# Patient Record
Sex: MALE | Race: WHITE | NOT HISPANIC OR LATINO | Employment: OTHER | URBAN - METROPOLITAN AREA
[De-identification: names, ages, dates, MRNs, and addresses within clinical notes are randomized per-mention and may not be internally consistent; named-entity substitution may affect disease eponyms.]

---

## 2019-03-22 ASSESSMENT — MIFFLIN-ST. JEOR: SCORE: 1529.43

## 2019-03-26 ENCOUNTER — ANESTHESIA - HEALTHEAST (OUTPATIENT)
Dept: SURGERY | Facility: HOSPITAL | Age: 69
End: 2019-03-26

## 2019-03-27 ENCOUNTER — SURGERY - HEALTHEAST (OUTPATIENT)
Dept: SURGERY | Facility: HOSPITAL | Age: 69
End: 2019-03-27

## 2019-04-01 ENCOUNTER — AMBULATORY - HEALTHEAST (OUTPATIENT)
Dept: OTHER | Facility: CLINIC | Age: 69
End: 2019-04-01

## 2019-08-05 ENCOUNTER — TRANSFERRED RECORDS (OUTPATIENT)
Dept: HEALTH INFORMATION MANAGEMENT | Facility: CLINIC | Age: 69
End: 2019-08-05

## 2019-08-23 ENCOUNTER — RECORDS - HEALTHEAST (OUTPATIENT)
Dept: ADMINISTRATIVE | Facility: OTHER | Age: 69
End: 2019-08-23

## 2019-09-26 ASSESSMENT — MIFFLIN-ST. JEOR: SCORE: 1642.82

## 2019-10-20 ENCOUNTER — ANESTHESIA - HEALTHEAST (OUTPATIENT)
Dept: SURGERY | Facility: CLINIC | Age: 69
End: 2019-10-20

## 2019-10-21 ENCOUNTER — SURGERY - HEALTHEAST (OUTPATIENT)
Dept: SURGERY | Facility: CLINIC | Age: 69
End: 2019-10-21

## 2019-10-21 ENCOUNTER — AMBULATORY - HEALTHEAST (OUTPATIENT)
Dept: OTHER | Facility: CLINIC | Age: 69
End: 2019-10-21

## 2019-10-21 ASSESSMENT — MIFFLIN-ST. JEOR
SCORE: 1626.95
SCORE: 1626.95

## 2019-10-23 ENCOUNTER — COMMUNICATION - HEALTHEAST (OUTPATIENT)
Dept: SCHEDULING | Facility: CLINIC | Age: 69
End: 2019-10-23

## 2020-03-11 ASSESSMENT — MIFFLIN-ST. JEOR: SCORE: 1678.31

## 2020-03-15 ENCOUNTER — ANESTHESIA - HEALTHEAST (OUTPATIENT)
Dept: SURGERY | Facility: CLINIC | Age: 70
End: 2020-03-15

## 2020-03-16 ENCOUNTER — SURGERY - HEALTHEAST (OUTPATIENT)
Dept: SURGERY | Facility: CLINIC | Age: 70
End: 2020-03-16

## 2020-03-16 ASSESSMENT — MIFFLIN-ST. JEOR: SCORE: 1653.49

## 2021-05-01 ENCOUNTER — HEALTH MAINTENANCE LETTER (OUTPATIENT)
Age: 71
End: 2021-05-01

## 2021-05-27 NOTE — ANESTHESIA POSTPROCEDURE EVALUATION
Patient: Iraj De Leon  CYSTOSCOPY RIGHT RETROGRADE PYELOGRAM, RIGHT URETEROSCOPY WITH LASER  LITHOTRIPSY STONE EXTRACTION AND RIGHT STENT EXCHANGE  Anesthesia type: general    Patient location: Phase II Recovery  Last vitals:   Vitals:    03/27/19 1800   BP: 142/80   Pulse: 89   Resp: 16   Temp: 37.1  C (98.7  F)   SpO2: 93%     Post vital signs: stable  Level of consciousness: awake and responds to simple questions  Post-anesthesia pain: pain controlled  Post-anesthesia nausea and vomiting: no  Pulmonary: unassisted, return to baseline  Cardiovascular: stable and blood pressure at baseline  Hydration: adequate  Anesthetic events: no    QCDR Measures:  ASA# 11 - Leanne-op Cardiac Arrest: ASA11B - Patient did NOT experience unanticipated cardiac arrest  ASA# 12 - Leanne-op Mortality Rate: ASA12B - Patient did NOT die  ASA# 13 - PACU Re-Intubation Rate: ASA13B - Patient did NOT require a new airway mgmt  ASA# 10 - Composite Anes Safety: ASA10A - No serious adverse event    Additional Notes:

## 2021-05-27 NOTE — ANESTHESIA PREPROCEDURE EVALUATION
Anesthesia Evaluation      Patient summary reviewed   No history of anesthetic complications     Airway   Mallampati: I  Neck ROM: full   Pulmonary - negative ROS and normal exam                          Cardiovascular - normal exam  Exercise tolerance: > or = 4 METS  (+) hypertension, , hypercholesterolemia,      Neuro/Psych    (+) neuromuscular disease,  anxiety/panic attacks,     Endo/Other    (+) diabetes mellitus type 2 well controlled,      GI/Hepatic/Renal - negative ROS      Other findings: Multiple sclerosis since age 18, fairly stable. Sleep disturbance felt secondary to snoring.  Had ESWL about 3 weeks ago at [facility] but they were unable to break up the stone enough.  Diet controlled DM. 2/28/19:  Hg 16.2, K 4.3, GFR>60, A1c 6.4.      Dental - normal exam                        Anesthesia Plan  Planned anesthetic: general endotracheal    ASA 2   Induction: intravenous   Anesthetic plan and risks discussed with: patient    Post-op plan: routine recovery

## 2021-06-02 VITALS — BODY MASS INDEX: 29.35 KG/M2 | WEIGHT: 205 LBS | HEIGHT: 70 IN

## 2021-06-02 NOTE — ANESTHESIA PROCEDURE NOTES
Spinal Block    Patient location during procedure: OR  Start time: 10/21/2019 10:10 AM  End time: 10/21/2019 10:19 AM  Reason for block: primary anesthetic    Staffing:  Performing  Anesthesiologist: Aparna Ramires MD    Preanesthetic Checklist  Completed: patient identified, risks, benefits, and alternatives discussed, timeout performed, consent obtained, airway assessed, oxygen available, suction available, emergency drugs available and hand hygiene performed  Spinal Block  Patient position: sitting  Prep: ChloraPrep  Patient monitoring: heart rate, cardiac monitor, continuous pulse ox and blood pressure  Approach: midline  Location: L3-4  Injection technique: single-shot  Needle type: pencil-tip   Needle gauge: 24 G    Assessment  Sensory level: T6

## 2021-06-02 NOTE — ANESTHESIA POSTPROCEDURE EVALUATION
Patient: Iraj De Leon  RIGHT TOTAL HIP ARTHROPLASTY  Anesthesia type: spinal    Patient location: PACU  Last vitals:   Vitals Value Taken Time   /60 10/21/2019  1:11 PM   Temp 36.4  C (97.5  F) 10/21/2019  1:11 PM   Pulse 93 10/21/2019  1:11 PM   Resp 16 10/21/2019  1:11 PM   SpO2 92 % 10/21/2019  1:11 PM     Post vital signs: stable  Level of consciousness: awake and responds to simple questions  Post-anesthesia pain: pain controlled  Post-anesthesia nausea and vomiting: no  Pulmonary: unassisted, return to baseline  Cardiovascular: stable and blood pressure at baseline  Hydration: adequate  Anesthetic events: no    QCDR Measures:  ASA# 11 - Leanne-op Cardiac Arrest: ASA11B - Patient did NOT experience unanticipated cardiac arrest  ASA# 12 - Leanne-op Mortality Rate: ASA12B - Patient did NOT die  ASA# 13 - PACU Re-Intubation Rate: NA - No ETT / LMA used for case  ASA# 10 - Composite Anes Safety: ASA10A - No serious adverse event    Additional Notes:

## 2021-06-02 NOTE — ANESTHESIA CARE TRANSFER NOTE
Last vitals:   Vitals:    10/21/19 1200   BP: 105/58   Pulse: 94   Resp: 14   Temp: 36.7  C (98.1  F)   SpO2: 100%     Patient's level of consciousness is awake  Spontaneous respirations: yes  Maintains airway independently: yes  Dentition unchanged: yes  Oropharynx: oropharynx clear of all foreign objects    QCDR Measures:  ASA# 20 - Surgical Safety Checklist: WHO surgical safety checklist completed prior to induction    PQRS# 430 - Adult PONV Prevention: 4558F - Pt received => 2 anti-emetic agents (different classes) preop & intraop  ASA# 8 - Peds PONV Prevention: NA - Not pediatric patient, not GA or 2 or more risk factors NOT present  PQRS# 424 - Leanne-op Temp Management: 4559F - At least one body temp DOCUMENTED => 35.5C or 95.9F within required timeframe  PQRS# 426 - PACU Transfer Protocol: - Transfer of care checklist used  ASA# 14 - Acute Post-op Pain: ASA14B - Patient did NOT experience pain >= 7 out of 10

## 2021-06-02 NOTE — TELEPHONE ENCOUNTER
Wife calling.  Discharged from hip replacement surgery yesterday.  Running fever 101.1.  Was given tylenol.    Dr. Gomez did surgery at [facility].  She will call their main number to see if she can page the on call provider, or they have a urgent care that opens at 8am.    I do think he needs to be seen today.  He should not be running a fever within 24 hours after surgery.    Camila Pang, RN, [facility] Nurse Triage/Med Refills RN     Reason for Disposition    Fever > 100.5 F (38.1 C)    Protocols used: POST-OP SYMPTOMS AND ANKKOESKK-A-UK

## 2021-06-02 NOTE — ANESTHESIA PREPROCEDURE EVALUATION
Anesthesia Evaluation      Patient summary reviewed     Airway   Mallampati: II  Neck ROM: full   Pulmonary - negative ROS and normal exam    breath sounds clear to auscultation                         Cardiovascular   (+) hypertension, ,     Rhythm: regular  Rate: normal,         Neuro/Psych - negative ROS   (+) anxiety/panic attacks,     Endo/Other    (+) diabetes mellitus type 2 well controlled, arthritis,      GI/Hepatic/Renal    (+)   chronic renal disease,           Dental - normal exam                        Anesthesia Plan  Planned anesthetic: spinal    ASA 3   Induction: intravenous   Anesthetic plan and risks discussed with: patient and spouse    Post-op plan: routine recovery

## 2021-06-03 VITALS — HEIGHT: 70 IN | WEIGHT: 191.5 LBS | BODY MASS INDEX: 27.41 KG/M2

## 2021-06-04 VITALS — HEIGHT: 70 IN | BODY MASS INDEX: 28 KG/M2 | WEIGHT: 195.6 LBS

## 2021-06-06 NOTE — ANESTHESIA POSTPROCEDURE EVALUATION
Patient: Iraj De Leon  Procedure(s):  LEFT TOTAL HIP ARTHROPLASTY (Left)  Anesthesia type: general    Patient location: PACU  Last vitals:   Vitals Value Taken Time   /60 3/16/2020 10:30 AM   Temp 37.3  C (99.2  F) 3/16/2020 10:31 AM   Pulse 92 3/16/2020 10:39 AM   Resp 24 3/16/2020 10:39 AM   SpO2 97 % 3/16/2020 10:39 AM   Vitals shown include unvalidated device data.  Post vital signs: stable  Level of consciousness: awake and return to baseline  Post-anesthesia pain: pain controlled  Post-anesthesia nausea and vomiting: no  Pulmonary: unassisted, nasal cannula  Cardiovascular: stable and blood pressure at baseline  Hydration: adequate  Anesthetic events: no    QCDR Measures:  ASA# 11 - Leanne-op Cardiac Arrest: ASA11B - Patient did NOT experience unanticipated cardiac arrest  ASA# 12 - Leanne-op Mortality Rate: ASA12B - Patient did NOT die  ASA# 13 - PACU Re-Intubation Rate: ASA13B - Patient did NOT require a new airway mgmt  ASA# 10 - Composite Anes Safety: ASA10A - No serious adverse event       Additional Notes:

## 2021-06-06 NOTE — ANESTHESIA PREPROCEDURE EVALUATION
Anesthesia Evaluation      Patient summary reviewed   No history of anesthetic complications     Airway   Mallampati: I  Neck ROM: full   Pulmonary - negative ROS and normal exam    breath sounds clear to auscultation                         Cardiovascular - normal exam  (+) hypertension, ,     ECG reviewed  Rhythm: regular  Rate: normal,         Neuro/Psych - negative ROS     Endo/Other    (+) diabetes mellitus type 2 well controlled, arthritis,      GI/Hepatic/Renal    (-) renal disease          Dental - normal exam                        Anesthesia Plan  Planned anesthetic: general endotracheal    ASA 2   Induction: intravenous   Anesthetic plan and risks discussed with: patient  Anesthesia plan special considerations: antiemetics,   Post-op plan: routine recovery

## 2021-06-06 NOTE — ANESTHESIA CARE TRANSFER NOTE
Last vitals:   Vitals:    03/16/20 0954   BP: 147/73   Pulse: 77   Resp: 16   Temp: 37.1  C (98.7  F)   SpO2: 100%     Patient's level of consciousness is awake  Spontaneous respirations: yes  Maintains airway independently: yes  Dentition unchanged: yes  Oropharynx: oropharynx clear of all foreign objects    QCDR Measures:  ASA# 20 - Surgical Safety Checklist: WHO surgical safety checklist completed prior to induction    PQRS# 430 - Adult PONV Prevention: 4558F - Pt received => 2 anti-emetic agents (different classes) preop & intraop  ASA# 8 - Peds PONV Prevention: NA - Not pediatric patient, not GA or 2 or more risk factors NOT present  PQRS# 424 - Leanne-op Temp Management: 4559F - At least one body temp DOCUMENTED => 35.5C or 95.9F within required timeframe  PQRS# 426 - PACU Transfer Protocol: - Transfer of care checklist used  ASA# 14 - Acute Post-op Pain: ASA14B - Patient did NOT experience pain >= 7 out of 10

## 2021-06-16 PROBLEM — M54.50 ACUTE RIGHT-SIDED LOW BACK PAIN WITHOUT SCIATICA: Status: ACTIVE | Noted: 2019-01-08

## 2021-06-16 PROBLEM — G93.89 ENCEPHALOMALACIA ON IMAGING STUDY: Status: ACTIVE | Noted: 2019-06-04

## 2021-06-16 PROBLEM — T50.2X5A DIURETIC-INDUCED HYPOKALEMIA: Status: ACTIVE | Noted: 2019-09-24

## 2021-06-16 PROBLEM — B35.1 ONYCHOMYCOSIS: Status: ACTIVE | Noted: 2017-05-11

## 2021-06-16 PROBLEM — E87.6 DIURETIC-INDUCED HYPOKALEMIA: Status: ACTIVE | Noted: 2019-09-24

## 2021-06-16 PROBLEM — M25.512 CHRONIC LEFT SHOULDER PAIN: Status: ACTIVE | Noted: 2019-01-08

## 2021-06-16 PROBLEM — M16.9 DEGENERATIVE JOINT DISEASE (DJD) OF HIP: Status: ACTIVE | Noted: 2019-10-21

## 2021-06-16 PROBLEM — E78.2 MIXED HYPERLIPIDEMIA: Status: ACTIVE | Noted: 2019-11-18

## 2021-06-16 PROBLEM — G89.29 CHRONIC LEFT SHOULDER PAIN: Status: ACTIVE | Noted: 2019-01-08

## 2021-06-16 PROBLEM — L82.1 OTHER SEBORRHEIC KERATOSIS: Status: ACTIVE | Noted: 2019-11-18

## 2021-06-26 ENCOUNTER — HEALTH MAINTENANCE LETTER (OUTPATIENT)
Age: 71
End: 2021-06-26

## 2021-10-16 ENCOUNTER — HEALTH MAINTENANCE LETTER (OUTPATIENT)
Age: 71
End: 2021-10-16

## 2021-12-13 ENCOUNTER — HOSPITAL ENCOUNTER (EMERGENCY)
Facility: CLINIC | Age: 71
Discharge: HOME | End: 2021-12-13
Admitting: EMERGENCY MEDICINE
Payer: MEDICARE

## 2021-12-13 VITALS
BODY MASS INDEX: 27.35 KG/M2 | OXYGEN SATURATION: 95 % | TEMPERATURE: 98.8 F | RESPIRATION RATE: 18 BRPM | WEIGHT: 191 LBS | HEART RATE: 67 BPM | DIASTOLIC BLOOD PRESSURE: 65 MMHG | HEIGHT: 70 IN | SYSTOLIC BLOOD PRESSURE: 110 MMHG

## 2021-12-13 DIAGNOSIS — N20.1 URETEROLITHIASIS: ICD-10-CM

## 2021-12-13 LAB
ANION GAP SERPL CALCULATED.3IONS-SCNC: 11 MMOL/L (ref 5–18)
BUN SERPL-MCNC: 23 MG/DL (ref 8–28)
C REACTIVE PROTEIN LHE: 5.2 MG/DL (ref 0–0.8)
CALCIUM SERPL-MCNC: 9.8 MG/DL (ref 8.5–10.5)
CHLORIDE BLD-SCNC: 102 MMOL/L (ref 98–107)
CO2 SERPL-SCNC: 30 MMOL/L (ref 22–31)
CREAT SERPL-MCNC: 1.3 MG/DL (ref 0.7–1.3)
ERYTHROCYTE [DISTWIDTH] IN BLOOD BY AUTOMATED COUNT: 12.8 % (ref 10–15)
GFR SERPL CREATININE-BSD FRML MDRD: 55 ML/MIN/1.73M2
GLUCOSE BLD-MCNC: 128 MG/DL (ref 70–125)
HCT VFR BLD AUTO: 49.4 % (ref 40–53)
HGB BLD-MCNC: 16.4 G/DL (ref 13.3–17.7)
MCH RBC QN AUTO: 29.5 PG (ref 26.5–33)
MCHC RBC AUTO-ENTMCNC: 33.2 G/DL (ref 31.5–36.5)
MCV RBC AUTO: 89 FL (ref 78–100)
PLATELET # BLD AUTO: 249 10E3/UL (ref 150–450)
POTASSIUM BLD-SCNC: 3.6 MMOL/L (ref 3.5–5)
RBC # BLD AUTO: 5.55 10E6/UL (ref 4.4–5.9)
SARS-COV-2 RNA RESP QL NAA+PROBE: NEGATIVE
SODIUM SERPL-SCNC: 143 MMOL/L (ref 136–145)
WBC # BLD AUTO: 9.9 10E3/UL (ref 4–11)

## 2021-12-13 PROCEDURE — 36415 COLL VENOUS BLD VENIPUNCTURE: CPT | Performed by: PHYSICIAN ASSISTANT

## 2021-12-13 PROCEDURE — 87635 SARS-COV-2 COVID-19 AMP PRB: CPT | Performed by: PHYSICIAN ASSISTANT

## 2021-12-13 PROCEDURE — 85027 COMPLETE CBC AUTOMATED: CPT | Performed by: PHYSICIAN ASSISTANT

## 2021-12-13 PROCEDURE — 96374 THER/PROPH/DIAG INJ IV PUSH: CPT

## 2021-12-13 PROCEDURE — 250N000011 HC RX IP 250 OP 636: Performed by: PHYSICIAN ASSISTANT

## 2021-12-13 PROCEDURE — 86141 C-REACTIVE PROTEIN HS: CPT | Performed by: PHYSICIAN ASSISTANT

## 2021-12-13 PROCEDURE — 99285 EMERGENCY DEPT VISIT HI MDM: CPT | Mod: 25

## 2021-12-13 PROCEDURE — C9803 HOPD COVID-19 SPEC COLLECT: HCPCS

## 2021-12-13 PROCEDURE — 96375 TX/PRO/DX INJ NEW DRUG ADDON: CPT

## 2021-12-13 PROCEDURE — 80048 BASIC METABOLIC PNL TOTAL CA: CPT | Performed by: PHYSICIAN ASSISTANT

## 2021-12-13 RX ORDER — ONDANSETRON 4 MG/1
4 TABLET, ORALLY DISINTEGRATING ORAL EVERY 8 HOURS PRN
Qty: 12 TABLET | Refills: 0 | Status: SHIPPED | OUTPATIENT
Start: 2021-12-13 | End: 2024-02-04

## 2021-12-13 RX ORDER — OXYCODONE HYDROCHLORIDE 5 MG/1
5 TABLET ORAL EVERY 6 HOURS PRN
Qty: 8 TABLET | Refills: 0 | Status: SHIPPED | OUTPATIENT
Start: 2021-12-13 | End: 2023-11-27

## 2021-12-13 RX ORDER — HYDROMORPHONE HYDROCHLORIDE 1 MG/ML
0.5 INJECTION, SOLUTION INTRAMUSCULAR; INTRAVENOUS; SUBCUTANEOUS ONCE
Status: COMPLETED | OUTPATIENT
Start: 2021-12-13 | End: 2021-12-13

## 2021-12-13 RX ORDER — ACETAMINOPHEN 325 MG/1
975 TABLET ORAL ONCE
Status: DISCONTINUED | OUTPATIENT
Start: 2021-12-13 | End: 2021-12-13

## 2021-12-13 RX ORDER — OXYCODONE HYDROCHLORIDE 5 MG/1
5 TABLET ORAL ONCE
Status: DISCONTINUED | OUTPATIENT
Start: 2021-12-13 | End: 2021-12-13

## 2021-12-13 RX ORDER — KETOROLAC TROMETHAMINE 30 MG/ML
15 INJECTION, SOLUTION INTRAMUSCULAR; INTRAVENOUS ONCE
Status: COMPLETED | OUTPATIENT
Start: 2021-12-13 | End: 2021-12-13

## 2021-12-13 RX ORDER — ONDANSETRON 2 MG/ML
4 INJECTION INTRAMUSCULAR; INTRAVENOUS ONCE
Status: COMPLETED | OUTPATIENT
Start: 2021-12-13 | End: 2021-12-13

## 2021-12-13 RX ADMIN — KETOROLAC TROMETHAMINE 15 MG: 30 INJECTION, SOLUTION INTRAMUSCULAR at 13:28

## 2021-12-13 RX ADMIN — HYDROMORPHONE HYDROCHLORIDE 0.5 MG: 1 INJECTION, SOLUTION INTRAMUSCULAR; INTRAVENOUS; SUBCUTANEOUS at 13:31

## 2021-12-13 RX ADMIN — ONDANSETRON 4 MG: 2 INJECTION INTRAMUSCULAR; INTRAVENOUS at 13:30

## 2021-12-13 ASSESSMENT — ENCOUNTER SYMPTOMS
VOMITING: 0
FLANK PAIN: 1
FEVER: 0
ABDOMINAL PAIN: 1
DYSURIA: 0
CHILLS: 0
FREQUENCY: 0
DIFFICULTY URINATING: 0
HEMATURIA: 0
SHORTNESS OF BREATH: 0
NAUSEA: 0

## 2021-12-13 ASSESSMENT — MIFFLIN-ST. JEOR: SCORE: 1627.62

## 2021-12-13 NOTE — CONSULTS
[facility] CONSULT      Type of Consult: emergency room   Place of Service:  [facility]   Reason for Consultation: flank pain / 8x7 mm obstructing stone with associated hydronephrosis  Consult Requested by: Kandi Nance PA-C    History of present illness:  Iraj De Leon is a 71 year old male history of HTN, elevated cholesterol and kidney stones that was referred to the ED from urgent care Urology was consulted for a 8x7  mm  obstructing stone with associated hydronephrosis. History obtained through patient,  and chart review.     Patient locates pain to right flank. Patient states radiation of the pain from upper right flank to lower. Patient reports the pain is currently 3/10 after pain medications, at worst 8/10. Patient states that the symptoms have been going on for 4 days. Patient states initially he thought it was musculoskeletal however pain became colicky in nature and sever over the past 2 days consistent with kidney stones he has had in the past . Patient denies: dysuria, fevers, body ache or vomiting. He has has previous stones in the past stating most recent being march 2019 requiring lithotripsy and stent placement by Dr. Pires through [facility].  He does state a history of calcium stones.     Given patein he did seek care at urgent care. Patients urine analysis through Urgent care is noninfectious, no nitrite, leucocyte, bacteria or abnormal rbc.CT obtained at urgent care to an 8 x 7 mm stone in the proximal right ureter just below the right uteropelvic junction with moderate obstruction above the level as well as a 5 mm stone in the lower pole of the left kidney.  Given size of patient's stone and discomfort urgent care did refer patient to [facility] emergency department.      While in [facility] emergency department, ED laboratory evaluation was with mild elevation of CRP 5.2, creatinine 1.3 (baseline 0.7-1.2), wbc 9.9.  Patient does states marked improvement of pain with the medications  provided in the ED consisted of IV Dilaudid and Toradol.  He is hopeful for definitive stone management.      Past medical history:  Past Medical History:   Diagnosis Date     Arthritis      Cancer (H) 02/2020    basal cell Moh's forehead     Diabetes mellitus (H)     type 2, diet controlled as of March 2019     Headache 2014    Due to spontaneous spinal fluid leak     Hyperlipidemia      Hypertension      Kidney stones 2019     Multiple sclerosis (H)      Osteoarthritis     bilateral hips     Pituitary microadenoma (H)      Posterior vitreous detachment        Past surgical history:  Past Surgical History:   Procedure Laterality Date     BASAL CELL CARCINOMA EXCISION  02/20/2020    Moh's procedure to forehead     C TOTAL HIP ARTHROPLASTY Right 10/21/2019    Procedure: RIGHT TOTAL HIP ARTHROPLASTY;  Surgeon: Conrado Gomez MD;  Location: [facility];  Service: Orthopedics     C TOTAL HIP ARTHROPLASTY Left 3/16/2020    Procedure: LEFT TOTAL HIP ARTHROPLASTY;  Surgeon: Conrado Gomez MD;  Location: [facility];  Service: Orthopedics     CHOLECYSTECTOMY       COLONOSCOPY       EYE SURGERY Right     cataract     JOINT REPLACEMENT  10/2019    RTHA     LITHOTRIPSY  2019     OTHER SURGICAL HISTORY  2009    lipoma removalforehead     OTHER SURGICAL HISTORY      spinal fluid leak     ND CYSTO/URETERO W/LITHOTRIPSY &INDWELL STENT INSRT Right 3/27/2019    Procedure: CYSTOSCOPY RIGHT RETROGRADE PYELOGRAM, RIGHT URETEROSCOPY WITH LASER  LITHOTRIPSY STONE EXTRACTION AND RIGHT STENT EXCHANGE;  Surgeon: Zia Coyle MD;  Location: [facility];  Service: Urology       Social history:  Social History     Socioeconomic History     Marital status:      Spouse name: Not on file     Number of children: Not on file     Years of education: Not on file     Highest education level: Not on file   Occupational History     Not on file   Tobacco Use     Smoking status: Former Smoker     Packs/day: 0.00     " Years: 30.00     Pack years: 0.00     Quit date: 2012     Years since quittin.9     Smokeless tobacco: Never Used     Tobacco comment: cigars, very occasional, maybe 1 x week   Substance and Sexual Activity     Alcohol use: Yes     Comment: Alcoholic Drinks/day: 2 drinks per month     Drug use: No     Sexual activity: Not on file   Other Topics Concern     Not on file   Social History Narrative     Not on file     Social Determinants of Health     Financial Resource Strain: Not on file   Food Insecurity: Not on file   Transportation Needs: Not on file   Physical Activity: Not on file   Stress: Not on file   Social Connections: Not on file   Intimate Partner Violence: Not on file   Housing Stability: Not on file       Medications:  No current facility-administered medications for this encounter.     Current Outpatient Medications   Medication     ondansetron (ZOFRAN-ODT) 4 MG ODT tab     oxyCODONE (ROXICODONE) 5 MG tablet     acetaminophen (TYLENOL) 500 MG tablet     aspirin 325 MG tablet     chlorthalidone (HYGROTEN) 25 MG tablet     cholecalciferol, vitamin D3, 1,000 unit (25 mcg) tablet     lisinopril (PRINIVIL,ZESTRIL) 20 MG tablet     multivitamin therapeutic tablet     oxyCODONE (ROXICODONE) 5 MG immediate release tablet     potassium chloride (K-DUR,KLOR-CON) 20 MEQ tablet     rosuvastatin (CRESTOR) 10 MG tablet     traMADoL (ULTRAM) 50 mg tablet       Allergies:  No Known Allergies    Review of systems:  A full 12 point review of systems was taken and is negative aside from what is noted above in the HPI.    PHYSICAL EXAM  /65   Pulse 67   Temp 98.8  F (37.1  C) (Oral)   Resp 18   Ht 1.778 m (5' 10\")   Wt 86.6 kg (191 lb)   SpO2 95%   BMI 27.41 kg/m     General: NAD, alert, cooperative  Head: normocephalic, without abnormality / atraumatic  Abdomen: soft, non tender, non distended. nosuprapubic fullness/tenderness.  Mild right CVA tenderness noted  Skin: no rashes or " lesions  Musculoskeletal: moves all four extremities equally; no calf edema or tenderness  Psychological: alert and oriented, answers questions appropriately    Labs:     Lab Results   Component Value Date     12/13/2021     11/18/2019    CO2 30 12/13/2021    CO2 26 11/18/2019    BUN 23 12/13/2021    BUN 17 11/18/2019     Lab Results   Component Value Date    WBC 9.9 12/13/2021    WBC 9.5 11/18/2019    HGB 16.4 12/13/2021    HGB 12.9 03/17/2020    HGB 15.7 11/18/2019    HCT 49.4 12/13/2021    HCT 46.4 11/18/2019    MCV 89 12/13/2021    MCV 88 11/18/2019     12/13/2021     11/18/2019     10/21/2019   Specimen:  Urine - Urine specimen (specimen)   Ref Range & Units Today   COLOR                     Yellow Color Yellow    CLARITY                   Clear Clarity Clear    SPECIFIC GRAVITY,URINE    1.010, 1.015, 1.020, 1.025                 >=1.030 Abnormal     PH,URINE                  6.0, 7.0, 8.0, 5.5, 6.5, 7.5, 8.5                 5.5    UROBILINOGEN,QUALITATIVE  Normal EU/dl Normal    PROTEIN, URINE Negative mg/dL Negative    GLUCOSE, URINE Negative mg/dL 250 Abnormal     KETONES,URINE             Negative mg/dL Negative    BILIRUBIN,URINE           Negative                 Negative    OCCULT BLOOD,URINE        Negative                 Negative    NITRITE                   Negative                 Negative    LEUKOCYTE ESTERASE        Negative                 Negative    Resulting Agency  [facility]   Specimen Collected: 12/13/21 10:37 AM Last Resulted: 12/13/21 10:53 AM   [facility]  Result Received: 12/13/21 11:34 AM         Imaging:  CT abdomen pelvis stone protocol through [employer] system on 12/13/2021 at 10: 53 AM  IMPRESSION:   1.  There is an 8 x 7 mm stone in the proximal right ureter. Moderate obstruction above this level.     2.  Nonobstructing stone lower pole left kidney     I have personally reviewed the  imaging reports above.     Assessment/plan: Iraj De Leon is being seen by [facility] for flank pain, 8x7 mm obstructing stone with hydronephrosis, patient does have other stones noted      - Patient is afebrile and is hemodynamically stable, patient's white blood cell count is within the expected range and UA is not suggestive of an infection,   - Given above did discuss with patient options including medical expulsive therapy, ureteral stenting, and ureteroscopy with holmium laser lithotripsy as available options for management of obstructing stone, and the risks and benefits associated with each  - Patient with a right 8 x 7 mm proximal stone, unlikely he will spontaneously pass this stone.  He is also with slight bump in creatinine, where 1.3 is normal this is higher for the patient his typical ranges 0.7-1.2. Patient would prefer surgical management given pain, and I would also favor this given the size of stone and slight elevation in his creatinine.  Patient will proceed with definitive management of his right proximal ureteral stone, he is scheduled for cystoscopy, ureteroscopy, retrograde pyelogram, possible lithotripsy and right ureteral stent placement tomorrow at 2:30 PM by Dr. Powers at [facility].  He will be n.p.o. 8 hours prior to surgery.  Patient will arrive to hospital 12 PM tomorrow for this procedure  - Old records reviewed - careverywhere, unfortunately due to technical difficulties unable to review patient's urologic chart as the emerge system is down.    The patient and I dicussed treatment options and their alternatives, including the risks and benefits of each. We discussed the importance of treatment and that left untreated stones can lead to potential renal function deterioration after 4 to 6 weeks and increased risk of infection and the complications associated with urinary infections. Patient demonstrated understanding. All questions and concerns were answered in detail.        Thank you for consulting [facility] regarding this patient's care. Please contact us with questions or concerns.     Tabitha Weber PA-C  [facility]   [phone]

## 2021-12-13 NOTE — ED TRIAGE NOTES
Patient presents to the ED with complaints of low back and right flank pain that started on Friday. HE reports he was just seen at Urgent care and sent to the ED with a kidney stone that is 8 mm x 7 mm on his right side.

## 2021-12-13 NOTE — DISCHARGE INSTRUCTIONS
Your CT scan shows right sided ureteral stone that is causing your symptoms. Given you are feeling better we are discharging you home, but you need to follow up with [facility] as instructed. Go to [facility] tomorrow at noon tomorrow. Nothing to eat or drink after midnight other than sips of water to take medications in the morning.     Use medications as recommended:  Ibuprofen: 400-600 mg every 8 hours until the stone passes  Tylenol: 650 mg every 8 hours until the stone passes  Dramamine: Take 50 mg at bedtime every night until stone passes, in addtion, take every 6 hours as needed for nausea or pain. Found over-the-counter.  Oxycodone: 5 mg every 6 hours as needed for severe pain not relieved by ibuprofen, tylenol and dramamine.  Zofran: 4 mg every 8 hours as needed for severe nausea.    Strain your urine. If you collect the stone it can be analyzed.  If you have fevers, worsening pain, persistent vomiting, or other worsening symptoms, return to the ER.

## 2021-12-13 NOTE — ED PROVIDER NOTES
EMERGENCY DEPARTMENT ENCOUNTER      NAME: Iraj De Leon  AGE: 71 year old male  YOB: 1950  MRN: [id number]  EVALUATION DATE & TIME: No admission date for patient encounter.    PCP: Chang Rojas    ED PROVIDER: Kandi Nance PA-C      Chief Complaint   Patient presents with     Flank Pain         FINAL IMPRESSION:  1. Ureterolithiasis          MEDICAL DECISION MAKING:    Pertinent Labs & Imaging studies reviewed. (See chart for details)  71 year old male with a pertinent history of type 2 diabetes mellitus, HTN, hyperlipidemia, calcium oxalate monohydrate kidney stones, MS, and history of tobacco use presents to the Emergency Department for evaluation of right flank pain radiating to groin x 3 days.     Vitals reviewed and notable for elevated blood pressure on arrival, likely secondary to pain as this improved with pain management. Patient is afebrile and nontoxic appearing. On physical exam, no reproducible abdominal tenderness. Mild right CVA tenderness. No overlying skin changes. Differential diagnosis includes but not limited to intestinal ischemia, colitis/diverticulitis, appendicitis, pyelonephritis, ureterolithiasis/renal colic, bowel obstruction.    Had CT this morning at [facility] reading 8 x 7 mm stone in the proximal right ureter. Moderate obstruction above this level. UA without evidence of infection. No leukocytosis. CRP 5.2. Cr 1.30, baseline 1.0. Spoke with Bridgett Weber with [facility]. She came to department to meet with patient. Plan to have outpatient procedure with stent placement tomorrow at [facility] at 2:30 PM. Patient is afebrile, without any signs of kidney failure, or urinary tract infection.  Feeling much better after medications here. Is stable for discharge home. NPO at midnight. Discussed signs and symptoms that should prompt return to the ER and patient is comfortable with this plan.     0 minutes of critical care time     ED COURSE:  12:21 PM I met with the patient, obtained  history, performed an initial exam, and discussed options and plan for diagnostics and treatment here in the ED.  1:12 PM Spoke with Bridgett Weber PA-C with [facility]. Plan for labs, pain management and she will be here to evaluate patient.  2:52 PM Bridgett Weber PA-C here to see patient. Plan for discharge and will have procedure tomorrow at 2:30 pm at [facility].   3:20 PM Patient discharged after being provided with extensive anticipatory guidance and given return precautions, importance of PCP follow-up emphasized.    At the conclusion of the encounter I discussed the results of all of the tests and the disposition. The questions were answered. The patient and family acknowledged understanding and were agreeable with the care plan.     MEDICATIONS GIVEN IN THE EMERGENCY:  Medications   ondansetron (ZOFRAN) injection 4 mg (4 mg Intravenous Given 12/13/21 1330)   ketorolac (TORADOL) injection 15 mg (15 mg Intravenous Given 12/13/21 1328)   HYDROmorphone (PF) (DILAUDID) injection 0.5 mg (0.5 mg Intravenous Given 12/13/21 1331)       NEW PRESCRIPTIONS STARTED AT TODAY'S ER VISIT  New Prescriptions    ONDANSETRON (ZOFRAN-ODT) 4 MG ODT TAB    Take 1 tablet (4 mg) by mouth every 8 hours as needed for nausea    OXYCODONE (ROXICODONE) 5 MG TABLET    Take 1 tablet (5 mg) by mouth every 6 hours as needed for severe pain If pain is not improved with acetaminophen and ibuprofen.       =================================================================    HPI:    Patient information was obtained from: Patient    Use of Interpretor: N/A         Iraj De Leon is a 71 year old male with a pertinent history of type 2 diabetes mellitus, HTN, hyperlipidemia, calcium oxalate monohydrate kidney stones, MS, and history of tobacco use who presents to this ED via walk in for evaluation of flank pain.     Patient reports right flank pain onset 12/11 (3 days ago). Pain constant, keeping him up at night. Last night pain began radiating  into his right groin. He denies any nausea, vomiting, fevers, chills, dysuria, hematuria. Reports long standing history of kidney stones. Sees Dr. Coyle with [facility]. Patient was seen at the [facility] prior to arrival today. CT abdomen pelvis found a 8 x 7 mm stone in the proximal right ureter just below the right ureteropelvic junction. Moderate obstruction above this level. There is also a 5 mm stone lower pole left kidney. Patient was referred here for further evaluation. He has not taken any pain medication today.    REVIEW OF SYSTEMS:  Review of Systems   Constitutional: Negative for chills and fever.   Respiratory: Negative for shortness of breath.    Cardiovascular: Negative for chest pain.   Gastrointestinal: Positive for abdominal pain (RLQ). Negative for nausea and vomiting.   Genitourinary: Positive for flank pain (right sided). Negative for difficulty urinating, dysuria, frequency and hematuria.   Skin: Negative for rash.   All other systems reviewed and are negative.      PAST MEDICAL HISTORY:  Past Medical History:   Diagnosis Date     Arthritis      Cancer (H) 02/2020    basal cell Moh's forehead     Diabetes mellitus (H)     type 2, diet controlled as of March 2019     Headache 2014    Due to spontaneous spinal fluid leak     Hyperlipidemia      Hypertension      Kidney stones 2019     Multiple sclerosis (H)      Osteoarthritis     bilateral hips     Pituitary microadenoma (H)      Posterior vitreous detachment        PAST SURGICAL HISTORY:  Past Surgical History:   Procedure Laterality Date     BASAL CELL CARCINOMA EXCISION  02/20/2020    Moh's procedure to forehead     C TOTAL HIP ARTHROPLASTY Right 10/21/2019    Procedure: RIGHT TOTAL HIP ARTHROPLASTY;  Surgeon: Conrado Gomez MD;  Location: [facility];  Service: Orthopedics     C TOTAL HIP ARTHROPLASTY Left 3/16/2020    Procedure: LEFT TOTAL HIP ARTHROPLASTY;  Surgeon: Conrado Gomez MD;  Location: [facility];   Service: Orthopedics     CHOLECYSTECTOMY       COLONOSCOPY       EYE SURGERY Right     cataract     JOINT REPLACEMENT  10/2019    RTHA     LITHOTRIPSY  2019     OTHER SURGICAL HISTORY  2009    lipoma removalforehead     OTHER SURGICAL HISTORY      spinal fluid leak     [facility] CYSTO/URETERO W/LITHOTRIPSY &INDWELL STENT INSRT Right 3/27/2019    Procedure: CYSTOSCOPY RIGHT RETROGRADE PYELOGRAM, RIGHT URETEROSCOPY WITH LASER  LITHOTRIPSY STONE EXTRACTION AND RIGHT STENT EXCHANGE;  Surgeon: Zia Coyle MD;  Location: [facility];  Service: Urology           CURRENT MEDICATIONS:    No current facility-administered medications for this encounter.    Current Outpatient Medications:      ondansetron (ZOFRAN-ODT) 4 MG ODT tab, Take 1 tablet (4 mg) by mouth every 8 hours as needed for nausea, Disp: 12 tablet, Rfl: 0     oxyCODONE (ROXICODONE) 5 MG tablet, Take 1 tablet (5 mg) by mouth every 6 hours as needed for severe pain If pain is not improved with acetaminophen and ibuprofen., Disp: 8 tablet, Rfl: 0     acetaminophen (TYLENOL) 500 MG tablet, [ACETAMINOPHEN (TYLENOL) 500 MG TABLET] Take 2 tablets (1,000 mg total) by mouth 3 (three) times a day., Disp: , Rfl: 0     aspirin 325 MG tablet, [ASPIRIN 325 MG TABLET] Take 1 tablet (325 mg total) by mouth 2 (two) times a day with meals., Disp: , Rfl: 0     chlorthalidone (HYGROTEN) 25 MG tablet, [CHLORTHALIDONE (HYGROTEN) 25 MG TABLET] Take 25 mg by mouth daily., Disp: , Rfl:      cholecalciferol, vitamin D3, 1,000 unit (25 mcg) tablet, [CHOLECALCIFEROL, VITAMIN D3, 1,000 UNIT (25 MCG) TABLET] Take 1,000 Units by mouth daily., Disp: , Rfl:      lisinopril (PRINIVIL,ZESTRIL) 20 MG tablet, [LISINOPRIL (PRINIVIL,ZESTRIL) 20 MG TABLET] Take 20 mg by mouth daily., Disp: , Rfl:      multivitamin therapeutic tablet, [MULTIVITAMIN THERAPEUTIC TABLET] Take 1 tablet by mouth daily., Disp: , Rfl:      oxyCODONE (ROXICODONE) 5 MG immediate release tablet, [OXYCODONE (ROXICODONE) 5  MG IMMEDIATE RELEASE TABLET] Take 1 tablet (5 mg total) by mouth every 4 (four) hours as needed for pain (Take for breakthrough pain not controlled by Tylenol and Tramadol)., Disp: 10 tablet, Rfl: 0     potassium chloride (K-DUR,KLOR-CON) 20 MEQ tablet, [POTASSIUM CHLORIDE (K-DUR,KLOR-CON) 20 MEQ TABLET] Take 20 mEq by mouth 2 (two) times a day. , Disp: , Rfl:      rosuvastatin (CRESTOR) 10 MG tablet, [ROSUVASTATIN (CRESTOR) 10 MG TABLET] Take 10 mg by mouth at bedtime., Disp: , Rfl:      traMADoL (ULTRAM) 50 mg tablet, [TRAMADOL (ULTRAM) 50 MG TABLET] Take 1 tablet (50 mg total) by mouth every 6 (six) hours as needed for pain., Disp: 30 tablet, Rfl: 0      ALLERGIES:  No Known Allergies    FAMILY HISTORY:  No family history on file.    SOCIAL HISTORY:   Social History     Socioeconomic History     Marital status:      Spouse name: Not on file     Number of children: Not on file     Years of education: Not on file     Highest education level: Not on file   Occupational History     Not on file   Tobacco Use     Smoking status: Former Smoker     Packs/day: 0.00     Years: 30.00     Pack years: 0.00     Quit date: 2012     Years since quittin.9     Smokeless tobacco: Never Used     Tobacco comment: cigars, very occasional, maybe 1 x week   Substance and Sexual Activity     Alcohol use: Yes     Comment: Alcoholic Drinks/day: 2 drinks per month     Drug use: No     Sexual activity: Not on file   Other Topics Concern     Not on file   Social History Narrative     Not on file     Social Determinants of Health     Financial Resource Strain: Not on file   Food Insecurity: Not on file   Transportation Needs: Not on file   Physical Activity: Not on file   Stress: Not on file   Social Connections: Not on file   Intimate Partner Violence: Not on file   Housing Stability: Not on file       VITALS:  Patient Vitals for the past 24 hrs:   BP Temp Temp src Pulse Resp SpO2 Height Weight   21 1400 119/67 -- -- 75  "-- 95 % -- --   12/13/21 1345 117/69 -- -- 84 -- 91 % -- --   12/13/21 1148 (!) 135/98 98.8  F (37.1  C) Oral 79 18 96 % 1.778 m (5' 10\") 86.6 kg (191 lb)       PHYSICAL EXAM  Constitutional: Well developed, Well nourished, NAD  HENT: Normocephalic, Atraumatic, Bilateral external ears normal, wearing mask in light of COVID-19 pandemic   Neck: Normal range of motion, No tenderness, Supple, No stridor.  Eyes: Conjunctiva normal, No discharge.   Respiratory: Normal breath sounds, No respiratory distress, No wheezing, Speaks full sentences easily. No cough.  Cardiovascular: Normal heart rate, Regular rhythm, No murmurs, No rubs, No gallops. Chest wall nontender.  GI: Soft, No tenderness, No masses, right CVA tenderness. No rebound or guarding.  Musculoskeletal: No edema. No cyanosis, No clubbing. Good range of motion in all major joints. No tenderness to palpation or major deformities noted. No tenderness of the CTLS spine.   Integument: Warm, Dry, No erythema, No rash. No petechiae.  Neurologic: Alert & oriented x 3, Normal motor function, Normal sensory function, No focal deficits noted. Normal gait.  Psychiatric: Affect normal, Judgment normal, Mood normal. Cooperative.    LAB:  All pertinent labs reviewed and interpreted.  Recent Results (from the past 24 hour(s))   Basic metabolic panel    Collection Time: 12/13/21  1:28 PM   Result Value Ref Range    Sodium 143 136 - 145 mmol/L    Potassium 3.6 3.5 - 5.0 mmol/L    Chloride 102 98 - 107 mmol/L    Carbon Dioxide (CO2) 30 22 - 31 mmol/L    Anion Gap 11 5 - 18 mmol/L    Urea Nitrogen 23 8 - 28 mg/dL    Creatinine 1.30 0.70 - 1.30 mg/dL    Calcium 9.8 8.5 - 10.5 mg/dL    Glucose 128 (H) 70 - 125 mg/dL    GFR Estimate 55 (L) >60 mL/min/1.73m2   CRP inflammation    Collection Time: 12/13/21  1:28 PM   Result Value Ref Range    CRP 5.2 (H) 0.0-<0.8 mg/dL   CBC (+ platelets, no diff)    Collection Time: 12/13/21  1:28 PM   Result Value Ref Range    WBC Count 9.9 4.0 - 11.0 " 10e3/uL    RBC Count 5.55 4.40 - 5.90 10e6/uL    Hemoglobin 16.4 13.3 - 17.7 g/dL    Hematocrit 49.4 40.0 - 53.0 %    MCV 89 78 - 100 fL    MCH 29.5 26.5 - 33.0 pg    MCHC 33.2 31.5 - 36.5 g/dL    RDW 12.8 10.0 - 15.0 %    Platelet Count 249 150 - 450 10e3/uL   Asymptomatic COVID-19 Virus (Coronavirus) by PCR Nasopharyngeal    Collection Time: 12/13/21  2:11 PM    Specimen: Nasopharyngeal; Swab   Result Value Ref Range    SARS CoV2 PCR Negative Negative         RADIOLOGY:  Reviewed all pertinent imaging. Please see official radiology report.    EXAM: CT ABDOMEN PELVIS STONE PROTOCOL WO   LOCATION: [facility]   DATE/TIME: 12/13/2021 10:53 AM     IMPRESSION:   1.  There is an 8 x 7 mm stone in the proximal right ureter. Moderate obstruction above this level.     2.  Nonobstructing stone lower pole left kidney.        I, Olinda Moseley, am serving as a scribe to document services personally performed by Kandi Nance PA-C based on my observation and the provider's statements to me. I, Kandi Nance PA-C attest that Olinda Moseley is acting in a scribe capacity, has observed my performance of the services and has documented them in accordance with my direction.    Kandi Nance PA-C  Emergency Medicine  [facility]  12/13/2021       Kandi Nance PA-C  12/13/21 5106

## 2022-02-11 ENCOUNTER — TRANSFERRED RECORDS (OUTPATIENT)
Dept: HEALTH INFORMATION MANAGEMENT | Facility: CLINIC | Age: 72
End: 2022-02-11

## 2022-02-17 PROBLEM — N20.0 CALCIUM OXALATE MONOHYDRATE KIDNEY STONES: Status: ACTIVE | Noted: 2019-11-18

## 2022-02-17 PROBLEM — G35 MULTIPLE SCLEROSIS (H): Status: ACTIVE | Noted: 2019-11-18

## 2022-05-22 ENCOUNTER — HEALTH MAINTENANCE LETTER (OUTPATIENT)
Age: 72
End: 2022-05-22

## 2022-09-25 ENCOUNTER — HEALTH MAINTENANCE LETTER (OUTPATIENT)
Age: 72
End: 2022-09-25

## 2022-11-23 ENCOUNTER — HOSPITAL ENCOUNTER (EMERGENCY)
Facility: CLINIC | Age: 72
Discharge: HOME | End: 2022-11-23
Attending: EMERGENCY MEDICINE | Admitting: EMERGENCY MEDICINE
Payer: MEDICARE

## 2022-11-23 ENCOUNTER — APPOINTMENT (OUTPATIENT)
Dept: CT IMAGING | Facility: CLINIC | Age: 72
End: 2022-11-23
Attending: PHYSICIAN ASSISTANT
Payer: MEDICARE

## 2022-11-23 VITALS
BODY MASS INDEX: 27.2 KG/M2 | SYSTOLIC BLOOD PRESSURE: 138 MMHG | WEIGHT: 190 LBS | DIASTOLIC BLOOD PRESSURE: 82 MMHG | RESPIRATION RATE: 18 BRPM | TEMPERATURE: 99 F | HEART RATE: 93 BPM | HEIGHT: 70 IN | OXYGEN SATURATION: 95 %

## 2022-11-23 DIAGNOSIS — M62.81 GENERALIZED MUSCLE WEAKNESS: ICD-10-CM

## 2022-11-23 LAB
ALBUMIN UR-MCNC: 20 MG/DL
ANION GAP SERPL CALCULATED.3IONS-SCNC: 8 MMOL/L (ref 5–18)
APPEARANCE UR: CLEAR
ATRIAL RATE - MUSE: 104 BPM
BACTERIA #/AREA URNS HPF: ABNORMAL /HPF
BILIRUB UR QL STRIP: NEGATIVE
BUN SERPL-MCNC: 17 MG/DL (ref 8–28)
CALCIUM SERPL-MCNC: 9.4 MG/DL (ref 8.5–10.5)
CHLORIDE BLD-SCNC: 97 MMOL/L (ref 98–107)
CO2 SERPL-SCNC: 31 MMOL/L (ref 22–31)
COLOR UR AUTO: YELLOW
CREAT SERPL-MCNC: 0.89 MG/DL (ref 0.7–1.3)
DIASTOLIC BLOOD PRESSURE - MUSE: NORMAL MMHG
ERYTHROCYTE [DISTWIDTH] IN BLOOD BY AUTOMATED COUNT: 13.2 % (ref 10–15)
GFR SERPL CREATININE-BSD FRML MDRD: >90 ML/MIN/1.73M2
GLUCOSE BLD-MCNC: 147 MG/DL (ref 70–125)
GLUCOSE UR STRIP-MCNC: 50 MG/DL
HCT VFR BLD AUTO: 51.5 % (ref 40–53)
HGB BLD-MCNC: 16.6 G/DL (ref 13.3–17.7)
HGB UR QL STRIP: NEGATIVE
INTERPRETATION ECG - MUSE: NORMAL
KETONES UR STRIP-MCNC: NEGATIVE MG/DL
LEUKOCYTE ESTERASE UR QL STRIP: NEGATIVE
MAGNESIUM SERPL-MCNC: 2.2 MG/DL (ref 1.8–2.6)
MCH RBC QN AUTO: 29.1 PG (ref 26.5–33)
MCHC RBC AUTO-ENTMCNC: 32.2 G/DL (ref 31.5–36.5)
MCV RBC AUTO: 90 FL (ref 78–100)
MUCOUS THREADS #/AREA URNS LPF: PRESENT /LPF
NITRATE UR QL: NEGATIVE
P AXIS - MUSE: 77 DEGREES
PH UR STRIP: 7 [PH] (ref 5–7)
PLATELET # BLD AUTO: 216 10E3/UL (ref 150–450)
POTASSIUM BLD-SCNC: 3.8 MMOL/L (ref 3.5–5)
PR INTERVAL - MUSE: 184 MS
QRS DURATION - MUSE: 94 MS
QT - MUSE: 356 MS
QTC - MUSE: 468 MS
R AXIS - MUSE: 31 DEGREES
RBC # BLD AUTO: 5.7 10E6/UL (ref 4.4–5.9)
RBC URINE: 11 /HPF
SODIUM SERPL-SCNC: 136 MMOL/L (ref 136–145)
SP GR UR STRIP: 1.02 (ref 1–1.03)
SYSTOLIC BLOOD PRESSURE - MUSE: NORMAL MMHG
T AXIS - MUSE: 87 DEGREES
UROBILINOGEN UR STRIP-MCNC: <2 MG/DL
VENTRICULAR RATE- MUSE: 104 BPM
WBC # BLD AUTO: 5.1 10E3/UL (ref 4–11)
WBC URINE: 1 /HPF

## 2022-11-23 PROCEDURE — G1010 CDSM STANSON: HCPCS

## 2022-11-23 PROCEDURE — 85027 COMPLETE CBC AUTOMATED: CPT | Performed by: PHYSICIAN ASSISTANT

## 2022-11-23 PROCEDURE — 93005 ELECTROCARDIOGRAM TRACING: CPT | Performed by: EMERGENCY MEDICINE

## 2022-11-23 PROCEDURE — 81001 URINALYSIS AUTO W/SCOPE: CPT | Performed by: PHYSICIAN ASSISTANT

## 2022-11-23 PROCEDURE — 93005 ELECTROCARDIOGRAM TRACING: CPT | Performed by: PHYSICIAN ASSISTANT

## 2022-11-23 PROCEDURE — 83735 ASSAY OF MAGNESIUM: CPT | Performed by: PHYSICIAN ASSISTANT

## 2022-11-23 PROCEDURE — 36415 COLL VENOUS BLD VENIPUNCTURE: CPT | Performed by: PHYSICIAN ASSISTANT

## 2022-11-23 PROCEDURE — 99285 EMERGENCY DEPT VISIT HI MDM: CPT | Mod: 25

## 2022-11-23 PROCEDURE — 80048 BASIC METABOLIC PNL TOTAL CA: CPT | Performed by: PHYSICIAN ASSISTANT

## 2022-11-23 ASSESSMENT — ENCOUNTER SYMPTOMS: WEAKNESS: 1

## 2022-11-23 ASSESSMENT — ACTIVITIES OF DAILY LIVING (ADL): ADLS_ACUITY_SCORE: 35

## 2022-11-23 NOTE — DISCHARGE INSTRUCTIONS
Encourage rest and fluids.  Use walker for support always.  Call your neurologist in the next few days and set up a follow-up appointment in the next week or 2.  See your doctor, neurologist, or return if worse or problems.

## 2022-11-23 NOTE — ED TRIAGE NOTES
Patient here after having increased weakness for the past 2 days, patient had a MRI recently showing R sided brain loss and has a hx of MS.  Wife reports that patient fell x3 yesterday.  Mary Kamara RN.......11/23/2022 10:09 AM     Triage Assessment     Row Name 11/23/22 1004       Triage Assessment (Adult)    Airway WDL WDL       Respiratory WDL    Respiratory WDL WDL       Skin Circulation/Temperature WDL    Skin Circulation/Temperature WDL WDL       Cardiac WDL    Cardiac WDL WDL       Peripheral/Neurovascular WDL    Peripheral Neurovascular WDL WDL       Cognitive/Neuro/Behavioral WDL    Cognitive/Neuro/Behavioral WDL WDL

## 2022-11-23 NOTE — ED PROVIDER NOTES
EMERGENCY DEPARTMENT ENCOUNTER      NAME: Iraj De Leon  AGE: 71 year old male  YOB: 1950  MRN: [id number]  EVALUATION DATE & TIME: 2022 11:05 AM    PCP: Chang Rojas    ED PROVIDER: Tashi Marks M.D.      Chief Complaint   Patient presents with     Generalized Weakness         FINAL IMPRESSION:  1.  Acute general weakness.  2.  Recurrent falls.      ED COURSE & MEDICAL DECISION MAKIN:18 AM  I met with the patient to gather history and to perform my initial exam. We discussed plans for the ED course, including diagnostic testing and treatment. PPE worn: cloth mask.  Patient with falls x3 yesterday.  No complaints or injuries from this.  Generalized weakness for the last 2 days.  Patient having to use a walker to get around.  Reportedly had a recent MRI that showed significant right-sided atrophy.  History of underlying MS which causes minimal problems.  2:06 PM.  Head CT without acute findings.  Atrophy is present and more pronounced in the right temporal areas this could be consistent with old stroke, old injury, early dementia, etc.  CBC and chemistries unremarkable.  Urinalysis and unremarkable.  Patient is discharged home.  We will have him follow-up with his neurologist.  We did talk about encouraging fluids and using a walker for extra support.  Patient and family in agreement with the plan.    Pertinent Labs & Imaging studies reviewed. (See chart for details)      71 year old male presents to the Emergency Department for evaluation of weakness and falls.    At the conclusion of the encounter I discussed the results of all of the tests and the disposition. The questions were answered. The patient or family acknowledged understanding and was agreeable with the care plan.     Medical Decision Making    History:    Supplemental history from: N/A    External Record(s) reviewed: Inpatient Record: Inpatient computer records were reviewed.    Work Up:    Chart documentation includes  "differential considered and any EKGs or imaging interpreted by provider.  Differential diagnosis includes infection, electrolyte abnormalities, flareup of his MS, stroke, other pathology.    In additional to work up documented, I considered the following work up: See chart documentation, if applicable.    External consultation:    Discussion of management with another provider: See chart documentation, if applicable    Complicating factors:    Care impacted by chronic illness: None    Care affected by social determinants of health: N/A    Disposition considerations: Possible admission versus discharge.  Evaluation pending.        MEDICATIONS GIVEN IN THE EMERGENCY:  Medications - No data to display    NEW PRESCRIPTIONS STARTED AT TODAY'S ER VISIT  New Prescriptions    No medications on file          =================================================================    HPI    Patient information was obtained from: patient    Use of : N/A       Iraj De Leon is a 71 year old male with a pertinent history of HTN, HLD, hx of basal cell carcinoma, type II diabetes mellitus, multiple sclerosis, cerebralspinal fluid leak, abdominal aortic aneurysm, s/p total hip arthoplasty, arthritis, mild neurocognitive disorder, calcium oxalate monohydrate kidney stones, and chronic left shoulder pain who presents to this ED via walk-in for evaluation of generalized weakness.    Patient reports increased generalized weakness for the past 2 day. On Monday (11/21), he states that he was using his walker but had difficultly ambulating than at baseline. He was experiencing right sided \"head pain\" at that time. He recently had an MRI done that showed right sided brain atrophy. They did not mention what caused this. He has a history of MS but denies any recent flare up. Per wife, patient had three falls yesterday (11/22). Patient comes to the ED for worsening generalize weakness. He does not identify any waxing or waning symptoms " otherwise, exacerbating or alleviating features,associated symptoms except as mentioned. Pateint denies leg swelling and any pain related complaints.    REVIEW OF SYSTEMS   Review of Systems   Cardiovascular: Negative for leg swelling.   Neurological: Positive for weakness.        Positive for right sided head pain   All other systems reviewed and are negative.       PAST MEDICAL HISTORY:  Past Medical History:   Diagnosis Date     Arthritis      Cancer (H) 02/2020    basal cell Moh's forehead     Diabetes mellitus (H)     type 2, diet controlled as of March 2019     Headache 2014    Due to spontaneous spinal fluid leak     Hyperlipidemia      Hypertension      Kidney stones 2019     Multiple sclerosis (H)      Osteoarthritis     bilateral hips     Pituitary microadenoma (H)      Posterior vitreous detachment        PAST SURGICAL HISTORY:  Past Surgical History:   Procedure Laterality Date     BASAL CELL CARCINOMA EXCISION  02/20/2020    Moh's procedure to forehead     CHOLECYSTECTOMY       COLONOSCOPY       EYE SURGERY Right     cataract     JOINT REPLACEMENT  10/2019    RTHA     LITHOTRIPSY  2019     OTHER SURGICAL HISTORY  2009    lipoma removalforehead     OTHER SURGICAL HISTORY      spinal fluid leak     PA CYSTO/URETERO W/LITHOTRIPSY &INDWELL STENT INSRT Right 3/27/2019    Procedure: CYSTOSCOPY RIGHT RETROGRADE PYELOGRAM, RIGHT URETEROSCOPY WITH LASER  LITHOTRIPSY STONE EXTRACTION AND RIGHT STENT EXCHANGE;  Surgeon: Zia Coyle MD;  Location: [facility];  Service: Urology     [facility] TOTAL HIP ARTHROPLASTY Right 10/21/2019    Procedure: RIGHT TOTAL HIP ARTHROPLASTY;  Surgeon: Conrado Gomez MD;  Location: [facility] OR;  Service: Orthopedics     [facility] TOTAL HIP ARTHROPLASTY Left 3/16/2020    Procedure: LEFT TOTAL HIP ARTHROPLASTY;  Surgeon: Conrado Gomez MD;  Location: [facility];  Service: Orthopedics           CURRENT MEDICATIONS:    acetaminophen (TYLENOL) 500 MG  "tablet  aspirin 325 MG tablet  chlorthalidone (HYGROTEN) 25 MG tablet  cholecalciferol, vitamin D3, 1,000 unit (25 mcg) tablet  lisinopril (PRINIVIL,ZESTRIL) 20 MG tablet  multivitamin therapeutic tablet  ondansetron (ZOFRAN-ODT) 4 MG ODT tab  oxyCODONE (ROXICODONE) 5 MG immediate release tablet  oxyCODONE (ROXICODONE) 5 MG tablet  potassium chloride (K-DUR,KLOR-CON) 20 MEQ tablet  rosuvastatin (CRESTOR) 10 MG tablet  traMADoL (ULTRAM) 50 mg tablet        ALLERGIES:  No Known Allergies    FAMILY HISTORY:  No family history on file.    SOCIAL HISTORY:   Social History     Socioeconomic History     Marital status:      Spouse name: None     Number of children: None     Years of education: None     Highest education level: None   Tobacco Use     Smoking status: Former     Packs/day: 0.00     Years: 30.00     Pack years: 0.00     Types: Cigarettes     Quit date: 1/1/2012     Years since quitting: 10.9     Smokeless tobacco: Never     Tobacco comments:     cigars, very occasional, maybe 1 x week   Substance and Sexual Activity     Alcohol use: Yes     Comment: Alcoholic Drinks/day: 2 drinks per month     Drug use: No   Former smoker.  Occasional alcohol.  No drugs.    VITALS:  /58   Pulse 53   Temp 99  F (37.2  C) (Oral)   Resp 18   Ht 1.778 m (5' 10\")   Wt 86.2 kg (190 lb)   SpO2 95%   BMI 27.26 kg/m      PHYSICAL EXAM    Vital Signs:  /58   Pulse 53   Temp 99  F (37.2  C) (Oral)   Resp 18   Ht 1.778 m (5' 10\")   Wt 86.2 kg (190 lb)   SpO2 95%   BMI 27.26 kg/m    General:  On entering the room he is in no apparent distress.    Neck:  Neck supple with full range of motion and nontender.    Back:  Back and spine are nontender.  No costovertebral angle tenderness.    HEENT:  Oropharynx clear with moist mucous membranes.  HEENT unremarkable.    Pulmonary:  Chest clear to auscultation without rhonchi rales or wheezing.    Cardiovascular:  Cardiac regular rate and rhythm without murmurs rubs " or gallops.    Abdomen:  Abdomen soft nontender.  There is no rebound or guarding.    Muskuloskeletal:  he moves all 4 without any difficulty and has normal neurovascular exams.  Extremities without clubbing, cyanosis, or edema.  Legs and calves are nontender.    Neuro:  he is alert and oriented ×3 and moves all extremities symmetrically.  Strength 5/5 in all 4 extremities.  Sensation intact in all 4 extremities.  Cranial nerves II through XII intact and equal bilaterally.  Psych:  Normal affect.    Skin:  Unremarkable and warm and dry.       LAB:  All pertinent labs reviewed and interpreted.  Labs Ordered and Resulted from Time of ED Arrival to Time of ED Departure   BASIC METABOLIC PANEL - Abnormal       Result Value    Sodium 136      Potassium 3.8      Chloride 97 (*)     Carbon Dioxide (CO2) 31      Anion Gap 8      Urea Nitrogen 17      Creatinine 0.89      Calcium 9.4      Glucose 147 (*)     GFR Estimate >90     UA MACROSCOPIC WITH REFLEX TO MICRO AND CULTURE - Abnormal    Color Urine Yellow      Appearance Urine Clear      Glucose Urine 50 (*)     Bilirubin Urine Negative      Ketones Urine Negative      Specific Gravity Urine 1.022      Blood Urine Negative      pH Urine 7.0      Protein Albumin Urine 20 (*)     Urobilinogen Urine <2.0      Nitrite Urine Negative      Leukocyte Esterase Urine Negative      Bacteria Urine Few (*)     Mucus Urine Present (*)     RBC Urine 11 (*)     WBC Urine 1     CBC WITH PLATELETS - Normal    WBC Count 5.1      RBC Count 5.70      Hemoglobin 16.6      Hematocrit 51.5      MCV 90      MCH 29.1      MCHC 32.2      RDW 13.2      Platelet Count 216     MAGNESIUM - Normal    Magnesium 2.2         RADIOLOGY:  Reviewed all pertinent imaging. Please see official radiology report.  Head CT w/o contrast   Final Result   IMPRESSION:   1.  No acute intracranial abnormality.      2.  Advanced/severe volume loss in the anterior right temporal lobe with a mild to moderate volume loss in  the anterior left temporal lobe appears more conspicuous than the background mild diffuse parenchymal volume loss elsewhere. This pattern of volume    loss is nonspecific, and may relate to remote injury. Advanced temporal lobe volume loss can also be seen in the setting of Alzheimer's, and clinical correlation for dementia would be of benefit.      3.  Severe burden chronic small vessel ischemic change.                 EKG:                I, Elaine Saldivar Kirit, am serving as a scribe to document services personally performed by Dr. Marks based on my observation and the provider's statements to me. I, Tashi Marks MD attest that Elaine Beth is acting in a scribe capacity, has observed my performance of the services and has documented them in accordance with my direction.    Tashi Marks M.D.  Emergency Medicine  [facility] EMERGENCY ROOM  [address] [address]  [phone]  Dept: [phone]       Tashi Marks MD  11/23/22 3036

## 2023-01-06 ENCOUNTER — TRANSFERRED RECORDS (OUTPATIENT)
Dept: HEALTH INFORMATION MANAGEMENT | Facility: CLINIC | Age: 73
End: 2023-01-06

## 2023-02-04 ENCOUNTER — HEALTH MAINTENANCE LETTER (OUTPATIENT)
Age: 73
End: 2023-02-04

## 2023-08-06 ENCOUNTER — HEALTH MAINTENANCE LETTER (OUTPATIENT)
Age: 73
End: 2023-08-06

## 2023-08-16 ENCOUNTER — TRANSFERRED RECORDS (OUTPATIENT)
Dept: HEALTH INFORMATION MANAGEMENT | Facility: CLINIC | Age: 73
End: 2023-08-16

## 2023-08-21 ENCOUNTER — TRANSFERRED RECORDS (OUTPATIENT)
Dept: HEALTH INFORMATION MANAGEMENT | Facility: CLINIC | Age: 73
End: 2023-08-21

## 2023-09-27 ENCOUNTER — MEDICAL CORRESPONDENCE (OUTPATIENT)
Dept: HEALTH INFORMATION MANAGEMENT | Facility: CLINIC | Age: 73
End: 2023-09-27
Payer: MEDICARE

## 2023-09-28 ENCOUNTER — TRANSCRIBE ORDERS (OUTPATIENT)
Dept: OTHER | Age: 73
End: 2023-09-28

## 2023-09-28 DIAGNOSIS — F09 COGNITIVE DYSFUNCTION: Primary | ICD-10-CM

## 2023-09-28 DIAGNOSIS — R29.898 RIGHT LEG WEAKNESS: ICD-10-CM

## 2023-09-28 DIAGNOSIS — G93.89 ENCEPHALOMALACIA: ICD-10-CM

## 2023-09-28 DIAGNOSIS — G35 MS (MULTIPLE SCLEROSIS) (H): ICD-10-CM

## 2023-12-22 ENCOUNTER — NURSE TRIAGE (OUTPATIENT)
Dept: NURSING | Facility: CLINIC | Age: 73
End: 2023-12-22
Payer: MEDICARE

## 2023-12-22 NOTE — TELEPHONE ENCOUNTER
Nurse Triage SBAR    Is this a 2nd Level Triage? YES, LICENSED PRACTITIONER REVIEW IS REQUIRED    Situation: Speech more garbled     Background: Wife calling  in. She noticed his speech was more garbled last week and this morning he was more unstable while walking. She states he has MS and hip problems. He has no other complaints but she states he usually will not complain about anything.   He is not established with [facility] yet. He has a future appointment in [place].   Updated records in Care Everywhere-he has been seen at [facility] and had neuro psych testing done.   History of MS, MCI. Last visit with PCP on 10/9/23. They live in senior housing now in [place].    Assessment: Change in speech    Protocol Recommended Disposition:   Go To Office Now    Recommendation: Please call wife at [phone] or  [phone] and let her know if there are earlier appointments in [place] or   [place]. I have informed her that if there is no appts today she should bring him in for evaluation at an ED.       Routed to provider/Team    Brenda Dudley RN      Reason for Disposition   Neurologic deficit of gradual onset (e.g., days to weeks), ANY of the following: * Weakness of the face, arm, or leg on one side of the body* Numbness of the face, arm, or leg on one side of the body* Loss of speech or garbled speech    Additional Information   Negative: Difficult to awaken or acting confused (e.g., disoriented, slurred speech)   Negative: New neurologic deficit that is present NOW, sudden onset of ANY of the following: * Weakness of the face, arm, or leg on one side of the body* Numbness of the face, arm, or leg on one side of the body* Loss of speech or garbled speech   Negative: Sounds like a life-threatening emergency to the triager   Negative: SEVERE weakness (i.e., unable to walk or barely able to walk, requires support) and new-onset or worsening   Negative: Confusion, disorientation, or hallucinations is main symptom   Negative:  "Dizziness is main symptom   Negative: Followed a head injury within last 3 days   Negative: Headache (with neurologic deficit)   Negative: Unable to urinate (or only a few drops) and bladder feels very full   Negative: Loss of bladder or bowel control (urine or bowel incontinence; wetting self, leaking stool) of new-onset   Negative: Back pain with numbness (loss of sensation) in groin or rectal area   Negative: Neurologic deficit that was brief (now gone), ANY of the following: * Weakness of the face, arm, or leg on one side of the body * Numbness of the face, arm, or leg on one side of the body * Loss of speech or garbled speech   Negative: Patient sounds very sick or weak to the triager    Answer Assessment - Initial Assessment Questions  1. SYMPTOM: \"What is the main symptom you are concerned about?\" (e.g., weakness, numbness)      Garbled speech  2. ONSET: \"When did this start?\" (minutes, hours, days; while sleeping)      Last week  3. LAST NORMAL: \"When was the last time you (the patient) were normal (no symptoms)?\"      Last week  4. PATTERN \"Does this come and go, or has it been constant since it started?\"  \"Is it present now?\"      intermittent  5. CARDIAC SYMPTOMS: \"Have you had any of the following symptoms: chest pain, difficulty breathing, palpitations?\"      none  6. NEUROLOGIC SYMPTOMS: \"Have you had any of the following symptoms: headache, dizziness, vision loss, double vision, changes in speech, unsteady on your feet?\"      Unsteady today more than usual, stoic and doesn't want to do things  7. OTHER SYMPTOMS: \"Do you have any other symptoms?\"      none  8. PREGNANCY: \"Is there any chance you are pregnant?\" \"When was your last menstrual period?\"      Na  \line   Protocols used: Neurologic Deficit-A-OH    "

## 2023-12-22 NOTE — TELEPHONE ENCOUNTER
Call placed to Patient.  No answer.  Left message with call back number for Patient to return call.  Martin Munoz RN

## 2023-12-27 NOTE — TELEPHONE ENCOUNTER
Call placed to patient   No answer; voicemail left requesting call back to Clinic RN at [phone]    Tal Lai, Clinic RN  [facility]

## 2024-01-18 ENCOUNTER — DOCUMENTATION ONLY (OUTPATIENT)
Dept: OTHER | Facility: CLINIC | Age: 74
End: 2024-01-18

## 2024-01-18 ENCOUNTER — OFFICE VISIT (OUTPATIENT)
Dept: FAMILY MEDICINE | Facility: CLINIC | Age: 74
End: 2024-01-18
Payer: MEDICARE

## 2024-01-18 VITALS
SYSTOLIC BLOOD PRESSURE: 114 MMHG | BODY MASS INDEX: 30.19 KG/M2 | OXYGEN SATURATION: 98 % | DIASTOLIC BLOOD PRESSURE: 78 MMHG | HEART RATE: 72 BPM | RESPIRATION RATE: 16 BRPM | HEIGHT: 70 IN | TEMPERATURE: 96.9 F | WEIGHT: 210.9 LBS

## 2024-01-18 DIAGNOSIS — G30.9 ALZHEIMER'S DISEASE (H): ICD-10-CM

## 2024-01-18 DIAGNOSIS — G35 MULTIPLE SCLEROSIS (H): ICD-10-CM

## 2024-01-18 DIAGNOSIS — E11.9 TYPE 2 DIABETES MELLITUS WITHOUT COMPLICATION, WITHOUT LONG-TERM CURRENT USE OF INSULIN (H): Primary | ICD-10-CM

## 2024-01-18 DIAGNOSIS — G83.10 PARESIS OF LOWER EXTREMITY (H): ICD-10-CM

## 2024-01-18 DIAGNOSIS — Z00.00 ENCOUNTER FOR MEDICARE ANNUAL WELLNESS EXAM: ICD-10-CM

## 2024-01-18 DIAGNOSIS — E11.3291 TYPE 2 DIABETES MELLITUS WITH RIGHT EYE AFFECTED BY MILD NONPROLIFERATIVE RETINOPATHY WITHOUT MACULAR EDEMA, WITHOUT LONG-TERM CURRENT USE OF INSULIN (H): ICD-10-CM

## 2024-01-18 DIAGNOSIS — F02.80 ALZHEIMER'S DISEASE (H): ICD-10-CM

## 2024-01-18 DIAGNOSIS — Z29.11 NEED FOR VACCINATION AGAINST RESPIRATORY SYNCYTIAL VIRUS: ICD-10-CM

## 2024-01-18 DIAGNOSIS — I10 ESSENTIAL HYPERTENSION: ICD-10-CM

## 2024-01-18 DIAGNOSIS — M16.9 OSTEOARTHRITIS OF HIP, UNSPECIFIED LATERALITY, UNSPECIFIED OSTEOARTHRITIS TYPE: ICD-10-CM

## 2024-01-18 DIAGNOSIS — Z11.59 NEED FOR HEPATITIS C SCREENING TEST: ICD-10-CM

## 2024-01-18 PROBLEM — R41.89 IMPAIRED COGNITION: Status: ACTIVE | Noted: 2022-10-21

## 2024-01-18 LAB
ANION GAP SERPL CALCULATED.3IONS-SCNC: 9 MMOL/L (ref 7–15)
BUN SERPL-MCNC: 16 MG/DL (ref 8–23)
CALCIUM SERPL-MCNC: 9.9 MG/DL (ref 8.8–10.2)
CHLORIDE SERPL-SCNC: 101 MMOL/L (ref 98–107)
CHOLEST SERPL-MCNC: 136 MG/DL
CREAT SERPL-MCNC: 0.97 MG/DL (ref 0.67–1.17)
CREAT UR-MCNC: 98.3 MG/DL
DEPRECATED HCO3 PLAS-SCNC: 32 MMOL/L (ref 22–29)
EGFRCR SERPLBLD CKD-EPI 2021: 82 ML/MIN/1.73M2
FASTING STATUS PATIENT QL REPORTED: YES
GLUCOSE SERPL-MCNC: 138 MG/DL (ref 70–99)
HBA1C MFR BLD: 7.1 % (ref 0–5.6)
HDLC SERPL-MCNC: 53 MG/DL
LDLC SERPL CALC-MCNC: 62 MG/DL
MICROALBUMIN UR-MCNC: <12 MG/L
MICROALBUMIN/CREAT UR: NORMAL MG/G{CREAT}
NONHDLC SERPL-MCNC: 83 MG/DL
POTASSIUM SERPL-SCNC: 3.9 MMOL/L (ref 3.4–5.3)
SODIUM SERPL-SCNC: 142 MMOL/L (ref 135–145)
TRIGL SERPL-MCNC: 103 MG/DL

## 2024-01-18 PROCEDURE — 99204 OFFICE O/P NEW MOD 45 MIN: CPT | Mod: 25 | Performed by: FAMILY MEDICINE

## 2024-01-18 PROCEDURE — 36415 COLL VENOUS BLD VENIPUNCTURE: CPT | Performed by: FAMILY MEDICINE

## 2024-01-18 PROCEDURE — G0439 PPPS, SUBSEQ VISIT: HCPCS | Performed by: FAMILY MEDICINE

## 2024-01-18 PROCEDURE — 80061 LIPID PANEL: CPT | Performed by: FAMILY MEDICINE

## 2024-01-18 PROCEDURE — 82043 UR ALBUMIN QUANTITATIVE: CPT | Performed by: FAMILY MEDICINE

## 2024-01-18 PROCEDURE — 92551 PURE TONE HEARING TEST AIR: CPT | Performed by: FAMILY MEDICINE

## 2024-01-18 PROCEDURE — 82570 ASSAY OF URINE CREATININE: CPT | Performed by: FAMILY MEDICINE

## 2024-01-18 PROCEDURE — 80048 BASIC METABOLIC PNL TOTAL CA: CPT | Performed by: FAMILY MEDICINE

## 2024-01-18 PROCEDURE — 83036 HEMOGLOBIN GLYCOSYLATED A1C: CPT | Performed by: FAMILY MEDICINE

## 2024-01-18 RX ORDER — RESPIRATORY SYNCYTIAL VIRUS VACCINE 120MCG/0.5
0.5 KIT INTRAMUSCULAR ONCE
Qty: 1 EACH | Refills: 0 | Status: CANCELLED | OUTPATIENT
Start: 2024-01-18 | End: 2024-01-18

## 2024-01-18 RX ORDER — ASPIRIN 81 MG/1
81 TABLET ORAL DAILY
COMMUNITY
Start: 2024-01-18

## 2024-01-18 ASSESSMENT — PAIN SCALES - GENERAL: PAINLEVEL: NO PAIN (0)

## 2024-01-18 ASSESSMENT — ACTIVITIES OF DAILY LIVING (ADL): CURRENT_FUNCTION: NO ASSISTANCE NEEDED

## 2024-01-18 NOTE — PATIENT INSTRUCTIONS
Patient Education   Personalized Prevention Plan  You are due for the preventive services outlined below.  Your care team is available to assist you in scheduling these services.  If you have already completed any of these items, please share that information with your care team to update in your medical record.  Health Maintenance Due   Topic Date Due     A1C Lab  Never done     Cholesterol Lab  Never done     Kidney Microalbumin Urine Test  Never done     Diabetic Foot Exam  Never done     ANNUAL REVIEW OF HM ORDERS  Never done     Discuss Advance Care Planning  Never done     Hepatitis C Screening  Never done     LUNG CANCER SCREENING  Never done     RSV VACCINE (Pregnancy & 60+) (1 - 1-dose 60+ series) Never done     Basic Metabolic Panel  11/23/2023

## 2024-01-18 NOTE — PROGRESS NOTES
"Preventive Care Visit  [facility] SONIA Sinclair MD, Family Practice  Jan 18, 2024      SUBJECTIVE:   Iraj is a 73 year old, presenting for the following:  Medicare Visit        1/18/2024     8:52 AM   Additional Questions   Roomed by Sully Blanchard CMA     Are you in the first 12 months of your Medicare coverage?  No    History of Present Illness       Reason for visit:  Wellness visit    He eats 0-1 servings of fruits and vegetables daily.He consumes 2 sweetened beverage(s) daily.He exercises with enough effort to increase his heart rate 9 or less minutes per day.  He exercises with enough effort to increase his heart rate 3 or less days per week.   He is taking medications regularly.  He is not taking prescribed medications regularly due to None.  Healthy Habits:     In general, how would you rate your overall health?  Fair    Frequency of exercise:  4-5 days/week    Duration of exercise:  15-30 minutes    Do you usually eat at least 4 servings of fruit and vegetables a day, include whole grains    & fiber and avoid regularly eating high fat or \"junk\" foods?  Yes    Taking medications regularly:  0    Barriers to taking medications:  None    Medication side effects:  None    Ability to successfully perform activities of daily living:  No assistance needed    Home Safety:  No safety concerns identified    Hearing Impairment:  No hearing concerns    In the past 6 months, have you been bothered by leaking of urine?  No    In general, how would you rate your overall mental or emotional health?  Fair    Additional concerns today:  Yes (swelling in legs and feet)      HEARING FREQUENCY    Right Ear:      1000 Hz RESPONSE- on Level: 40 db (Conditioning sound)   1000 Hz: RESPONSE- on Level:   20 db    2000 Hz: RESPONSE- on Level: 25 db   4000 Hz: RESPONSE- on Level: 65 db    Left Ear:      4000 Hz: RESPONSE- on Level: 60 db   2000 Hz: RESPONSE- on Level: 25 db   1000 Hz: RESPONSE- on Level:   20 db     " 500 Hz: RESPONSE- on Level: 30 db    Right Ear:    500 Hz: RESPONSE- on Level: 35 db    Hearing Acuity: REFER    Hearing Assessment: abnormal--refer to audiology   Have you ever done Advance Care Planning? (For example, a Health Directive, POLST, or a discussion with a medical provider or your loved ones about your wishes): Yes, advance care planning is on file.     Fall risk  Fallen 2 or more times in the past year?: Yes  Any fall with injury in the past year?: No    Cognitive Screening Not appropriate due to known dementia    Do you have sleep apnea, excessive snoring or daytime drowsiness? : he does not sleep well at night. He does take maps during the day.    Reviewed and updated as needed this visit by clinical staff   Tobacco  Allergies    Med Hx  Surg Hx  Fam Hx  Soc Hx        Reviewed and updated as needed this visit by Provider                  Social History     Tobacco Use    Smoking status: Former     Packs/day: 0.00     Years: 30.00     Additional pack years: 0.00     Total pack years: 0.00     Types: Cigarettes     Quit date: 2012     Years since quittin.0    Smokeless tobacco: Never    Tobacco comments:     cigars, very occasional, maybe 1 x week   Substance Use Topics    Alcohol use: Yes     Comment: Alcoholic Drinks/day: 2 drinks per month              No data to display                   No data to display              Do you have a current opioid prescription? No  Do you use any other controlled substances or medications that are not prescribed by a provider? None        Current providers sharing in care for this patient include:  Patient Care Team:  Dez Sinclair MD as PCP - General (Family Practice)  Aurelio Pimentel MD as MD (Neurology)  Saeed Love MD as Referring Physician (Neurology)    The following health maintenance items are reviewed in Epic and correct as of today:  Health Maintenance   Topic Date Due    A1C  Never done    LIPID  Never done    MICROALBUMIN   "Never done    HEPATITIS C SCREENING  Never done    LUNG CANCER SCREENING  Never done    RSV VACCINE (Pregnancy & 60+) (1 - 1-dose 60+ series) Never done    BMP  11/23/2023    EYE EXAM  09/25/2024    DTAP/TDAP/TD IMMUNIZATION (5 - Td or Tdap) 12/22/2024    MEDICARE ANNUAL WELLNESS VISIT  01/18/2025    DIABETIC FOOT EXAM  01/18/2025    ANNUAL REVIEW OF HM ORDERS  01/18/2025    FALL RISK ASSESSMENT  01/18/2025    ADVANCE CARE PLANNING  01/18/2029    COLORECTAL CANCER SCREENING  12/31/2030    PHQ-2 (once per calendar year)  Completed    INFLUENZA VACCINE  Completed    Pneumococcal Vaccine: 65+ Years  Completed    ZOSTER IMMUNIZATION  Completed    AORTIC ANEURYSM SCREENING (SYSTEM ASSIGNED)  Completed    COVID-19 Vaccine  Completed    IPV IMMUNIZATION  Aged Out    HPV IMMUNIZATION  Aged Out    MENINGITIS IMMUNIZATION  Aged Out    RSV MONOCLONAL ANTIBODY  Aged Out       OBJECTIVE:   /78   Pulse 72   Temp 96.9  F (36.1  C) (Tympanic)   Resp 16   Ht 1.778 m (5' 10\")   Wt 95.7 kg (210 lb 14.4 oz)   SpO2 98%   BMI 30.26 kg/m     Estimated body mass index is 30.26 kg/m  as calculated from the following:    Height as of this encounter: 1.778 m (5' 10\").    Weight as of this encounter: 95.7 kg (210 lb 14.4 oz).  Review of Systems     Review of Systems  Constitutional, HEENT, cardiovascular, pulmonary, gi and gu systems are negative, except as otherwise noted.  Physical Exam  GENERAL: alert and no distress  NECK: no adenopathy, no asymmetry, masses, or scars  RESP: lungs clear to auscultation - no rales, rhonchi or wheezes  CV: regular rate and rhythm, normal S1 S2, no S3 or S4, no murmur, click or rub, no peripheral edema  ABDOMEN: soft, nontender, no hepatosplenomegaly, no masses and bowel sounds normal  MS: no gross musculoskeletal defects noted, no edema    Diagnostic Test Results:  Labs reviewed in Epic    ASSESSMENT / PLAN:   Need for vaccination against respiratory syncytial virus    Encounter for Medicare " annual wellness exam    Type 2 diabetes mellitus without complication, without long-term current use of insulin (H)  Good control.  Continue currant medications, continue to monitor.    - HEMOGLOBIN A1C; Future  - Lipid panel reflex to direct LDL Non-fasting; Future  - Albumin Random Urine Quantitative with Creat Ratio; Future  - FOOT EXAM  - HEMOGLOBIN A1C  - Lipid panel reflex to direct LDL Non-fasting  - Albumin Random Urine Quantitative with Creat Ratio    Need for hepatitis C screening test  - Hepatitis C Screen Reflex to HCV RNA Quant and Genotype; Future    Essential hypertension  Good control.  Continue currant medications, continue to monitor.    - BASIC METABOLIC PANEL; Future  - BASIC METABOLIC PANEL    Paresis of lower extremity (H)    Multiple sclerosis (H)  Good control.  Continue currant medications, continue to monitor.    - Adult Neurology  Referral; Future    Type 2 diabetes mellitus with right eye affected by mild nonproliferative retinopathy without macular edema, without long-term current use of insulin (H)  Good control.  Continue currant medications, continue to monitor.    Alzheimer's disease (H)  Good control.  Continue currant medications, continue to monitor.    - Adult Neurology  Referral; Future    Osteoarthritis of hip, unspecified laterality, unspecified osteoarthritis type      Patient has been advised of split billing requirements and indicates understanding: Yes      Counseling  Reviewed preventive health counseling, as reflected in patient instructions       Regular exercise       Healthy diet/nutrition       Vision screening       Hearing screening       Dental care       Bladder control       Fall risk prevention       Colon cancer screening       Prostate cancer screening        He reports that he quit smoking about 12 years ago. He has never used smokeless tobacco.      Appropriate preventive services were discussed with this patient, including applicable  screening as appropriate for fall prevention, nutrition, physical activity, Tobacco-use cessation, weight loss and cognition.  Checklist reviewing preventive services available has been given to the patient.    Reviewed patients plan of care and provided an AVS. The Intermediate Care Plan ( asthma action plan, low back pain action plan, and migraine action plan) for Iraj meets the Care Plan requirement. This Care Plan has been established and reviewed with the Patient.          Signed Electronically by: Dez Sinclair MD    Identified Health Risks  I have reviewed Opioid Use Disorder and Substance Use Disorder risk factors and made any needed referrals.

## 2024-01-19 DIAGNOSIS — E78.5 HYPERLIPIDEMIA LDL GOAL <100: Primary | ICD-10-CM

## 2024-01-21 RX ORDER — ROSUVASTATIN CALCIUM 10 MG/1
10 TABLET, COATED ORAL AT BEDTIME
Qty: 90 TABLET | Refills: 1 | Status: SHIPPED | OUTPATIENT
Start: 2024-01-21 | End: 2024-07-12

## 2024-01-25 ENCOUNTER — DOCUMENTATION ONLY (OUTPATIENT)
Dept: OTHER | Facility: CLINIC | Age: 74
End: 2024-01-25
Payer: MEDICARE

## 2024-01-25 ENCOUNTER — MYC MEDICAL ADVICE (OUTPATIENT)
Dept: FAMILY MEDICINE | Facility: CLINIC | Age: 74
End: 2024-01-25
Payer: MEDICARE

## 2024-01-29 DIAGNOSIS — I10 ESSENTIAL HYPERTENSION: Primary | ICD-10-CM

## 2024-01-29 DIAGNOSIS — T50.2X5A DIURETIC-INDUCED HYPOKALEMIA: ICD-10-CM

## 2024-01-29 DIAGNOSIS — E87.6 DIURETIC-INDUCED HYPOKALEMIA: ICD-10-CM

## 2024-01-30 RX ORDER — POTASSIUM CHLORIDE 1500 MG/1
20 TABLET, EXTENDED RELEASE ORAL 2 TIMES DAILY
Qty: 180 TABLET | Refills: 1 | Status: SHIPPED | OUTPATIENT
Start: 2024-01-30 | End: 2024-07-15

## 2024-01-31 DIAGNOSIS — T50.2X5A DIURETIC-INDUCED HYPOKALEMIA: ICD-10-CM

## 2024-01-31 DIAGNOSIS — E87.6 DIURETIC-INDUCED HYPOKALEMIA: ICD-10-CM

## 2024-02-01 RX ORDER — POTASSIUM CHLORIDE 1500 MG/1
20 TABLET, EXTENDED RELEASE ORAL 2 TIMES DAILY
Qty: 180 TABLET | Refills: 1 | OUTPATIENT
Start: 2024-02-01

## 2024-02-14 ENCOUNTER — OFFICE VISIT (OUTPATIENT)
Dept: NEUROLOGY | Facility: CLINIC | Age: 74
End: 2024-02-14
Attending: PSYCHIATRY & NEUROLOGY
Payer: MEDICARE

## 2024-02-14 VITALS
DIASTOLIC BLOOD PRESSURE: 86 MMHG | BODY MASS INDEX: 30.21 KG/M2 | HEIGHT: 70 IN | WEIGHT: 211 LBS | SYSTOLIC BLOOD PRESSURE: 135 MMHG | HEART RATE: 75 BPM

## 2024-02-14 DIAGNOSIS — F09 COGNITIVE DYSFUNCTION: ICD-10-CM

## 2024-02-14 DIAGNOSIS — R29.898 RIGHT LEG WEAKNESS: ICD-10-CM

## 2024-02-14 DIAGNOSIS — G30.9 ALZHEIMER'S DISEASE (H): ICD-10-CM

## 2024-02-14 DIAGNOSIS — G93.89 ENCEPHALOMALACIA: ICD-10-CM

## 2024-02-14 DIAGNOSIS — F02.80 ALZHEIMER'S DISEASE (H): ICD-10-CM

## 2024-02-14 DIAGNOSIS — F51.01 PRIMARY INSOMNIA: Primary | ICD-10-CM

## 2024-02-14 DIAGNOSIS — G35 MS (MULTIPLE SCLEROSIS) (H): ICD-10-CM

## 2024-02-14 DIAGNOSIS — G35 MULTIPLE SCLEROSIS (H): ICD-10-CM

## 2024-02-14 DIAGNOSIS — R41.9 COGNITIVE COMPLAINTS: ICD-10-CM

## 2024-02-14 PROCEDURE — 99205 OFFICE O/P NEW HI 60 MIN: CPT | Performed by: PSYCHIATRY & NEUROLOGY

## 2024-02-14 ASSESSMENT — MONTREAL COGNITIVE ASSESSMENT (MOCA)
WHAT LEVEL OF EDUCATION WAS ATTAINED: 0
11. FOR EACH PAIR OF WORDS, WHAT CATEGORY DO THEY BELONG TO (OUT OF 2): 2
8. SERIAL SUBTRACTION OF 7S: 3
7. [VIGILENCE] TAP WHEN HEARING DESIGNATED LETTER: 1
WHAT IS THE TOTAL SCORE (OUT OF 30): 25
10. [FLUENCY] NAME WORDS STARTING WITH DESIGNATED LETTER: 1
VISUOSPATIAL/EXECUTIVE SUBSCORE: 5
13. ORIENTATION SUBSCORE: 6
4. NAME EACH OF THE THREE ANIMALS SHOWN: 3
9. REPEAT EACH SENTENCE: 2
6. READ LIST OF DIGITS [FORWARD/BACKWARD]: 2
12. MEMORY INDEX SCORE: 0

## 2024-02-14 NOTE — NURSING NOTE
LUDIVINA COGNITIVE ASSESSMENT (MOCA)  Version 7.1 Original Version  VISUOSPATIAL/EXECUTIVE               COPY CUBE      [ 1   ]                                [  1  ] DRAW CLOCK (Ten past eleven)  (3 points)    [ 1   ]                    [  1  ]               [  1  ]       Contour            Numbers     Hands POINTS                  5 / 5   NAMING    [ 1  ]                                                                        [   1 ]                                             [ 1   ]  Lisoren Roblero                                Camel                    3 / 3   MEMORY Read list of words, subject must repeat them. Do 2 trials, even if 1st trial is successful. Do a recall after 5 minutes  FACE VELVET Hindu ROSE RED No Points    1st          2nd         ATTENTION Read list of digits (1 digit/sec) Subject has to repeat in the forward order       [  1  ]   2  1  8  5  4                                [   1 ] 7 4 2                         2 /2   Read list of letters. The subject must tap with his hand at each letter A. No points if > 2 errors.  [ 1   ] F B A C M N A A J K L B A F A K D E A A A J A M O F A A B             1 /1   Serial 7 subtraction starting at 100          [  1  ] 93         [  1  ] 86          [  1  ] 79          [  1  ] 72         [ 1   ] 65   4 or 5 correct subtractions: 3 points,  2 or 3 correct: 2 points,  1correct: 1 point,   0 correct: 0 points           3 /3   LANGUAGE Repeat: I only know that Dez is the one to help today. [  1  ]                                      The cat always hid under the couch when dogs were in the room. [ 1  ]               2/2   Fluency: Name maximum number of words in one minute that begin with the letter F                                                                                                                    [  1  ] _11__ (N > 11 words)               1/1   ABSTRACTION Similarity  between e.g. banana-orange=fruit                                                                   [   1 ] train-bicycle                      [  1  ] watch-ruler             2 /2   DELAYED  RECALL Has to recall words  WITH NO CUE FACE  [  0  ] VELVET  [  0  ] Uatsdin  [  0  ]  ROSE  [   0 ] RED  [ 0   ] Points for UNCUED recall only            0/5           OPTIONAL Category cue           Multiple choice cue          ORIENTATION  [  1  ] Date     [  1  ] Month       [  1  ] Year      [  1  ] Day      [  1  ] Place        [   1 ] City         6 /6   TOTAL  Normal > 26/30 Add 1 point if < 12 years education       25 /30

## 2024-02-14 NOTE — LETTER
2/14/2024         RE: Iraj De Leon  [address]  [address]  [place] [address]        Dear Colleague,    Thank you for referring your patient, Iraj De Leon, to the [facility] [place]. Please see a copy of my visit note below.    NEUROLOGY OUTPATIENT CONSULT NOTE  Feb 14, 2024     CHIEF COMPLAINT/REASON FOR VISIT/REASON FOR CONSULT  Patient presents with:  Memory Loss: Pt has been having more memory difficulties. Previously was being seen at [facility] in [place], pt moved to [place], so wanted something closer to home.  Pt has had MRI's done at [facility]. Notes in Media tab    REASON FOR CONSULTATION-memory difficulty    REFERRAL SOURCE  Dr. Saeed Love  CC Dr. Saeed Love    HISTORY OF PRESENT ILLNESS  Iraj De Leon is a 73 year old male seen today for evaluation of memory difficulty.  He is switching care from [facility].  He does have a history of multiple sclerosis.  Was diagnosed 27 years ago.  Has not had any symptoms from MS.  Has never taken any medications.    Cognitive difficulties started over the last couple of years.  They have been progressive.  He will sometimes have to ask the same questions.  No difficulty with understanding other people or expressing himself.  Does not drive.  Does not get lost.  Does do better in the morning compared to the evening.  Has had 2 neuropsychology evaluations a diagnosis of dementia is not clear.  MRI did show some atrophy of the right hippocampus.  Reports no hallucinations.  He does have some difficulty with sleep but this might be related to poor sleep hygiene.  Also there is some increased agitation at times though not really bothersome.  Does have decreased expressions.    No other neurological symptoms though his wife has noted that he is having some difficulty speaking at times where his voice will become more high-pitched.  There is also right leg weakness after his hip surgery on the right side.  No clear cause  has been identified.    Previous history is reviewed and this is unchanged.    PAST MEDICAL/SURGICAL HISTORY  Past Medical History:   Diagnosis Date     Arthritis      Cancer (H) 2020    basal cell Moh's forehead     Diabetes mellitus (H)     type 2, diet controlled as of 2019     Headache 2014    Due to spontaneous spinal fluid leak     Hyperlipidemia      Hypertension      Kidney stones 2019     Multiple sclerosis (H)      Osteoarthritis     bilateral hips     Pituitary microadenoma (H)      Posterior vitreous detachment      Patient Active Problem List   Diagnosis     Degenerative joint disease (DJD) of hip     Essential hypertension     Type 2 diabetes mellitus without complication, without long-term current use of insulin (H)     Screening for AAA (abdominal aortic aneurysm)     Acute right-sided low back pain without sciatica     Calcium oxalate monohydrate kidney stones     Chronic left shoulder pain     CSF leak     Diuretic-induced hypokalemia     Encephalomalacia on imaging study     Family history of stomach cancer     History of actinic keratoses     History of tobacco use     Hyperoxaluria     Mixed hyperlipidemia     Multiple sclerosis (H)     Need for hepatitis C screening test     Onychomycosis     Other seborrheic keratosis     Sleep disturbance     Snoring     Impaired cognition     Paresis of lower extremity (H)     Alzheimer's disease (H)   Significant for high blood pressure    FAMILY HISTORY  History reviewed. No pertinent family history.  Family history negative for dementia    SOCIAL HISTORY  Social History     Tobacco Use     Smoking status: Former     Packs/day: 0.00     Years: 30.00     Additional pack years: 0.00     Total pack years: 0.00     Types: Cigarettes     Quit date: 2012     Years since quittin.1     Smokeless tobacco: Never     Tobacco comments:     cigars, very occasional, maybe 1 x week   Substance Use Topics     Alcohol use: Not Currently     Drug use: No  "      SYSTEMS REVIEW  Twelve-system ROS was done and other than the HPI this was negative.  Pertinent positives on the HPI.    MEDICATIONS  aspirin 81 MG EC tablet, Take 1 tablet (81 mg) by mouth daily  chlorthalidone (HYGROTEN) 25 MG tablet, [CHLORTHALIDONE (HYGROTEN) 25 MG TABLET] Take 25 mg by mouth daily.  cholecalciferol, vitamin D3, 1,000 unit (25 mcg) tablet, [CHOLECALCIFEROL, VITAMIN D3, 1,000 UNIT (25 MCG) TABLET] Take 1,000 Units by mouth daily.  lisinopril (PRINIVIL,ZESTRIL) 20 MG tablet, [LISINOPRIL (PRINIVIL,ZESTRIL) 20 MG TABLET] Take 20 mg by mouth daily.  multivitamin therapeutic tablet, [MULTIVITAMIN THERAPEUTIC TABLET] Take 1 tablet by mouth daily.  potassium chloride ER (KLOR-CON M) 20 MEQ CR tablet, Take 1 tablet (20 mEq) by mouth 2 times daily  rosuvastatin (CRESTOR) 10 MG tablet, TAKE 1 TABLET BY MOUTH EVERYDAY AT BEDTIME  acetaminophen (TYLENOL) 500 MG tablet, [ACETAMINOPHEN (TYLENOL) 500 MG TABLET] Take 2 tablets (1,000 mg total) by mouth 3 (three) times a day. (Patient not taking: Reported on 2/14/2024)    No current facility-administered medications on file prior to visit.       PHYSICAL EXAMINATION  VITALS: /86 (BP Location: Left arm, Patient Position: Sitting)   Pulse 75   Ht 1.778 m (5' 10\")   Wt 95.7 kg (211 lb)   BMI 30.28 kg/m    GENERAL: Healthy appearing, alert, no acute distress, normal habitus.  CARDIOVASCULAR: Extremities warm and well perfused. Pulses present.   NEUROLOGICAL:  Patient is awake and oriented to self, place and time.  Attention span is normal.  Memory is grossly intact; MOCA 25.  Language is fluent and follows commands appropriately.  Appropriate fund of knowledge.  Decreased expressions cranial nerves 2-12 are intact. There is no pronator drift.  Motor exam shows 5/5 strength in all extremities except right hip flexion weakness.  Tone is symmetric bilaterally in upper and lower extremities.  Reflexes are symmetric and 2+ brisk in upper extremities and " lower extremities. Sensory exam is grossly intact to light touch, pin prick and vibration.  Finger to nose and heel to shin is without dysmetria.  Gait is antalgic due to right leg weakness.  Needs to use a walker to walk.    DIAGNOSTICS  MRI 2023      EMG      EEG-  Clinic      RELEVANT LABS  Component      Latest Ref Rng 1/18/2024  9:25 AM   Sodium      135 - 145 mmol/L 142    Potassium      3.4 - 5.3 mmol/L 3.9    Chloride      98 - 107 mmol/L 101    Carbon Dioxide (CO2)      22 - 29 mmol/L 32 (H)    Anion Gap      7 - 15 mmol/L 9    Urea Nitrogen      8.0 - 23.0 mg/dL 16.0    Creatinine      0.67 - 1.17 mg/dL 0.97    GFR Estimate      >60 mL/min/1.73m2 82    Calcium      8.8 - 10.2 mg/dL 9.9    Glucose      70 - 99 mg/dL 138 (H)    Hemoglobin A1C      0.0 - 5.6 % 7.1 (H)       Legend:  (H) High    MRI L spine  IMPRESSION:  1.  No evidence of acute lumbar spine pathology. No significant disc protrusion or extrusion or spinal canal stenosis.     2.  Multilevel mild degenerative spondylosis as described.    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  MRI brain has shown right temporal/hippocampal atrophy.  Neuropsychology is suggesting frontotemporal dementia though per previous notes Alzheimer's dementia could also be a possibility.  He is not on any cognition enhancement medications.    His MoCA Score today was 25.  Possibly he does not have dementia given the almost normal score.  His cognitive difficulty could  be multifactorial from the history of MS, stroke and insomnia.  Will set him up with a sleep specialist.  Will repeat the neuropsychology evaluation.  Will check blood work to look for causes of memory problems.    He does have some difficulty with speaking and we will set him up with speech therapy.  I do not see much on the exam today.    In terms of his right leg weakness after his hip surgery-exam mainly shows some hip flexion weakness.  He has symmetric patellar reflexes.  EMG was negative for neuropathy.  MRI of the L-spine has not shown any significant degeneration in 2019.  Recommend physical therapy.  Could consider MRI C-spine to look for other etiologies the family has decided to hold off.  Can follow back with the orthopedic doctor who did the hip surgery.    I can see them back in a year after repeat neuropsychology evaluation.  Discussed about mimickers of dementia.    -     Adult Sleep Eval & Management  Referral; Future  -     Adult Neurology  Referral  -     Speech Therapy Referral; Future  -     Adult Neuropsychology  Referral; Future  -     Vitamin B12; Future  -     TSH with free T4 reflex; Future  -     Vitamin B1 whole blood; Future    Return in about 1 year (around 2/14/2025) for In-Clinic Visit (must), After testing.    Over 60 minutes were spent coordinating the care for the patient on the day of the encounter.  This includes previsit, during visit and post visit activities as documented above.  Counseling patient and family.  Extensive testing reviewed.  Testing ordered.  Multiple problems reviewed/addressed.  (Activities include but not inclusive of reviewing chart, reviewing outside records, reviewing labs and imaging study results as well as the images, patient visit time including getting history and exam,  use if applicable, review of test results with the patient and coming up with a plan in a shared model, counseling patient and family, education  and answering patient questions, EMR , EMR diagnosis entry and problem list management, medication reconciliation and prescription management if applicable, paperwork if applicable, printing documents and documentation of the visit activities.)        Aurelio Pimentel MD  Neurologist  [facility]  Tel:- [phone]    This note was dictated using voice recognition software.  Any grammatical or context distortions are unintentional and inherent to the software.      Again, thank you for allowing me to participate in the care of your patient.        Sincerely,        Aurelio Pimentel MD

## 2024-02-14 NOTE — LETTER
February 14, 2024      Iraj De Leon  [address]  [place] [id number]        To Whom It May Concern:    Iraj De Leon was seen in our clinic.  He suffers from cognitive difficulty and possibly dementia.  Because of his cognitive difficulty he is unable to be the power of .  Please excuse him.      Sincerely,      Aurelio Pimentel

## 2024-02-14 NOTE — PROGRESS NOTES
NEUROLOGY OUTPATIENT CONSULT NOTE  Feb 14, 2024     CHIEF COMPLAINT/REASON FOR VISIT/REASON FOR CONSULT  Patient presents with:  Memory Loss: Pt has been having more memory difficulties. Previously was being seen at [facility] in [place], pt moved to [place], so wanted something closer to home.  Pt has had MRI's done at [facility]. Notes in Media tab    REASON FOR CONSULTATION-memory difficulty    REFERRAL SOURCE  Dr. Saeed Love  CC Dr. Saeed Love    HISTORY OF PRESENT ILLNESS  Iraj De Leon is a 73 year old male seen today for evaluation of memory difficulty.  He is switching care from [facility].  He does have a history of multiple sclerosis.  Was diagnosed 27 years ago.  Has not had any symptoms from MS.  Has never taken any medications.    Cognitive difficulties started over the last couple of years.  They have been progressive.  He will sometimes have to ask the same questions.  No difficulty with understanding other people or expressing himself.  Does not drive.  Does not get lost.  Does do better in the morning compared to the evening.  Has had 2 neuropsychology evaluations a diagnosis of dementia is not clear.  MRI did show some atrophy of the right hippocampus.  Reports no hallucinations.  He does have some difficulty with sleep but this might be related to poor sleep hygiene.  Also there is some increased agitation at times though not really bothersome.  Does have decreased expressions.    No other neurological symptoms though his wife has noted that he is having some difficulty speaking at times where his voice will become more high-pitched.  There is also right leg weakness after his hip surgery on the right side.  No clear cause has been identified.    Previous history is reviewed and this is unchanged.    PAST MEDICAL/SURGICAL HISTORY  Past Medical History:   Diagnosis Date    Arthritis     Cancer (H) 02/2020    basal cell Moh's forehead    Diabetes mellitus (H)     type 2, diet controlled  as of 2019    Headache 2014    Due to spontaneous spinal fluid leak    Hyperlipidemia     Hypertension     Kidney stones 2019    Multiple sclerosis (H)     Osteoarthritis     bilateral hips    Pituitary microadenoma (H)     Posterior vitreous detachment      Patient Active Problem List   Diagnosis    Degenerative joint disease (DJD) of hip    Essential hypertension    Type 2 diabetes mellitus without complication, without long-term current use of insulin (H)    Screening for AAA (abdominal aortic aneurysm)    Acute right-sided low back pain without sciatica    Calcium oxalate monohydrate kidney stones    Chronic left shoulder pain    CSF leak    Diuretic-induced hypokalemia    Encephalomalacia on imaging study    Family history of stomach cancer    History of actinic keratoses    History of tobacco use    Hyperoxaluria    Mixed hyperlipidemia    Multiple sclerosis (H)    Need for hepatitis C screening test    Onychomycosis    Other seborrheic keratosis    Sleep disturbance    Snoring    Impaired cognition    Paresis of lower extremity (H)    Alzheimer's disease (H)   Significant for high blood pressure    FAMILY HISTORY  History reviewed. No pertinent family history.  Family history negative for dementia    SOCIAL HISTORY  Social History     Tobacco Use    Smoking status: Former     Packs/day: 0.00     Years: 30.00     Additional pack years: 0.00     Total pack years: 0.00     Types: Cigarettes     Quit date: 2012     Years since quittin.1    Smokeless tobacco: Never    Tobacco comments:     cigars, very occasional, maybe 1 x week   Substance Use Topics    Alcohol use: Not Currently    Drug use: No       SYSTEMS REVIEW  Twelve-system ROS was done and other than the HPI this was negative.  Pertinent positives on the HPI.    MEDICATIONS  aspirin 81 MG EC tablet, Take 1 tablet (81 mg) by mouth daily  chlorthalidone (HYGROTEN) 25 MG tablet, [CHLORTHALIDONE (HYGROTEN) 25 MG TABLET] Take 25 mg by mouth  "daily.  cholecalciferol, vitamin D3, 1,000 unit (25 mcg) tablet, [CHOLECALCIFEROL, VITAMIN D3, 1,000 UNIT (25 MCG) TABLET] Take 1,000 Units by mouth daily.  lisinopril (PRINIVIL,ZESTRIL) 20 MG tablet, [LISINOPRIL (PRINIVIL,ZESTRIL) 20 MG TABLET] Take 20 mg by mouth daily.  multivitamin therapeutic tablet, [MULTIVITAMIN THERAPEUTIC TABLET] Take 1 tablet by mouth daily.  potassium chloride ER (KLOR-CON M) 20 MEQ CR tablet, Take 1 tablet (20 mEq) by mouth 2 times daily  rosuvastatin (CRESTOR) 10 MG tablet, TAKE 1 TABLET BY MOUTH EVERYDAY AT BEDTIME  acetaminophen (TYLENOL) 500 MG tablet, [ACETAMINOPHEN (TYLENOL) 500 MG TABLET] Take 2 tablets (1,000 mg total) by mouth 3 (three) times a day. (Patient not taking: Reported on 2/14/2024)    No current facility-administered medications on file prior to visit.       PHYSICAL EXAMINATION  VITALS: /86 (BP Location: Left arm, Patient Position: Sitting)   Pulse 75   Ht 1.778 m (5' 10\")   Wt 95.7 kg (211 lb)   BMI 30.28 kg/m    GENERAL: Healthy appearing, alert, no acute distress, normal habitus.  CARDIOVASCULAR: Extremities warm and well perfused. Pulses present.   NEUROLOGICAL:  Patient is awake and oriented to self, place and time.  Attention span is normal.  Memory is grossly intact; MOCA 25.  Language is fluent and follows commands appropriately.  Appropriate fund of knowledge.  Decreased expressions cranial nerves 2-12 are intact. There is no pronator drift.  Motor exam shows 5/5 strength in all extremities except right hip flexion weakness.  Tone is symmetric bilaterally in upper and lower extremities.  Reflexes are symmetric and 2+ brisk in upper extremities and lower extremities. Sensory exam is grossly intact to light touch, pin prick and vibration.  Finger to nose and heel to shin is without dysmetria.  Gait is antalgic due to right leg weakness.  Needs to use a walker to walk.    DIAGNOSTICS  MRI 2023      EMG      EEG-  Clinic      RELEVANT LABS  Component   "    Latest Ref Rng 1/18/2024  9:25 AM   Sodium      135 - 145 mmol/L 142    Potassium      3.4 - 5.3 mmol/L 3.9    Chloride      98 - 107 mmol/L 101    Carbon Dioxide (CO2)      22 - 29 mmol/L 32 (H)    Anion Gap      7 - 15 mmol/L 9    Urea Nitrogen      8.0 - 23.0 mg/dL 16.0    Creatinine      0.67 - 1.17 mg/dL 0.97    GFR Estimate      >60 mL/min/1.73m2 82    Calcium      8.8 - 10.2 mg/dL 9.9    Glucose      70 - 99 mg/dL 138 (H)    Hemoglobin A1C      0.0 - 5.6 % 7.1 (H)       Legend:  (H) High    MRI L spine  IMPRESSION:  1.  No evidence of acute lumbar spine pathology. No significant disc protrusion or extrusion or spinal canal stenosis.     2.  Multilevel mild degenerative spondylosis as described.    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  MRI brain has shown right temporal/hippocampal atrophy.  Neuropsychology is suggesting frontotemporal dementia though per previous notes Alzheimer's dementia could also be a possibility.  He is not on any cognition enhancement medications.    His MoCA Score today was 25.  Possibly he does not have dementia given the almost normal score.  His cognitive difficulty could be multifactorial from the history of MS, stroke and insomnia.  Will set him up with a sleep specialist.  Will repeat the neuropsychology evaluation.  Will check blood work to look for causes of memory problems.    He does have some difficulty with speaking and we will set him up with speech therapy.  I do  not see much on the exam today.    In terms of his right leg weakness after his hip surgery-exam mainly shows some hip flexion weakness.  He has symmetric patellar reflexes.  EMG was negative for neuropathy.  MRI of the L-spine has not shown any significant degeneration in 2019.  Recommend physical therapy.  Could consider MRI C-spine to look for other etiologies the family has decided to hold off.  Can follow back with the orthopedic doctor who did the hip surgery.    I can see them back in a year after repeat neuropsychology evaluation.  Discussed about mimickers of dementia.    -     Adult Sleep Eval & Management  Referral; Future  -     Adult Neurology  Referral  -     Speech Therapy Referral; Future  -     Adult Neuropsychology  Referral; Future  -     Vitamin B12; Future  -     TSH with free T4 reflex; Future  -     Vitamin B1 whole blood; Future    Return in about 1 year (around 2/14/2025) for In-Clinic Visit (must), After testing.    Over 60 minutes were spent coordinating the care for the patient on the day of the encounter.  This includes previsit, during visit and post visit activities as documented above.  Counseling patient and family.  Extensive testing reviewed.  Testing ordered.  Multiple problems reviewed/addressed.  (Activities include but not inclusive of reviewing chart, reviewing outside records, reviewing labs and imaging study results as well as the images, patient visit time including getting history and exam,  use if applicable, review of test results with the patient and coming up with a plan in a shared model, counseling patient and family, education and answering patient questions, EMR , EMR diagnosis entry and problem list management, medication reconciliation and prescription management if applicable, paperwork if applicable, printing documents and documentation of the visit activities.)        Aurelio Pimentel MD  Neurologist  [facility]  [facility]  Tel:- [phone]    This note was dictated using voice recognition software.  Any grammatical or context distortions are unintentional and inherent to the software.

## 2024-02-14 NOTE — NURSING NOTE
Chief Complaint   Patient presents with    Memory Loss     Pt has been having more memory difficulties. Previously was being seen at [facility] in [place], pt moved to [place], so wanted something closer to home.  Pt has had MRI's done at [facility]. Notes in Media tab         Mady Craig LPN on 2/14/2024 at 10:00 AM

## 2024-02-15 ENCOUNTER — TELEPHONE (OUTPATIENT)
Dept: NEUROPSYCHOLOGY | Facility: CLINIC | Age: 74
End: 2024-02-15
Payer: MEDICARE

## 2024-02-15 NOTE — TELEPHONE ENCOUNTER
Triage instructions routed to clinical scheduling team:     Schedule per protocol    Timeline: First Available  Location: ANY LOCATION  Preferred Provider: Rosi  If Preferred Provider is not available: ANY PROVIDER  Visit Type: NEUROPSYCH EVAL  Special Instructions:

## 2024-02-15 NOTE — TELEPHONE ENCOUNTER
Spoke with wife Jeniffer. Scheduled on 1/23/2025 with Dr. Aranda in [facility]. Added to wait list for [facility] providers per her request.    Rashi Amaral on 2/15/2024 at 3:08 PM

## 2024-02-20 ENCOUNTER — LAB (OUTPATIENT)
Dept: LAB | Facility: CLINIC | Age: 74
End: 2024-02-20
Payer: MEDICARE

## 2024-02-20 DIAGNOSIS — R41.9 COGNITIVE COMPLAINTS: ICD-10-CM

## 2024-02-20 DIAGNOSIS — F09 COGNITIVE DYSFUNCTION: ICD-10-CM

## 2024-02-20 LAB
TSH SERPL DL<=0.005 MIU/L-ACNC: 1.17 UIU/ML (ref 0.3–4.2)
VIT B12 SERPL-MCNC: 541 PG/ML (ref 232–1245)

## 2024-02-20 PROCEDURE — 82607 VITAMIN B-12: CPT

## 2024-02-20 PROCEDURE — 36415 COLL VENOUS BLD VENIPUNCTURE: CPT

## 2024-02-20 PROCEDURE — 84425 ASSAY OF VITAMIN B-1: CPT | Mod: 90

## 2024-02-20 PROCEDURE — 84443 ASSAY THYROID STIM HORMONE: CPT

## 2024-02-23 LAB — VIT B1 PYROPHOSHATE BLD-SCNC: 181 NMOL/L

## 2024-02-29 ENCOUNTER — TRANSFERRED RECORDS (OUTPATIENT)
Dept: HEALTH INFORMATION MANAGEMENT | Facility: CLINIC | Age: 74
End: 2024-02-29
Payer: MEDICARE

## 2024-03-07 ENCOUNTER — THERAPY VISIT (OUTPATIENT)
Dept: SPEECH THERAPY | Facility: REHABILITATION | Age: 74
End: 2024-03-07
Attending: PSYCHIATRY & NEUROLOGY
Payer: MEDICARE

## 2024-03-07 DIAGNOSIS — F09 COGNITIVE DYSFUNCTION: ICD-10-CM

## 2024-03-07 DIAGNOSIS — R41.841 COGNITIVE COMMUNICATION DEFICIT: Primary | ICD-10-CM

## 2024-03-07 PROCEDURE — 92523 SPEECH SOUND LANG COMPREHEN: CPT | Mod: GN

## 2024-03-10 NOTE — PROGRESS NOTES
"SPEECH LANGUAGE PATHOLOGY EVALUATION    See electronic medical record for Abuse and Falls Screening details.    Subjective      Presenting condition or subjective complaint: Cognitive Dysfunction  Mr. De Leon is a 73 year old man who presented to Outpatient Speech Language Pathology with concerns regarding verbal and visual memory as well as object naming skills. He shared that these difficulties began a couple of years ago, additionally he has a history of MS. He has had multiple neuropsychology evaluations with unclear diagnosis. Per his neurologist \"MRI did show some atrophy of the right hippocampus.\" His wife also endorsed concerns regarding Mr. De Leon's voice such as pitch changes when he attempt to speak louder (higher pitch) and low volume. Mr. De Leon did not share her concerns in these areas.   Date of onset: 03/05/24    Relevant medical history: Hearing problems; Multiple Sclerosis ; stroke; insomnia;   Currently being followed by neurology, possible diagnoses of frontotemporal dementia or Alzheimer's dementia were noted as possibilities per Dr. Pimentel on 2/14/24.   Dates & types of surgery: Hip surgery twice    Prior diagnostic imaging/testing results:       MoCa score on 2/14/24 was 25    MRI completed on 01/06/2023 see imaging for details  MRI completed on 10/31/2022 see imaging for details  MRI completed 6/4/2019 see imaging for details    Prior therapy history for the same diagnosis, illness or injury: No      Living Environment  Social support: With a significant other or spouse   Help at home: Other  Equipment owned: Straight Cane; Walker; Walker with wheels; Grab bars     Employment: No    Hobbies/Interests: Puzzles    Patient goals for therapy: From his caregiver- he gets more sleep than he realizes. He plays game on computer at night. He naps and doesnt realize it.  He will not wear a cpap if needed nor do a sleep study test over nite by himself    Pain assessment:  Pain was not reported   "   Objective     COGNITIVE STATUS  Attention: intact   Short term memory: impaired   Long term memory: impaired   Cognition level of impairment: mild to moderate impairment  Additional cognitive evaluation:  RBANS below    Repeatable Battery for the Assessment of Neuropsychological Status Update   (RBANS  Update)  The Repeatable Battery for the Assessment of Neuropsychological Status Update (RBANS  Update) was administered to Iraj De Leon on 3/7/2024.  The RBANS Update was originally developed with a primary focus on assessment of dementia, and measures cognitive decline or improvement across these domains: immediate memory, visuospatial/constructional; language; attention; and delayed memory.  However, special group studies are available for Alzheimer's Disease, Vascular Dementia, HIV Dementia, [place]'s Disease, Parkinson's Disease, Depression, Schizophrenia, and Closed Head Injury.   The RBANS can be used by clinicians and neuropsychologists to help screen for deficits in acute-care settings; track recovery during rehabilitation; track progression of neurological disorders; and screen for neurocognitive status in adolescents.  This test is normed for ages 12-89.  The subtest normative data are presented in scaled score units that have a mean of 10 and a standard deviation of 3.          Total Score Scaled Score  Percentile Group       I.  Immediate memory    1.  List Learning   21  7   2.  Story Memory   10  5  Immediate Memory Index Score  = 78    II.  Visuospatial/Constructional    3.  Figure copy   19  12  4.  Line Orientation   16     26-50  Visuospatial/Constructional Index Score  = 102    III.  Language     5.  Picture Naming   9     26-50  6.  Semantic Fluency   7  2  Language Index Score = 78    IV.  Attention  7.  Digit Span     12  12  8.  Coding     33  8  Attention Index Score = 100    V.  Delayed Memory  9.  List recall    0     <2  10. List Recognition   13     <2  11. Story Recall   0  1  12.  Figure Recall   2  3  Delayed Memory Index Score = 44  Sum of Total Scores for Subtest 9+11+12 2    Sum of Index Scores = 402  Total Scale Score = 75      INTERPRETATION OF TEST RESULTS: Mr. De Leon received a Total Scale Score of 75 on the RBANS, which is at the 5th% or in the borderline range. He demonstrated personal strengths in the areas of Visuospatial/Constructional and Attention. He demonstrated weaknesses in the areas of Immediate Memory, Language and Delayed Memory, with his delayed memory skills in the extremely low range. Though, he scored in the average range on the Picture Naming subtest it was noted that he often demonstrated a 5-10 second delay before providing his answer. Subtests that Mr. De Leon scored in the extremely low range include semantic fluency and all of the delayed memory subtests. These results indicate the need for skilled intervention to improve immediate memory, language and delayed memory skills.    TIME ADMINISTERING TEST: 30  TIME FOR INTERPRETATION AND PREPARATION OF REPORT: 20  TOTAL TIME: 50    Repeatable Battery for the Assessment of Neuropsychological Status Update (RBANS Update). Copyright   2012 [facility] formerly [facility] Adapted and reproduced with permission. All rights reserved.      MOTOR SPEECH:  Speech Intelligibility:     Word level speech intelligibility: intact      Phrase/sentence level speech intelligibility: intact      Conversation level speech intelligibility: intact   Respiration Observations: WNL   Phonation: reduced loudness   Maximum phonation time (seconds): 14.5 at 65 dB (15-62 seconds is considered the critical region for adult males)  Resonance: WNL  Rate/prosody: monotone speech, able to change prosody when prompted    Articulation: WNL   Repetition Skills:  intact  Speech Fluency: WNL   Dysarthria differential diagnosis: WNL, voice slightly hoarse and quiet, did not observe reported pitch changes during this evaluation    Motor speech level of impairment: no  impairment, further assessment may be warranted as all tasks were not completed due to time constraints      Assessment & Plan   CLINICAL IMPRESSIONS   Medical Diagnosis: F09 (ICD-10-CM) - Cognitive dysfunction    Treatment Diagnosis: Cognitive communication deficit (R41.841)   Impression/Assessment: Pt is a 73 year old male with memory complaints. The following significant findings have been identified: impaired cognition, characterized by poor delayed memory skills and borderline immediate memory and Language skills. Speech and Voice were not fully assessed due to time constraints and further assessment may be considered if concerns continue. Identified deficits interfere with their ability to communicate within the home or community, function independently, and remember information  as compared to previous level of function. Skilled speech language pathology intervention is recommended at a frequency of 1x e/o week for 12 weeks in order to improve functional abilities in identified areas of deficit.     PLAN OF CARE  Treatment Interventions: Language , Cognitive skills    Prognosis to achieve stated therapy goals is fair   Rehab potential is impacted by: current level of function, family/caregiver support, patient awareness of deficits    Long Term Goals:   SLP Goal 1  Goal Identifier: STG 1: Verbal Expression  Goal Description: Pt will complete complex verbal expression tasks (i.e., generative naming, describing, synonyms and opposites, etc.) with 90% accuracy when provided with minimal cues across 2 consecutive sessions.  Rationale: To maximize functional communication within the home or community  Target Date: 06/04/24  SLP Goal 2  Goal Identifier: STG 2: Memory  Goal Description: Pt will complete moderate level immediate and delayed memory tasks with 80% accuracy when provided with moderate cues/using memory strategies across 2 consecutive sessions.  Rationale: To maximize functional communication within the  home or community  Target Date: 06/04/24  SLP Goal 3  Goal Identifier: STG 3: Memory Strategies  Goal Description: Pt will use memory strategy of choice (i.e., chaining and chunking, visualization, writing key information, etc.) with 90% accuracy during structured activities when provided with minimal cues across 2 consecutive sessions.  Rationale: To maximize functional communication within the home or community  Target Date: 06/04/24      Frequency of Treatment: 1x e/o week  Duration of Treatment: 12 weeks     Recommended Referrals to Other Professionals:  none at this time  Education Assessment:   Learner/Method: Patient;Family;Significant Other;Listening;Demonstration  Education Comments: Discussed results of the evaluation, recommended goals for treatment, expected frequency and duration of treatment and role of SLP    Risks and benefits of evaluation/treatment have been explained.   Patient/Family/caregiver agrees with Plan of Care.     Evaluation Time:    Sound production with lang comprehension and expression minutes (25885): 45       It was a pleasure to meet you and your family today. If you have questions regarding this evaluation report please reach out at [email or web address]. Thank you!    Kalie Peraza MS, CCC-SLP  Speech Language Pathologist      Signing Clinician: KALIE PERAZA, SLP    [facility]                                                                                   OUTPATIENT SPEECH LANGUAGE PATHOLOGY      PLAN OF TREATMENT FOR OUTPATIENT REHABILITATION   Patient's Last Name, First Name, Iraj Cristina YOB: 1950   Provider's Name   [facility]   Medical Record No.  [id number]     Onset Date: 03/05/24 Start of Care Date: 03/07/24     Medical Diagnosis:  F09 (ICD-10-CM) - Cognitive dysfunction      SLP Treatment Diagnosis: Cognitive communication deficit (R41.841)  Plan of Treatment  Frequency/Duration: 1x e/o  week  / 12 weeks     Certification date from 03/07/24   To 06/04/24          See note for plan of treatment details and functional goals     RADHA PERAZA, SLP                         I CERTIFY THE NEED FOR THESE SERVICES FURNISHED UNDER        THIS PLAN OF TREATMENT AND WHILE UNDER MY CARE     (Physician attestation of this document indicates review and certification of the therapy plan).              Referring Provider:  Aurelio Pimentel    Initial Assessment  See Epic Evaluation- 03/07/24

## 2024-04-02 ENCOUNTER — TRANSFERRED RECORDS (OUTPATIENT)
Dept: HEALTH INFORMATION MANAGEMENT | Facility: CLINIC | Age: 74
End: 2024-04-02
Payer: MEDICARE

## 2024-04-08 ENCOUNTER — TRANSFERRED RECORDS (OUTPATIENT)
Dept: HEALTH INFORMATION MANAGEMENT | Facility: CLINIC | Age: 74
End: 2024-04-08

## 2024-04-18 ENCOUNTER — OFFICE VISIT (OUTPATIENT)
Dept: FAMILY MEDICINE | Facility: CLINIC | Age: 74
End: 2024-04-18
Payer: MEDICARE

## 2024-04-18 ENCOUNTER — TELEPHONE (OUTPATIENT)
Dept: SLEEP MEDICINE | Facility: CLINIC | Age: 74
End: 2024-04-18

## 2024-04-18 VITALS
SYSTOLIC BLOOD PRESSURE: 132 MMHG | RESPIRATION RATE: 16 BRPM | BODY MASS INDEX: 30.54 KG/M2 | DIASTOLIC BLOOD PRESSURE: 80 MMHG | WEIGHT: 213.3 LBS | OXYGEN SATURATION: 93 % | TEMPERATURE: 97.7 F | HEART RATE: 85 BPM | HEIGHT: 70 IN

## 2024-04-18 DIAGNOSIS — F41.9 ANXIETY: Primary | ICD-10-CM

## 2024-04-18 DIAGNOSIS — G30.9 ALZHEIMER'S DISEASE (H): ICD-10-CM

## 2024-04-18 DIAGNOSIS — G47.9 SLEEP DISTURBANCE: ICD-10-CM

## 2024-04-18 DIAGNOSIS — N39.41 URGE INCONTINENCE OF URINE: ICD-10-CM

## 2024-04-18 DIAGNOSIS — E11.9 TYPE 2 DIABETES MELLITUS WITHOUT COMPLICATION, WITHOUT LONG-TERM CURRENT USE OF INSULIN (H): ICD-10-CM

## 2024-04-18 DIAGNOSIS — G83.10 PARESIS OF LOWER EXTREMITY (H): ICD-10-CM

## 2024-04-18 DIAGNOSIS — F02.80 ALZHEIMER'S DISEASE (H): ICD-10-CM

## 2024-04-18 PROCEDURE — 99214 OFFICE O/P EST MOD 30 MIN: CPT | Performed by: FAMILY MEDICINE

## 2024-04-18 RX ORDER — ESCITALOPRAM OXALATE 10 MG/1
10 TABLET ORAL DAILY
Qty: 90 TABLET | Refills: 1 | Status: SHIPPED | OUTPATIENT
Start: 2024-04-18 | End: 2024-09-23

## 2024-04-18 ASSESSMENT — PAIN SCALES - GENERAL: PAINLEVEL: NO PAIN (0)

## 2024-04-18 NOTE — PROGRESS NOTES
"  Assessment & Plan     (N39.41) Urge incontinence of urine  Has hard time getting to rest room on time when he needs to go.  Discussed bladder routine particularly after taking hydrochlorothiazide in the am.          (F41.9) Anxiety  (primary encounter diagnosis)  Comment: recommend starting lexepro and selfrelation  Plan: escitalopram (LEXAPRO) 10 MG tablet           (E11.9) Type 2 diabetes mellitus without complication, without long-term current use of insulin (H) Good control.  Continue currant medications, continue to monitor.        (G30.9,  F02.80) Alzheimer's disease (H)  He tests pretty well on MOCA.  But he has context issues wite time, situation,  repeatrs questions a lot.  Again hopefully  better control of anxiety may help.  Discussed memory board.   Hopefully with anxiety better controlled this and sleep will be better    (G47.9) Sleep disturbance  If getting worse may need sleep aid, which I am trying to avoid      Anxiety    - escitalopram (LEXAPRO) 10 MG tablet; Take 1 tablet (10 mg) by mouth daily        BMI  Estimated body mass index is 30.61 kg/m  as calculated from the following:    Height as of this encounter: 1.778 m (5' 10\").    Weight as of this encounter: 96.8 kg (213 lb 4.8 oz).   Weight management plan: Discussed healthy diet and exercise guidelines          Flor Galo is a 73 year old, presenting for the following health issues:  Follow Up        4/18/2024     9:35 AM   Additional Questions   Roomed by Sully Acosta CMA     -He is here to follow up from last visit on 01/18/2024.    -He was wanting to discuss urinating. Has had troubles off and on.      History of Present Illness       Reason for visit:  Follow up    He eats 2-3 servings of fruits and vegetables daily.He consumes 0 sweetened beverage(s) daily.He exercises with enough effort to increase his heart rate 9 or less minutes per day.  He exercises with enough effort to increase his heart rate 3 or less days per week. " "  He is taking medications regularly.       Review of Systems  Constitutional, HEENT, cardiovascular, pulmonary, gi and gu systems are negative, except as otherwise noted.      Objective    /80   Pulse 85   Temp 97.7  F (36.5  C) (Tympanic)   Resp 16   Ht 1.778 m (5' 10\")   Wt 96.8 kg (213 lb 4.8 oz)   SpO2 93%   BMI 30.61 kg/m    Body mass index is 30.61 kg/m .  Physical Exam   GENERAL: alert and no distress  NECK: no adenopathy, no asymmetry, masses, or scars  RESP: lungs clear to auscultation - no rales, rhonchi or wheezes  CV: regular rate and rhythm, normal S1 S2, no S3 or S4, no murmur, click or rub, no peripheral edema  ABDOMEN: soft, nontender, no hepatosplenomegaly, no masses and bowel sounds normal  MS: no gross musculoskeletal defects noted, no edema            Signed Electronically by: Dez Sinclair MD    "

## 2024-04-23 DIAGNOSIS — I10 ESSENTIAL HYPERTENSION: Primary | ICD-10-CM

## 2024-04-24 RX ORDER — LISINOPRIL 20 MG/1
20 TABLET ORAL DAILY
Qty: 90 TABLET | Refills: 1 | Status: SHIPPED | OUTPATIENT
Start: 2024-04-24 | End: 2024-07-16

## 2024-05-16 ENCOUNTER — OFFICE VISIT (OUTPATIENT)
Dept: FAMILY MEDICINE | Facility: CLINIC | Age: 74
End: 2024-05-16
Payer: MEDICARE

## 2024-05-16 VITALS
DIASTOLIC BLOOD PRESSURE: 82 MMHG | RESPIRATION RATE: 14 BRPM | HEIGHT: 70 IN | WEIGHT: 215 LBS | BODY MASS INDEX: 30.78 KG/M2 | TEMPERATURE: 97.7 F | SYSTOLIC BLOOD PRESSURE: 136 MMHG | HEART RATE: 72 BPM | OXYGEN SATURATION: 94 %

## 2024-05-16 DIAGNOSIS — E11.9 TYPE 2 DIABETES MELLITUS WITHOUT COMPLICATION, WITHOUT LONG-TERM CURRENT USE OF INSULIN (H): Primary | ICD-10-CM

## 2024-05-16 DIAGNOSIS — Z11.59 NEED FOR HEPATITIS C SCREENING TEST: ICD-10-CM

## 2024-05-16 DIAGNOSIS — F41.9 ANXIETY: ICD-10-CM

## 2024-05-16 LAB — HBA1C MFR BLD: 7.3 % (ref 0–5.6)

## 2024-05-16 PROCEDURE — 83036 HEMOGLOBIN GLYCOSYLATED A1C: CPT | Performed by: FAMILY MEDICINE

## 2024-05-16 PROCEDURE — 86803 HEPATITIS C AB TEST: CPT | Performed by: FAMILY MEDICINE

## 2024-05-16 PROCEDURE — 36415 COLL VENOUS BLD VENIPUNCTURE: CPT | Performed by: FAMILY MEDICINE

## 2024-05-16 PROCEDURE — 99213 OFFICE O/P EST LOW 20 MIN: CPT | Performed by: FAMILY MEDICINE

## 2024-05-16 ASSESSMENT — PATIENT HEALTH QUESTIONNAIRE - PHQ9
SUM OF ALL RESPONSES TO PHQ QUESTIONS 1-9: 3
5. POOR APPETITE OR OVEREATING: NOT AT ALL

## 2024-05-16 ASSESSMENT — ANXIETY QUESTIONNAIRES
7. FEELING AFRAID AS IF SOMETHING AWFUL MIGHT HAPPEN: NOT AT ALL
5. BEING SO RESTLESS THAT IT IS HARD TO SIT STILL: NOT AT ALL
GAD7 TOTAL SCORE: 0
3. WORRYING TOO MUCH ABOUT DIFFERENT THINGS: NOT AT ALL
2. NOT BEING ABLE TO STOP OR CONTROL WORRYING: NOT AT ALL
1. FEELING NERVOUS, ANXIOUS, OR ON EDGE: NOT AT ALL
6. BECOMING EASILY ANNOYED OR IRRITABLE: NOT AT ALL
GAD7 TOTAL SCORE: 0

## 2024-05-16 ASSESSMENT — PAIN SCALES - GENERAL: PAINLEVEL: NO PAIN (0)

## 2024-05-16 NOTE — PROGRESS NOTES
Assessment & Plan       Anxiety  Improved  continue lexapro 10 mg.     Type 2 diabetes mellitus without complication, without long-term current use of insulin (H)  Good control.  Continue currant medications, continue to monitor.    - Hemoglobin A1c; Future        Subjective   Iraj is a 73 year old, presenting for the following health issues:    Comes in for f/unit(s) of anxiety  had started lexaproand is doing much better.  No side effects. Less anxious     Recheck Medication (/)        2024     8:30 AM   Additional Questions   Roomed by Sully Acosta CMA     History of Present Illness       Diabetes:   He presents for follow up of diabetes.    He is not checking blood glucose.         He has no concerns regarding his diabetes at this time.  He is having weight gain.            He eats 0-1 servings of fruits and vegetables daily.He consumes 0 sweetened beverage(s) daily.He exercises with enough effort to increase his heart rate 9 or less minutes per day.  He exercises with enough effort to increase his heart rate 3 or less days per week.   He is taking medications regularly.         Anxiety   How are you doing with your anxiety since your last visit? Improved wife says she has not noticed his difficulty speaking since being on the medication  Are you having other symptoms that might be associated with anxiety? No, he has been eating more  Have you had a significant life event? No   Are you feeling depressed? No  Do you have any concerns with your use of alcohol or other drugs? No    Social History     Tobacco Use    Smoking status: Former     Current packs/day: 0.00     Types: Cigarettes     Quit date: 1982     Years since quittin.4    Smokeless tobacco: Never    Tobacco comments:     cigars, very occasional, maybe 1 x week   Substance Use Topics    Alcohol use: Not Currently    Drug use: No         2024     9:10 AM   CHARLIE-7 SCORE   Total Score 0         2024     9:10 AM   PHQ   PHQ-9  "Total Score 3   Q9: Thoughts of better off dead/self-harm past 2 weeks Not at all         5/16/2024     9:10 AM   Last PHQ-9   1.  Little interest or pleasure in doing things 0   2.  Feeling down, depressed, or hopeless 0   3.  Trouble falling or staying asleep, or sleeping too much 3   4.  Feeling tired or having little energy 0   5.  Poor appetite or overeating 0   6.  Feeling bad about yourself 0   7.  Trouble concentrating 0   8.  Moving slowly or restless 0   Q9: Thoughts of better off dead/self-harm past 2 weeks 0   PHQ-9 Total Score 3         5/16/2024     9:10 AM   CHARLIE-7    1. Feeling nervous, anxious, or on edge 0   2. Not being able to stop or control worrying 0   3. Worrying too much about different things 0   4. Trouble relaxing 0   5. Being so restless that it is hard to sit still 0   6. Becoming easily annoyed or irritable 0   7. Feeling afraid, as if something awful might happen 0   CHARLIE-7 Total Score 0       Review of Systems  Constitutional, HEENT, cardiovascular, pulmonary, gi and gu systems are negative, except as otherwise noted.      Objective    /82   Pulse 72   Temp 97.7  F (36.5  C) (Tympanic)   Resp 14   Ht 1.778 m (5' 10\")   Wt 97.5 kg (215 lb)   SpO2 94%   BMI 30.85 kg/m    Body mass index is 30.85 kg/m .  Physical Exam   GENERAL: alert and no distress  NECK: no adenopathy, no asymmetry, masses, or scars  RESP: lungs clear to auscultation - no rales, rhonchi or wheezes  CV: regular rate and rhythm, normal S1 S2, no S3 or S4, no murmur, click or rub, no peripheral edema  ABDOMEN: soft, nontender, no hepatosplenomegaly, no masses and bowel sounds normal  MS: no gross musculoskeletal defects noted, no edema            Signed Electronically by: Dez Sinclair MD    "

## 2024-05-17 LAB — HCV AB SERPL QL IA: NONREACTIVE

## 2024-06-10 NOTE — PROGRESS NOTES
Speech Language Pathology Discharge Note    DISCHARGE  Reason for Discharge: Patient has failed to schedule further appointments.    Equipment Issued: n/a    Discharge Plan: Please contact primary care provider for new orders for re-evaluation if interested in attending outpatient speech-language intervention with [facility] in the future.      Referring Provider:  Aurelio Pimentel

## 2024-07-04 ENCOUNTER — NURSE TRIAGE (OUTPATIENT)
Dept: NURSING | Facility: CLINIC | Age: 74
End: 2024-07-04
Payer: MEDICARE

## 2024-07-04 NOTE — TELEPHONE ENCOUNTER
Nurse Triage SBAR    Consent: Consent to Communicate on file    Situation: Spouse calling for  with swollen elbow.    Background: Spouse reports she noticed a large swollen bump on husbands elbow this AM when he was dressing.  Doesn't seem to be painful. Reports he has dementia and she isnt certain if he would report pain but there is no guarding or change in use of arm.  Notes that the swollen bump is red and the redness is spreading down his arm towards his wrist. Redness feels warm to the touch. She notes that he does fall occasionally but is private about falling and she hasn't witnessed any falls recently.  Reports a small scrape near the swelling.     Assessment: Red, swollen lump on left elbow, redness spreading from elbow towards wrist    Protocol Recommended Disposition: See HCP Within 4 Hours (Or PCP Triage)    Recommendation: Advised patient to go to Urgent Care. Reviewed concerning symptoms and when to call back.     2LT or FYI: Paged to Provider on-call        Reason for page: swollen left elbow    Page sent to Dr. Hurtado by RN at 1:10p.       Provider, Dr. Hurtado, returning page to [facility] at 1:12p.    Provider recommended plan of care:   Go to .    Follow-Up: Reached patient/caregiver. Informed of providers recommendations. Patient verbalized understanding and agrees with the plan.     ADS: No     Liza Kiser RN [facility] 7/4/2024 1:22 PM    Reason for Disposition   [1] Looks infected (spreading redness, pus) AND [2] large red area (> 2 in. or 5 cm)    Additional Information   Negative: [1] Elbow pain AND [2] fever   Negative: [1] Elbow redness AND [2] fever   Negative: [1] SEVERE pain (e.g., excruciating, unable to use hand or wrist at all) AND [2] not improved after 2 hours of pain medicine    Protocols used: Elbow Swelling-A-AH

## 2024-07-12 DIAGNOSIS — E78.5 HYPERLIPIDEMIA LDL GOAL <100: ICD-10-CM

## 2024-07-12 RX ORDER — ROSUVASTATIN CALCIUM 10 MG/1
10 TABLET, COATED ORAL AT BEDTIME
Qty: 90 TABLET | Refills: 3 | Status: SHIPPED | OUTPATIENT
Start: 2024-07-12

## 2024-07-13 DIAGNOSIS — T50.2X5A DIURETIC-INDUCED HYPOKALEMIA: ICD-10-CM

## 2024-07-13 DIAGNOSIS — E87.6 DIURETIC-INDUCED HYPOKALEMIA: ICD-10-CM

## 2024-07-15 RX ORDER — POTASSIUM CHLORIDE 1500 MG/1
20 TABLET, EXTENDED RELEASE ORAL 2 TIMES DAILY
Qty: 180 TABLET | Refills: 2 | Status: SHIPPED | OUTPATIENT
Start: 2024-07-15

## 2024-07-16 DIAGNOSIS — I10 ESSENTIAL HYPERTENSION: ICD-10-CM

## 2024-07-16 RX ORDER — LISINOPRIL 20 MG/1
20 TABLET ORAL DAILY
Qty: 90 TABLET | Refills: 2 | Status: SHIPPED | OUTPATIENT
Start: 2024-07-16

## 2024-08-08 ENCOUNTER — OFFICE VISIT (OUTPATIENT)
Dept: NEUROLOGY | Facility: CLINIC | Age: 74
End: 2024-08-08
Payer: MEDICARE

## 2024-08-08 VITALS
DIASTOLIC BLOOD PRESSURE: 85 MMHG | HEART RATE: 69 BPM | BODY MASS INDEX: 31.14 KG/M2 | WEIGHT: 217 LBS | SYSTOLIC BLOOD PRESSURE: 137 MMHG

## 2024-08-08 DIAGNOSIS — F09 COGNITIVE DYSFUNCTION: Primary | ICD-10-CM

## 2024-08-08 PROCEDURE — 99215 OFFICE O/P EST HI 40 MIN: CPT | Performed by: PSYCHIATRY & NEUROLOGY

## 2024-08-08 NOTE — LETTER
8/8/2024      Iraj De Leon  [address]  [address] [address]      Dear Colleague,    Thank you for referring your patient, Iraj De Leon, to the [facility] [place]. Please see a copy of my visit note below.    INITIAL NEUROLOGY CONSULTATION - Transfer of Care    DATE OF VISIT: 8/8/2024  MRN: [id number]  PATIENT NAME: Iraj De Leon  YOB: 1950    REFERRING PROVIDER: No ref. provider found    No chief complaint on file.      SUBJECTIVE:                                                      HPI:   Iraj De Leon is a 73 year old male here as transfer of care from Dr. Pimentel.  He followed through [facility] previously.  He saw Dr. Pimentel in February for concerns about cognitive changes.  Iraj has additional history of MS, not on any disease modifying therapies.    He reports 2 years of memory difficulties.  When he saw Dr. Ireland, there was also some concern about some speech changes so he was referred for SLP.  MoCA score was 25 6 months ago.  Additional history of stroke and problems with sleep.  He was referred for neuropsych testing, which is pending in January.  His wife has some concerns about getting him there because she is not comfortable driving in [place].  He had previous neuropsych testing completed in July 2022.  This showed mild cognitive dysfunction.  His speech has improved since starting Lexapro.  His wife says that he has trouble with remembering people. He has forgotten past events.  He does puzzles at home. Not much else, per him.    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    Dr. Ireland checked his B1 which was only slightly high, TSH which was normal and B12 which was 541.    Previous MRI showed Right hippocampal atrophy. Driving evaluation completed in 9.2023: No driving recommended.     Past Medical History:   Diagnosis Date     Arthritis      Cancer (H) 02/2020    basal cell Moh's forehead      Diabetes mellitus (H)     type 2, diet controlled as of March 2019     Headache 2014    Due to spontaneous spinal fluid leak     Hyperlipidemia      Hypertension      Kidney stones 2019     Multiple sclerosis (H)      Osteoarthritis     bilateral hips     Pituitary microadenoma (H)      Posterior vitreous detachment      Past Surgical History:   Procedure Laterality Date     BASAL CELL CARCINOMA EXCISION  02/20/2020    Moh's procedure to forehead     CHOLECYSTECTOMY       COLONOSCOPY       EYE SURGERY Right     cataract     JOINT REPLACEMENT  10/2019    RTHA     LITHOTRIPSY  2019     OTHER SURGICAL HISTORY  2009    lipoma removalforehead     OTHER SURGICAL HISTORY      spinal fluid leak     [place] CYSTO/URETERO W/LITHOTRIPSY &INDWELL STENT INSRT Right 3/27/2019    Procedure: CYSTOSCOPY RIGHT RETROGRADE PYELOGRAM, RIGHT URETEROSCOPY WITH LASER  LITHOTRIPSY STONE EXTRACTION AND RIGHT STENT EXCHANGE;  Surgeon: Zia Coyle MD;  Location: [facility] Main OR;  Service: Urology     [facility] TOTAL HIP ARTHROPLASTY Right 10/21/2019    Procedure: RIGHT TOTAL HIP ARTHROPLASTY;  Surgeon: Conrado Gomez MD;  Location: [facility] OR;  Service: Orthopedics     [facility] TOTAL HIP ARTHROPLASTY Left 3/16/2020    Procedure: LEFT TOTAL HIP ARTHROPLASTY;  Surgeon: Conrado Gomez MD;  Location: [facility] OR;  Service: Orthopedics       Current Outpatient Medications   Medication Sig Dispense Refill     chlorthalidone (HYGROTEN) 25 MG tablet [CHLORTHALIDONE (HYGROTEN) 25 MG TABLET] Take 25 mg by mouth daily.       cholecalciferol, vitamin D3, 1,000 unit (25 mcg) tablet [CHOLECALCIFEROL, VITAMIN D3, 1,000 UNIT (25 MCG) TABLET] Take 1,000 Units by mouth daily.       escitalopram (LEXAPRO) 10 MG tablet Take 1 tablet (10 mg) by mouth daily 90 tablet 1     lisinopril (ZESTRIL) 20 MG tablet TAKE 1 TABLET BY MOUTH EVERY DAY 90 tablet 2     multivitamin therapeutic tablet [MULTIVITAMIN THERAPEUTIC TABLET] Take 1 tablet by mouth daily.        potassium chloride jamal ER (KLOR-CON M20) 20 MEQ CR tablet TAKE 1 TABLET BY MOUTH 2 TIMES DAILY 180 tablet 2     rosuvastatin (CRESTOR) 10 MG tablet TAKE 1 TABLET BY MOUTH EVERYDAY AT BEDTIME 90 tablet 3     aspirin 81 MG EC tablet Take 1 tablet (81 mg) by mouth daily (Patient not taking: Reported on 2024)       No current facility-administered medications for this visit.     No Known Allergies     No data available          Social History     Tobacco Use     Smoking status: Former     Current packs/day: 0.00     Types: Cigarettes     Quit date: 1982     Years since quittin.6     Smokeless tobacco: Never     Tobacco comments:     cigars, very occasional, maybe 1 x week   Substance Use Topics     Alcohol use: Not Currently     Drug use: No       REVIEW OF SYSTEMS:                                                      10-point review of systems is negative except as mentioned above in HPI.     EXAM:                                                      Physical Exam:   Vitals: /85   Pulse 69   Wt 98.4 kg (217 lb)   BMI 31.14 kg/m    BMI= Body mass index is 31.14 kg/m .  GENERAL: NAD. Masked facies.  HEENT: NC/AT.   PULM: Non-labored breathing.   Focused Neurologic:  MENTAL STATUS: Alert, attentive. Speech is fluent, slow.  Slow processing speed.  Fair concentration.  Fair fund of knowledge.    CRANIAL NERVES: Visual fields intact to confrontation. Pupils equally, round and reactive to light. Facial movement normal. EOM full. Hearing intact to conversation. Trapezius strength intact. Palate moves symmetrically. Tongue midline.  MOTOR: 5/5 in proximal and distal muscle groups of upper and lower extremities. Tone and bulk normal.   DTRs: Intact and symmetric in biceps, BR, patellae.  Babinski down-going bilaterally.   SENSATION: Normal light touch throughout.  COORDINATION: Normal fine motor movements.   STATION AND GAIT: Casual gait is slow, cautious.  Right hand-dominant.    ASSESSMENT and  PLAN:                                                      Assessment:     ICD-10-CM    1. Cognitive dysfunction  F09 Primary Care - Care Coordination Referral          Plan:  I would like you to keep your appointment for updated neuropsych testing in January.  I have asked our care coordinators to reach out to you to discuss transportation resources so that you do not have to drive there.  We will follow-up after the neuropsych testing is completed.    Total Time: 40 minutes were spent with the patient and in chart review/documentation (as described above in Assessment and Plan) /coordinating the care on date of service.    Imani Becker MD  Neurology    Dragon software used in the dictation of this note.      Again, thank you for allowing me to participate in the care of your patient.        Sincerely,        Imani Becker MD

## 2024-08-08 NOTE — PROGRESS NOTES
INITIAL NEUROLOGY CONSULTATION - Transfer of Care    DATE OF VISIT: 8/8/2024  MRN: [id number]  PATIENT NAME: Iraj De Leon  YOB: 1950    REFERRING PROVIDER: No ref. provider found    No chief complaint on file.      SUBJECTIVE:                                                      HPI:   Iraj De Leon is a 73 year old male here as transfer of care from Dr. Pimentel.  He followed through [facility] previously.  He saw Dr. Pimentel in February for concerns about cognitive changes.  Iraj has additional history of MS, not on any disease modifying therapies.    He reports 2 years of memory difficulties.  When he saw Dr. Ireland, there was also some concern about some speech changes so he was referred for SLP.  MoCA score was 25 6 months ago.  Additional history of stroke and problems with sleep.  He was referred for neuropsych testing, which is pending in January.  His wife has some concerns about getting him there because she is not comfortable driving in [place].  He had previous neuropsych testing completed in July 2022.  This showed mild cognitive dysfunction.  His speech has improved since starting Lexapro.  His wife says that he has trouble with remembering people. He has forgotten past events.  He does puzzles at home. Not much else, per him.    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    Dr. Ireland checked his B1 which was only slightly high, TSH which was normal and B12 which was 541.    Previous MRI showed Right hippocampal atrophy. Driving evaluation completed in 9.2023: No driving recommended.     Past Medical History:   Diagnosis Date    Arthritis     Cancer (H) 02/2020    basal cell Moh's forehead    Diabetes mellitus (H)     type 2, diet controlled as of March 2019    Headache 2014    Due to spontaneous spinal fluid leak    Hyperlipidemia     Hypertension     Kidney stones 2019    Multiple sclerosis (H)     Osteoarthritis     bilateral hips     Pituitary microadenoma (H)     Posterior vitreous detachment      Past Surgical History:   Procedure Laterality Date    BASAL CELL CARCINOMA EXCISION  02/20/2020    Moh's procedure to forehead    CHOLECYSTECTOMY      COLONOSCOPY      EYE SURGERY Right     cataract    JOINT REPLACEMENT  10/2019    RTHA    LITHOTRIPSY  2019    OTHER SURGICAL HISTORY  2009    lipoma removalforehead    OTHER SURGICAL HISTORY      spinal fluid leak    [place] CYSTO/URETERO W/LITHOTRIPSY &INDWELL STENT INSRT Right 3/27/2019    Procedure: CYSTOSCOPY RIGHT RETROGRADE PYELOGRAM, RIGHT URETEROSCOPY WITH LASER  LITHOTRIPSY STONE EXTRACTION AND RIGHT STENT EXCHANGE;  Surgeon: Zia Coyle MD;  Location: [facility] Main OR;  Service: Urology    [facility] TOTAL HIP ARTHROPLASTY Right 10/21/2019    Procedure: RIGHT TOTAL HIP ARTHROPLASTY;  Surgeon: Conrado Gomez MD;  Location: [facility] OR;  Service: Orthopedics    [facility] TOTAL HIP ARTHROPLASTY Left 3/16/2020    Procedure: LEFT TOTAL HIP ARTHROPLASTY;  Surgeon: Conrado Gomez MD;  Location: [facility];  Service: Orthopedics       Current Outpatient Medications   Medication Sig Dispense Refill    chlorthalidone (HYGROTEN) 25 MG tablet [CHLORTHALIDONE (HYGROTEN) 25 MG TABLET] Take 25 mg by mouth daily.      cholecalciferol, vitamin D3, 1,000 unit (25 mcg) tablet [CHOLECALCIFEROL, VITAMIN D3, 1,000 UNIT (25 MCG) TABLET] Take 1,000 Units by mouth daily.      escitalopram (LEXAPRO) 10 MG tablet Take 1 tablet (10 mg) by mouth daily 90 tablet 1    lisinopril (ZESTRIL) 20 MG tablet TAKE 1 TABLET BY MOUTH EVERY DAY 90 tablet 2    multivitamin therapeutic tablet [MULTIVITAMIN THERAPEUTIC TABLET] Take 1 tablet by mouth daily.      potassium chloride jamal ER (KLOR-CON M20) 20 MEQ CR tablet TAKE 1 TABLET BY MOUTH 2 TIMES DAILY 180 tablet 2    rosuvastatin (CRESTOR) 10 MG tablet TAKE 1 TABLET BY MOUTH EVERYDAY AT BEDTIME 90 tablet 3    aspirin 81 MG EC tablet Take 1 tablet (81 mg) by mouth daily  (Patient not taking: Reported on 2024)       No current facility-administered medications for this visit.     No Known Allergies     No data available          Social History     Tobacco Use    Smoking status: Former     Current packs/day: 0.00     Types: Cigarettes     Quit date: 1982     Years since quittin.6    Smokeless tobacco: Never    Tobacco comments:     cigars, very occasional, maybe 1 x week   Substance Use Topics    Alcohol use: Not Currently    Drug use: No       REVIEW OF SYSTEMS:                                                      10-point review of systems is negative except as mentioned above in HPI.     EXAM:                                                      Physical Exam:   Vitals: /85   Pulse 69   Wt 98.4 kg (217 lb)   BMI 31.14 kg/m    BMI= Body mass index is 31.14 kg/m .  GENERAL: NAD. Masked facies.  HEENT: NC/AT.   PULM: Non-labored breathing.   Focused Neurologic:  MENTAL STATUS: Alert, attentive. Speech is fluent, slow.  Slow processing speed.  Fair concentration.  Fair fund of knowledge.    CRANIAL NERVES: Visual fields intact to confrontation. Pupils equally, round and reactive to light. Facial movement normal. EOM full. Hearing intact to conversation. Trapezius strength intact. Palate moves symmetrically. Tongue midline.  MOTOR: 5/5 in proximal and distal muscle groups of upper and lower extremities. Tone and bulk normal.   DTRs: Intact and symmetric in biceps, BR, patellae.  Babinski down-going bilaterally.   SENSATION: Normal light touch throughout.  COORDINATION: Normal fine motor movements.   STATION AND GAIT: Casual gait is slow, cautious.  Right hand-dominant.    ASSESSMENT and PLAN:                                                      Assessment:     ICD-10-CM    1. Cognitive dysfunction  F09 Primary Care - Care Coordination Referral          Plan:  I would like you to keep your appointment for updated neuropsych testing in January.  I have asked our  care coordinators to reach out to you to discuss transportation resources so that you do not have to drive there.  We will follow-up after the neuropsych testing is completed.    Total Time: 40 minutes were spent with the patient and in chart review/documentation (as described above in Assessment and Plan) /coordinating the care on date of service.    Imani Becker MD  Neurology    Dragon software used in the dictation of this note.

## 2024-08-08 NOTE — PATIENT INSTRUCTIONS
Plan:  I would like you to keep your appointment for updated neuropsych testing in January.  I have asked our care coordinators to reach out to you to discuss transportation resources so that you do not have to drive there.  We will follow-up after the neuropsych testing is completed.

## 2024-08-12 ENCOUNTER — PATIENT OUTREACH (OUTPATIENT)
Dept: CARE COORDINATION | Facility: CLINIC | Age: 74
End: 2024-08-12
Payer: MEDICARE

## 2024-08-12 NOTE — LETTER
[facility] CARE COORDINATION  [address]  [facility] [address]    August 12, 2024    Iraj De Leon  [address] [place] [address] [address]  [facility] [address]  [facility] [address]      Dear Iraj,    I am a clinic community health worker who works with Dez Sinclair MD with the [facility]. I wanted to thank you for spending the time to talk with me.  Below is a description of clinic care coordination and how we can further assist you.       The clinic care coordination team is made up of a registered nurse, , financial resource worker and community health worker who understand the health care system. The goal of clinic care coordination is to help you manage your health and improve access to the health care system. Our team works alongside your provider to assist you in determining your health and social needs. We can help you obtain health care and community resources, providing you with necessary information and education. We can work with you through any barriers and develop a care plan that helps coordinate and strengthen the communication between you and your care team.  Our services are voluntary and are offered without charge to you personally.    Please feel free to contact Neida at [phone] with any questions or concerns regarding care coordination and what we can offer.      We are focused on providing you with the highest-quality healthcare experience possible.    Sincerely,     CODY Davis  [facility]  [facility]

## 2024-08-12 NOTE — PROGRESS NOTES
Clinic Care Coordination Contact  Community Health Worker Initial Outreach    CHW Initial Information Gathering:  Referral Source: PCP  Preferred Hospital: [facility]  [phone]  Preferred Urgent Care: [facility], [phone]  No PCP office visit in Past Year: No       Patient accepts CC: No, due to the family wanting to find out why the appointment is scheduled for Neurology due to completing the test two different times. Patient will be sent Care Coordination introduction letter for future reference.     CODY Davis  [phone]  [facility] Care Resource [facility]

## 2024-08-22 ENCOUNTER — TELEPHONE (OUTPATIENT)
Dept: FAMILY MEDICINE | Facility: CLINIC | Age: 74
End: 2024-08-22
Payer: MEDICARE

## 2024-08-22 NOTE — TELEPHONE ENCOUNTER
Please call patient . He should be seen today in the office or in urgent care if he thinks he has an infection with abdominal pain. Lali Nino M.D.

## 2024-08-22 NOTE — TELEPHONE ENCOUNTER
Call placed to patient  Patient's wife answered    Relayed Dr. Nino's message  Wife states patient didn't need an appointment and she was the one who scheduled an appointment for herself  Relayed the appointment was scheduled for her   Appointment cancelled     Tal Lai, Clinic RN  [facility]

## 2024-09-23 ENCOUNTER — MYC MEDICAL ADVICE (OUTPATIENT)
Dept: FAMILY MEDICINE | Facility: CLINIC | Age: 74
End: 2024-09-23
Payer: MEDICARE

## 2024-09-23 DIAGNOSIS — F41.9 ANXIETY: ICD-10-CM

## 2024-09-23 RX ORDER — ESCITALOPRAM OXALATE 20 MG/1
20 TABLET ORAL DAILY
Qty: 90 TABLET | Refills: 0 | Status: SHIPPED | OUTPATIENT
Start: 2024-09-23

## 2024-09-23 NOTE — TELEPHONE ENCOUNTER
Dr. Sinclair, do you think referring patient to the ED or scheduling an appointment is best?    Kandi Reyes, RN on 9/23/2024 at 10:50 AM

## 2024-10-09 ASSESSMENT — ANXIETY QUESTIONNAIRES
1. FEELING NERVOUS, ANXIOUS, OR ON EDGE: NOT AT ALL
2. NOT BEING ABLE TO STOP OR CONTROL WORRYING: NOT AT ALL
5. BEING SO RESTLESS THAT IT IS HARD TO SIT STILL: SEVERAL DAYS
IF YOU CHECKED OFF ANY PROBLEMS ON THIS QUESTIONNAIRE, HOW DIFFICULT HAVE THESE PROBLEMS MADE IT FOR YOU TO DO YOUR WORK, TAKE CARE OF THINGS AT HOME, OR GET ALONG WITH OTHER PEOPLE: SOMEWHAT DIFFICULT
GAD7 TOTAL SCORE: 2
8. IF YOU CHECKED OFF ANY PROBLEMS, HOW DIFFICULT HAVE THESE MADE IT FOR YOU TO DO YOUR WORK, TAKE CARE OF THINGS AT HOME, OR GET ALONG WITH OTHER PEOPLE?: SOMEWHAT DIFFICULT
6. BECOMING EASILY ANNOYED OR IRRITABLE: SEVERAL DAYS
GAD7 TOTAL SCORE: 2
GAD7 TOTAL SCORE: 2
7. FEELING AFRAID AS IF SOMETHING AWFUL MIGHT HAPPEN: NOT AT ALL
4. TROUBLE RELAXING: NOT AT ALL
7. FEELING AFRAID AS IF SOMETHING AWFUL MIGHT HAPPEN: NOT AT ALL
3. WORRYING TOO MUCH ABOUT DIFFERENT THINGS: NOT AT ALL

## 2024-10-09 ASSESSMENT — PATIENT HEALTH QUESTIONNAIRE - PHQ9
SUM OF ALL RESPONSES TO PHQ QUESTIONS 1-9: 9
10. IF YOU CHECKED OFF ANY PROBLEMS, HOW DIFFICULT HAVE THESE PROBLEMS MADE IT FOR YOU TO DO YOUR WORK, TAKE CARE OF THINGS AT HOME, OR GET ALONG WITH OTHER PEOPLE: SOMEWHAT DIFFICULT
SUM OF ALL RESPONSES TO PHQ QUESTIONS 1-9: 9

## 2024-10-10 ENCOUNTER — OFFICE VISIT (OUTPATIENT)
Dept: FAMILY MEDICINE | Facility: CLINIC | Age: 74
End: 2024-10-10
Payer: MEDICARE

## 2024-10-10 VITALS
TEMPERATURE: 97.6 F | HEART RATE: 83 BPM | BODY MASS INDEX: 31.78 KG/M2 | SYSTOLIC BLOOD PRESSURE: 130 MMHG | OXYGEN SATURATION: 92 % | DIASTOLIC BLOOD PRESSURE: 84 MMHG | RESPIRATION RATE: 14 BRPM | WEIGHT: 221.5 LBS

## 2024-10-10 DIAGNOSIS — F41.9 ANXIETY: ICD-10-CM

## 2024-10-10 DIAGNOSIS — G93.89 ENCEPHALOMALACIA ON IMAGING STUDY: ICD-10-CM

## 2024-10-10 DIAGNOSIS — E11.9 TYPE 2 DIABETES MELLITUS WITHOUT COMPLICATION, WITHOUT LONG-TERM CURRENT USE OF INSULIN (H): ICD-10-CM

## 2024-10-10 DIAGNOSIS — R26.81 GAIT INSTABILITY: ICD-10-CM

## 2024-10-10 DIAGNOSIS — E87.6 HYPOKALEMIA: Primary | ICD-10-CM

## 2024-10-10 DIAGNOSIS — M16.4 POST-TRAUMATIC OSTEOARTHRITIS OF BOTH HIPS: ICD-10-CM

## 2024-10-10 DIAGNOSIS — F33.1 MODERATE EPISODE OF RECURRENT MAJOR DEPRESSIVE DISORDER (H): ICD-10-CM

## 2024-10-10 DIAGNOSIS — G35 MULTIPLE SCLEROSIS (H): ICD-10-CM

## 2024-10-10 PROCEDURE — 99214 OFFICE O/P EST MOD 30 MIN: CPT | Performed by: FAMILY MEDICINE

## 2024-10-10 RX ORDER — ESCITALOPRAM OXALATE 20 MG/1
20 TABLET ORAL DAILY
Qty: 90 TABLET | Refills: 0 | Status: SHIPPED | OUTPATIENT
Start: 2024-10-10

## 2024-10-10 ASSESSMENT — ENCOUNTER SYMPTOMS: NERVOUS/ANXIOUS: 1

## 2024-10-10 NOTE — PROGRESS NOTES
Assessment & Plan   Gait instability  - Home Care Referral  - Hospice Referral; Future    Post-traumatic osteoarthritis of both hips  He does not want to do physical therapy.  Too much pain.    - Home Care Referral  - Hospice Referral; Future  Anxiety  Poor control with depression.  He does not want to get better.  He is hoping to die. He feels he has acomplished what he wants to in life.  Hopes to die in his sleep  - escitalopram (LEXAPRO) 20 MG tablet; Take 1 tablet (20 mg) by mouth daily.    Hypokalemia  - Comprehensive metabolic panel (BMP + Alb, Alk Phos, ALT, AST, Total. Bili, TP); Future    Type 2 diabetes mellitus without complication, without long-term current use of insulin (H)  - Hemoglobin A1c; Future  - Hospice Referral; Future    Moderate episode of recurrent major depressive disorder (H) His desire for death is complicated by adjustment disorder with depression.  His maladjustment is his dislike of ageing, and not wanting to work on his health.  He asks if he tries to hold his breath while falling asleep if he is more likely to die in his sleep.  He does not appear to be actively suicidal.  I do not think he is an eminent threat to himself. So I will not place a suicidal hold on him.  He is not interested in talking with a counselor about this.  He is okay with talking about this with hospice.  If he deos not take care of himself, and he stops his medications he definitely has a less than 2 year life span to qualify for hospice, but for medicare payment for hospice.    - Adult Mental Health  Referral; Future    Hopefully after discussion with hospice it will be easier to determine how much he is suicidal, how much he just is ready to die and or what can be done to improve his quality of life.    His wife does not think he is actively suicidal.  She does think he truly wants to die.  I agree with that assessment.  On the other hand I would not be surprised if he had a suicide attempt in the  "near future. Just not imminent.   He is very high risk as he feels he has nothing to live for.  But he does not want to kill himself when directly asked.      Encephalomalacia on imaging study  - Home Care Referral  - Hospice Referral; Future    Multiple sclerosis (H)  - Hospice Referral; Future          BMI  Estimated body mass index is 31.78 kg/m  as calculated from the following:    Height as of 5/16/24: 1.778 m (5' 10\").    Weight as of this encounter: 100.5 kg (221 lb 8 oz).   Weight management plan: Discussed healthy diet and exercise guidelines    Depression Screening Follow Up        10/9/2024     1:05 PM   PHQ   PHQ-9 Total Score 9   Q9: Thoughts of better off dead/self-harm past 2 weeks Several days   F/U: Thoughts of suicide or self-harm No   F/U: Safety concerns No         10/9/2024     1:05 PM   Last PHQ-9   1.  Little interest or pleasure in doing things 1   2.  Feeling down, depressed, or hopeless 1   3.  Trouble falling or staying asleep, or sleeping too much 1   4.  Feeling tired or having little energy 1   5.  Poor appetite or overeating 3   6.  Feeling bad about yourself 1   7.  Trouble concentrating 0   8.  Moving slowly or restless 0   Q9: Thoughts of better off dead/self-harm past 2 weeks 1   PHQ-9 Total Score 9   In the past two weeks have you had thoughts of suicide or self harm? No   Do you have concerns about your personal safety or the safety of others? No             Mental Health Referral placed  Hospice referral placed.   He is not intereted in any crisis manament information as he does not want to live.      Discussed the following ways the patient can remain in a safe environment with patient and wife.    remove alcohol, remove drugs, secure medications:  , dispose of old medications , remove access to firearms:  , remove things I could use to hurt myself:  , and be around others      Flor Galo is a 73 year old, presenting for the following health issues:  Depression and " Anxiety      10/10/2024     9:00 AM   Additional Questions   Roomed by TEE Jensen   Accompanied by wife     Anxiety    History of Present Illness       Mental Health Follow-up:  Patient presents to follow-up on Depression.Patient's depression since last visit has been:  No change  The patient is not having other symptoms associated with depression.      Any significant life events: No  Patient is not feeling anxious or having panic attacks.  Patient has no concerns about alcohol or drug use.    Reason for visit:  Med follow up and uti?  Symptom onset:  1-2 weeks ago  Symptom intensity:  Moderate  Symptom progression:  Staying the same  Had these symptoms before:  No   He is taking medications regularly.         Scheduled to get covid & flu at residence 10/15.     ED/UC Followup:  Facility:  [facility]   Date of visit: 10/8/24  Reason for visit: Urinary frequency (Primary Dx);   Transient confusion   Current Status: Wife states he is going to the bathroom frequently, will sometimes urinate and sometimes unable to.                  Objective    There were no vitals taken for this visit.  There is no height or weight on file to calculate BMI.  Physical Exam               Signed Electronically by: Dez Sinclair MD

## 2024-10-11 ENCOUNTER — TELEPHONE (OUTPATIENT)
Dept: FAMILY MEDICINE | Facility: CLINIC | Age: 74
End: 2024-10-11
Payer: MEDICARE

## 2024-10-11 ENCOUNTER — MYC MEDICAL ADVICE (OUTPATIENT)
Dept: FAMILY MEDICINE | Facility: CLINIC | Age: 74
End: 2024-10-11
Payer: MEDICARE

## 2024-10-11 NOTE — TELEPHONE ENCOUNTER
MARCE Ochoa from [facility], calling as an FYI that they are going to speak to the spouse of patient on Monday to see if patient is signed on with Hospice before starting care. If not, RN will call back with updates/orders.    Camden HOLLINGSWORTH RN  [facility]

## 2024-10-14 ENCOUNTER — HOSPITAL ENCOUNTER (EMERGENCY)
Facility: HOSPITAL | Age: 74
Discharge: HOME | End: 2024-10-14
Attending: EMERGENCY MEDICINE | Admitting: EMERGENCY MEDICINE
Payer: MEDICARE

## 2024-10-14 VITALS
HEIGHT: 69 IN | BODY MASS INDEX: 31.7 KG/M2 | RESPIRATION RATE: 20 BRPM | TEMPERATURE: 98.7 F | WEIGHT: 214 LBS | DIASTOLIC BLOOD PRESSURE: 98 MMHG | SYSTOLIC BLOOD PRESSURE: 189 MMHG | HEART RATE: 79 BPM | OXYGEN SATURATION: 92 %

## 2024-10-14 DIAGNOSIS — R45.851 VERBALIZES SUICIDAL THOUGHTS: ICD-10-CM

## 2024-10-14 DIAGNOSIS — Z74.09 LIMITED MOBILITY: ICD-10-CM

## 2024-10-14 PROBLEM — F41.1 GAD (GENERALIZED ANXIETY DISORDER): Status: ACTIVE | Noted: 2024-10-14

## 2024-10-14 PROBLEM — F33.2 MDD (MAJOR DEPRESSIVE DISORDER), RECURRENT SEVERE, WITHOUT PSYCHOSIS (H): Status: ACTIVE | Noted: 2024-10-14

## 2024-10-14 LAB
ALBUMIN UR-MCNC: 10 MG/DL
AMORPH CRY #/AREA URNS HPF: ABNORMAL /HPF
APPEARANCE UR: ABNORMAL
BILIRUB UR QL STRIP: NEGATIVE
COLOR UR AUTO: ABNORMAL
GLUCOSE UR STRIP-MCNC: 200 MG/DL
HGB UR QL STRIP: NEGATIVE
KETONES UR STRIP-MCNC: NEGATIVE MG/DL
LEUKOCYTE ESTERASE UR QL STRIP: NEGATIVE
MUCOUS THREADS #/AREA URNS LPF: PRESENT /LPF
NITRATE UR QL: NEGATIVE
PH UR STRIP: 7 [PH] (ref 5–7)
RBC URINE: <1 /HPF
SP GR UR STRIP: 1.02 (ref 1–1.03)
SQUAMOUS EPITHELIAL: <1 /HPF
UROBILINOGEN UR STRIP-MCNC: <2 MG/DL
WBC URINE: 0 /HPF

## 2024-10-14 PROCEDURE — 81001 URINALYSIS AUTO W/SCOPE: CPT | Performed by: EMERGENCY MEDICINE

## 2024-10-14 PROCEDURE — 99284 EMERGENCY DEPT VISIT MOD MDM: CPT

## 2024-10-14 ASSESSMENT — COLUMBIA-SUICIDE SEVERITY RATING SCALE - C-SSRS
2. HAVE YOU ACTUALLY HAD ANY THOUGHTS OF KILLING YOURSELF IN THE PAST MONTH?: YES
6. HAVE YOU EVER DONE ANYTHING, STARTED TO DO ANYTHING, OR PREPARED TO DO ANYTHING TO END YOUR LIFE?: NO
5. HAVE YOU STARTED TO WORK OUT OR WORKED OUT THE DETAILS OF HOW TO KILL YOURSELF? DO YOU INTEND TO CARRY OUT THIS PLAN?: NO
3. HAVE YOU BEEN THINKING ABOUT HOW YOU MIGHT KILL YOURSELF?: YES
1. IN THE PAST MONTH, HAVE YOU WISHED YOU WERE DEAD OR WISHED YOU COULD GO TO SLEEP AND NOT WAKE UP?: YES

## 2024-10-14 ASSESSMENT — ACTIVITIES OF DAILY LIVING (ADL)
ADLS_ACUITY_SCORE: 35

## 2024-10-14 NOTE — ED NOTES
Patient is repeatedly asking to leave during DEC assessment, asking when he can go home and if we can arrange for transportation for him to go back to his apartment.

## 2024-10-14 NOTE — DISCHARGE INSTRUCTIONS
Writer encourage Pt to take his medications as prescribed and keep all of scheduled appointments with his outpatient service providers.  Writer recommended Pt to follow up with his current outpatient PCP and engage in physical and occupational therapy for his hip surgeries.  Writer recommended Pt to engage in new outpatient psychiatry for medication management and individual therapy service to improve coping skills.  However, Pt declined to schedule new outpatient mental health service appointments.  DEC coordinator will contact Pt within next 1 or 2 business days to ensure coordination of care and provide assistance with appointments.    Below is a list of FREE Mental Health Options in the [facility] Area:    [facility]  Serves those in emotional crisis with 24-hour, seven-day-a-week crisis counseling, assessment, referral, and medication management.   Suicidal: [phone] Consultation: [phone]  [address] [place] [address] 24/7 [facility] Center     [facility]  [phone]  Serves those in need of free outpatient mental health care  Hours: Mon, Wed, Fri 1-3pm; Mon-Thurs 6:30-8:30pm    [facility]  [address] [place] [id number]  [phone]

## 2024-10-14 NOTE — ED TRIAGE NOTES
Pt reportedly was asking Hospice nurse questions about holding his breath to end his life.     Pt w/hx of dementia and bilat hip surgery in past 5 years, pt reports wanting to end his hip pain, however pt reports not taking any meds for pain daily, and reports only small amount of bilat hip pain when ambulating w/walker to bathroom this morning.     Triage Assessment (Adult)       Row Name 10/14/24 1542          Triage Assessment    Airway WDL WDL        Respiratory WDL    Respiratory WDL WDL        Skin Circulation/Temperature WDL    Skin Circulation/Temperature WDL WDL        Cardiac WDL    Cardiac WDL WDL        Peripheral/Neurovascular WDL    Peripheral Neurovascular WDL WDL        Cognitive/Neuro/Behavioral WDL    Cognitive/Neuro/Behavioral WDL orientation     Orientation disoriented to;place;time

## 2024-10-14 NOTE — CONSULTS
Diagnostic Evaluation Consultation  Crisis Assessment    Patient Name: Iraj De Leon  Age:  73 year old  Legal Sex: male  Gender Identity: male  Pronouns:   Race: White  Ethnicity: Not  or   Language: English      Patient was assessed: Virtual: [place]   Crisis Assessment Start Date: 10/14/24  Crisis Assessment Start Time: 1642  Crisis Assessment Stop Time: 1720  Patient location: [facility] EMERGENCY DEPARTMENT                             [id number]    Referral Data and Chief Complaint  Iraj De Leon presents to the ED via EMS. Patient is presenting to the ED for the following concerns: Suicidal ideation, Depression, Significant behavioral change, Worsening psychosocial stress, Anxiety, Health stressors.   Factors that make the mental health crisis life threatening or complex are:  Pt has history of dementia and recently has increased suicidal ideations with limited coping skills and support system..      Informed Consent and Assessment Methods  Explained the crisis assessment process, including applicable information disclosures and limits to confidentiality, assessed understanding of the process, and obtained consent to proceed with the assessment.  Assessment methods included conducting a formal interview with patient, review of medical records, collaboration with medical staff, and obtaining relevant collateral information from family and community providers when available.  : done     Patient response to interventions: eager to participate, needs reinforcement, acceptance expressed, verbalizes understanding  Coping skills were attempted to reduce the crisis:  playing computer game, working on puzzles, watching TV, listening to music     History of the Crisis   Pt is a 73 year old White male with history of depression, anxiety,, MS and dementia.  Pt was brought to the ER today by EMS due to worsening of depression, and suicidal ideations.  Pt was calm, alert and oriented  "4x.  Pt was not oriented to reason why he was brought to the ER today.  Pt replied, \"I don't know, I did not request it.\" when writer asked why he was brought to the ER today.  However, Pt was able to identify having hip surgery, ongoing hip pain and difficulty moving around as stressors leading to his current mental health crisis.  Pt did not identify any other concerns or triggers leading to his current mental health crisis.  Pt became anxious towards end of his DEC Assessment as he wanted to go back home as he kept asking this writer about transportation to get back to home.  Pt also seemed to minimize his mental health symptoms as he denied having depression and anxiety.  However, Pt endorsed feeling worthless, cry and isolation.  Pt reported having poor sleep but normal appetite.  Pt denied having acute psychosis and teja.  Pt currently denied having suicidal ideations, intent and plan.  However, Pt has been reportedly asking the hospice nurse about holding his breath and how to end his life for the past 2 weeks.  Pt denied having homicidal ideations, access to firearms, history of SIB and previous suicide attempt.    Brief Psychosocial History  Family:  , Children no  Support System:     Employment Status:  retired, unemployed  Source of Income:  unable to assess  Financial Environmental Concerns:  unemployed, none  Current Hobbies:  arts/crafts, social media/computer activities, television/movies/videos, music, exercise/fitness, games, puzzles  Barriers in Personal Life:  mental health concerns, behavioral concerns, mobility limitations, lack of motivation, emotional concerns, medical conditions/precautions    Significant Clinical History  Current Anxiety Symptoms:  anxious  Current Depression/Trauma:  hopelessness, apathy, crying or feels like crying, sadness, thoughts of death/suicide, withdrawl/isolation, impaired decision making, helplessness  Current Somatic Symptoms:  anxious  Current " Psychosis/Thought Disturbance:  forgetful, inattentive  Current Eating Symptoms:     Chemical Use History:  Alcohol: None  Benzodiazepines: None  Opiates: None  Cocaine: None  Marijuana: None  Other Use: None   Past diagnosis:  Anxiety Disorder, Depression, Other (history of dementia)  Family history:  No known history of mental health or chemical health concerns  Past treatment:  Primary Care, Psychiatric Medication Management  Details of most recent treatment:  Pt reported no recent treatment.  Pt reported he has no current outpatient mental health service providers.  Other relevant history:  Pt shared he had a brother who passed away.  Pt reported he was  and has no children.  Pt has history of MS, chronic pain and hip surgeryas his medical conditions.  Pt denied having legal issues and history of being abused.       Collateral Information  Is there collateral information: Yes     Collateral information name, relationship, phone number:  Wife, Jeniffer De Leon [phone]    What happened today: Jeniffer reported Pt was being evaluated for 2nd opinion for hospice placement as he wanted to die.  Jeniffer reported they lived in [facility] together and Pt has been making comments about suicide and wanting to die by holding his breath which lead him to the ER visit today.     What is different about patient's functioning: Jeniffer reported Pt had hip surgeries 2 years ago and he belived the surgeron removed his muscles which impact his mobility.  Jeniffer reported Pt did not believe he will get better and struggling with incontinence.  Jeniffer reported Pt seemed to have increased appetite with normal sleep.  Jeniffer noted Pt has been taking his Lexapro for depression consistently but has not been taking other medications.     Concern about alcohol/drug use:      What do you think the patient needs:      Has patient made comments about wanting to kill themselves/others: yes    If d/c is recommended, can they take  part in safety/aftercare planning:  yes    Additional collateral information:  Jeniffer reported Pt has been refusing to see a therapist as this was recommended by his current outpatient PCP.  Jeniffer reviewed and agreed with outpatient mental health service recommendations for Pt.     Risk Assessment  Camp Suicide Severity Rating Scale Full Clinical Version:  Suicidal Ideation  Q1 Wish to be Dead (Lifetime): Yes  Q2 Non-Specific Active Suicidal Thoughts (Lifetime): Yes  3. Active Suicidal Ideation with any Methods (Not Plan) Without Intent to Act (Lifetime): No  Q4 Active Suicidal Ideation with Some Intent to Act, Without Specific Plan (Lifetime): No  Q5 Active Suicidal Ideation with Specific Plan and Intent (Lifetime): No  Q6 Suicide Behavior (Lifetime): no     Suicidal Behavior (Lifetime)  Actual Attempt (Lifetime): No  Has subject engaged in non-suicidal self-injurious behavior? (Lifetime): No  Interrupted Attempts (Lifetime): No  Aborted or Self-Interrupted Attempt (Lifetime): No  Preparatory Acts or Behavior (Lifetime): No    Camp Suicide Severity Rating Scale Recent:   Suicidal Ideation (Recent)  Q1 Wished to be Dead (Past Month): yes  Q2 Suicidal Thoughts (Past Month): yes  Q3 Suicidal Thought Method: yes  Q4 Suicidal Intent without Specific Plan: no  Q5 Suicide Intent with Specific Plan: no  Level of Risk per Screen: moderate risk  Intensity of Ideation (Recent)  Frequency (Past 1 Month): Once a week  Suicidal Behavior (Recent)  Actual Attempt (Past 3 Months): No  Has subject engaged in non-suicidal self-injurious behavior? (Past 3 Months): No  Interrupted Attempts (Past 3 Months): No  Aborted or Self-Interrupted Attempt (Past 3 Months): No  Preparatory Acts or Behavior (Past 3 Months): No    Environmental or Psychosocial Events: recent life events (see comment), worsening chronic illness, social isolation, other life stressors, helplessness/hopelessness  Protective Factors: Protective Factors: lives in  a responsibly safe and stable environment, intact marriage or domestic partnership, help seeking, constructive use of leisure time, enjoyable activities, resilience, reality testing ability    Does the patient have thoughts of harming others? Feels Like Hurting Others: no  Previous Attempt to Hurt Others: no  Current presentation:  (Pt was calm, alert, oriented, engaged and cooperative.)  Is the patient engaging in sexually inappropriate behavior?: no    Is the patient engaging in sexually inappropriate behavior?  no        Mental Status Exam   Affect: Constricted  Appearance: Appropriate  Attention Span/Concentration: Attentive  Eye Contact: Variable    Fund of Knowledge: Appropriate, Delayed   Language /Speech Content: Fluent  Language /Speech Volume: Normal  Language /Speech Rate/Productions: Normal  Recent Memory: Variable  Remote Memory: Variable  Mood: Anxious, Apathetic, Depressed, Sad  Orientation to Person: Yes   Orientation to Place: Yes  Orientation to Time of Day: Yes  Orientation to Date: Yes     Situation (Do they understand why they are here?): No  Psychomotor Behavior: Normal  Thought Content: Clear  Thought Form: Intact     Mini-Cog Assessment  Number of Words Recalled:    Clock-Drawing Test:     Three Item Recall:    Mini-Cog Total Score:       Medication  Psychotropic medications:   Medication Orders - Psychiatric (From admission, onward)      None             Current Care Team  Patient Care Team:  Dez Sinclair MD as PCP - General (Family Practice)  Aurelio Pimentel MD as MD (Neurology)  Saeed Love MD as Referring Physician (Neurology)  Dez Sinclair MD as Assigned PCP  Imnai Bello MD as Assigned Neuroscience Provider    Diagnosis  Patient Active Problem List   Diagnosis Code    Degenerative joint disease (DJD) of hip M16.9    Essential hypertension I10    Type 2 diabetes mellitus without complication, without long-term current use of insulin (H) E11.9     Screening for AAA (abdominal aortic aneurysm) Z13.6    Acute right-sided low back pain without sciatica M54.50    Calcium oxalate monohydrate kidney stones N20.0    Chronic left shoulder pain M25.512, G89.29    CSF leak G96.00    Diuretic-induced hypokalemia E87.6, T50.2X5A    Encephalomalacia on imaging study G93.89    Family history of stomach cancer Z80.0    History of actinic keratoses Z87.2    History of tobacco use Z87.891    Hyperoxaluria R82.992    Mixed hyperlipidemia E78.2    Multiple sclerosis (H) G35    Need for hepatitis C screening test Z11.59    Onychomycosis B35.1    Other seborrheic keratosis L82.1    Sleep disturbance G47.9    Snoring R06.83    Impaired cognition R41.89    Paresis of lower extremity (H) G83.10    Alzheimer's disease (H) G30.9, F02.80    Anxiety F41.9    MDD (major depressive disorder), recurrent severe, without psychosis (H) F33.2    CHARLIE (generalized anxiety disorder) F41.1       Primary Problem This Admission  Active Hospital Problems    MDD (major depressive disorder), recurrent severe, without psychosis (H)      CHARLIE (generalized anxiety disorder)        Clinical Summary and Substantiation of Recommendations   Pt presenting in the ER today due to worsening of depression and suicidal ideations.  Pt reportedly has been asking the hospice nurse about suicidal ideations, holding his breath and how to end his life for the past 2 weeks.  Pt initially was not able to identify events or triggers leading to his current mental health crisis.  However, Pt was able to identify having hip surgery, ongoing hip pain and difficulty moving around as stressors leading to his current mental health crisis. Pt became anxious towards end of his DEC Assessment as he wanted to go back home as he kept asking this writer about transportation to get back to home. Pt also seemed to minimize his mental health symptoms as he denied having depression and anxiety. However, Pt endorsed feeling worthless, cry and  isolation. Pt reported having poor sleep but normal appetite. Pt denied having acute psychosis and teja. Pt currently denied having suicidal ideations, intent and plan. However, Pt has been reportedly asking the hospice nurse about holding his breath and how to end his life for the past 2 weeks. Pt denied having homicidal ideations, access to firearms, history of SIB and previous suicide attempt.  Pt was able to engage in his DEC Safety plan as he felt safe to return home.  Pt has no current active suicidal ideations, intent and plan.  Pt was not holdable, not imminent danger to himself or to others.  Pt was appropriate to return to his current senior living facility and outpatient mental health services.                          Patient coping skills attempted to reduce the crisis:  playing computer game, working on puzzles, watching TV, listening to music    Disposition  Recommended disposition: Individual Therapy, Medication Management        Reviewed case and recommendations with attending provider. Attending Name: Roland Daley MD       Attending concurs with disposition: yes       Patient and/or validated legal guardian concurs with disposition:   yes       Final disposition:  discharge    Legal status on admission:      Assessment Details   Total duration spent with the patient: 38 min     CPT code(s) utilized: 06207 - Psychotherapy for Crisis - 60 (30-74*) min    Chente Mead [facility], Psychotherapist  DEC - Triage & Transition Services  Callback: [phone]

## 2024-10-14 NOTE — ED PROVIDER NOTES
EMERGENCY DEPARTMENT ENCOUNTER       ED Course & Medical Decision Making   3:58 PM I met with the patient and conducted the initial exam and interview.    I saw and examined the patient.  He denies any medical complaints to me other than the chronic mobility issues related to his total hip arthroplasties.  He does endorse some suicidal thoughts and wanting to be dead but denies any plan to harm himself or anyone else.  He did have some passive thoughts of maybe holding his breath but after DEC assessed him and talked with him he states he is feeling better and he does not endorse any plan of suicide or active suicidality and he would like to go home.  DEC did reach out to his wife for collateral and she states that sounds consistent.  They do feel that he be safe at home.    He did endorse some urinary complaints to nursing staff, however when I spoke with him he states he does not have any urinary complaints.    He is oriented and he does appear to have decision-making capacity in my assessment    We will send the urine but he does not want to wait for the results.  I told him he needs to follow-up with his PCP for those results    I also implored him to follow-up with outpatient psychiatric services and he has information for this and referrals placed.  He should also follow-up with physical therapy as this might help with his mobility issues and improve his outlook    If anything goes wrong he is encouraged to return here    Medical Decision Making  Obtained supplemental history:Supplemental history obtained?: No  Reviewed external records: External records reviewed?: No  Care impacted by chronic illness:Dementia  Care significantly affected by social determinants of health:N/A  Did you consider but not order tests?: Work up considered but not performed and documented in chart, if applicable  Did you interpret images independently?: Independent interpretation of ECG and images noted in documentation, when  applicable.  Consultation discussion with other provider:Did you involve another provider (consultant, , pharmacy, etc.)?: I discussed the care with another health care provider, see documentation for details.  Discharge. No recommendations on prescription strength medication(s). See documentation for any additional details.        Prior to making a final disposition on this patient the results of patient's tests and other diagnostic studies were discussed with the patient. All questions were answered. Patient expressed understanding of the plan and was amenable to it.    Medications - No data to display    Final Impression     1. Limited mobility    2. Verbalizes suicidal thoughts            Chief Complaint     Chief Complaint   Patient presents with    Suicidal            HPI       Iraj De Leon is a pleasant 73 year old male with a history of Alzheimer's presenting with suicidal ideation.    In 2019, patient underwent right hip surgery and six months later he underwent left hip surgery. Patient's recovery was very difficult, and he had limited mobility and muscle strength. Patient attempted physical therapy which did not help. For the past few years, patient has used a walker and is uncomfortable. He wishes that he could die and he has thought about not eating and holding his breath. His wife knows about his thoughts but has nothing to say. Patient takes 1 Lexapro 20 mg every morning. He has no history of suicidal ideation, surgery, and recent medication changes.      I, Brianna Broussard am serving as a scribe to document services personally performed by Roland Daley M.D. based on my observation and the provider's statements to me. IRoland M.D attest that Brianna Broussard is acting in a scribe capacity, has observed my performance of the services and has documented them in accordance with my direction.    Past Medical History     Past Medical History:   Diagnosis Date    Arthritis     Cancer (H)  2020    Diabetes mellitus (H)     Headache 2014    Hyperlipidemia     Hypertension     Kidney stones 2019    Multiple sclerosis (H)     Osteoarthritis     Pituitary microadenoma (H)     Posterior vitreous detachment      Past Surgical History:   Procedure Laterality Date    BASAL CELL CARCINOMA EXCISION  2020    Moh's procedure to forehead    CHOLECYSTECTOMY      COLONOSCOPY      EYE SURGERY Right     cataract    JOINT REPLACEMENT  10/2019    RTHA    LITHOTRIPSY  2019    OTHER SURGICAL HISTORY  2009    lipoma removalforehead    OTHER SURGICAL HISTORY      spinal fluid leak    SC CYSTO/URETERO W/LITHOTRIPSY &INDWELL STENT INSRT Right 3/27/2019    Procedure: CYSTOSCOPY RIGHT RETROGRADE PYELOGRAM, RIGHT URETEROSCOPY WITH LASER  LITHOTRIPSY STONE EXTRACTION AND RIGHT STENT EXCHANGE;  Surgeon: Zia Coyle MD;  Location: [facility];  Service: Urology    [facility] TOTAL HIP ARTHROPLASTY Right 10/21/2019    Procedure: RIGHT TOTAL HIP ARTHROPLASTY;  Surgeon: Conrado Gomez MD;  Location: [facility] OR;  Service: Orthopedics    [facility] TOTAL HIP ARTHROPLASTY Left 3/16/2020    Procedure: LEFT TOTAL HIP ARTHROPLASTY;  Surgeon: Conrado Gomez MD;  Location: [facility];  Service: Orthopedics     No family history on file.   Social History     Tobacco Use    Smoking status: Former     Current packs/day: 0.00     Types: Cigarettes     Quit date: 1982     Years since quittin.8    Smokeless tobacco: Never    Tobacco comments:     cigars, very occasional, maybe 1 x week   Substance Use Topics    Alcohol use: Not Currently    Drug use: No       Relevant past medical, surgical, family and social history as documented above, has been reviewed and discussed with patient. No changes or additions, unless otherwise noted in the HPI.    Current Medications     aspirin 81 MG EC tablet  chlorthalidone (HYGROTEN) 25 MG tablet  cholecalciferol, vitamin D3, 1,000 unit (25 mcg) tablet  escitalopram  "(LEXAPRO) 20 MG tablet  lisinopril (ZESTRIL) 20 MG tablet  multivitamin therapeutic tablet  potassium chloride jamal ER (KLOR-CON M20) 20 MEQ CR tablet  rosuvastatin (CRESTOR) 10 MG tablet        Allergies     No Known Allergies    Review of Systems     Review of Systems   Psychiatric/Behavioral:  Positive for suicidal ideas.         Remainder of systems reviewed, unless noted in HPI all others negative.    Physical Exam     BP (!) 178/89   Pulse 64   Temp 98.7  F (37.1  C) (Oral)   Resp 18   Ht 1.753 m (5' 9\")   Wt 97.1 kg (214 lb)   SpO2 96%   BMI 31.60 kg/m      Physical Exam  Vitals and nursing note reviewed.   Constitutional:       General: He is not in acute distress.  HENT:      Head: Normocephalic.      Nose: Nose normal.   Eyes:      General: No scleral icterus.  Cardiovascular:      Rate and Rhythm: Normal rate.   Pulmonary:      Effort: Pulmonary effort is normal.   Musculoskeletal:      Cervical back: Neck supple.   Skin:     Findings: No rash.   Neurological:      Mental Status: He is alert. Mental status is at baseline.   Psychiatric:         Mood and Affect: Mood normal.         Thought Content: Thought content is not delusional. Thought content does not include homicidal ideation. Thought content does not include homicidal or suicidal plan.             Labs & Imaging         Labs Ordered and Resulted from Time of ED Arrival to Time of ED Departure - No data to display           Roland Daley MD  Emergency Medicine  [facility] EMERGENCY DEPARTMENT  [address] [place] [id number]  [phone]  10/14/2024         Roland Daley MD  10/14/24 1837    "

## 2024-10-15 ENCOUNTER — TELEPHONE (OUTPATIENT)
Dept: FAMILY MEDICINE | Facility: CLINIC | Age: 74
End: 2024-10-15
Payer: MEDICARE

## 2024-10-15 DIAGNOSIS — G35 MULTIPLE SCLEROSIS (H): Primary | ICD-10-CM

## 2024-10-15 NOTE — TELEPHONE ENCOUNTER
I am sorry that happened.  The way Iraj has talked I can see why she felt he was suicidal.  I wish she would have handled it a different way like calling our office.  [facility].    Dez

## 2024-10-15 NOTE — TELEPHONE ENCOUNTER
Spoke with wife who states Dr. Sinclair sent a referral to [facility] for an evaluation as patient was wanting to stop taking his meds and focus on comfort cares only.     [facility] came out, felt he was suicidal and hospice called 911. Wife is very upset as the RN did not give her any warning or indication that the police and ambulance were in route.     Patient was taken to [facility] ED and they felt he was not suicidal, checked a UA and sent him home. Patient had a good visit with the ED provider and now he is wanting to pursue home care services for in home therapy through [facility]. Wife wants nothing to do with [facility].     Wife states he is taking his medications, he is much more cooperative.     Wife also wondering if you are wanting to have his A1C and CMP rechecked since they are no longer going the hospice route? Home care RN to draw?     Home Care referral pended, message routed to Dr. Dez Sinclair for consideration of orders/labs.    Julie Behrendt RN

## 2024-10-15 NOTE — ED NOTES
[facility], [id number], here for transport. Report given to EMTs. Pt reports he has a key to get into his apartment   03-Nov-2021

## 2024-10-16 ENCOUNTER — TELEPHONE (OUTPATIENT)
Dept: FAMILY MEDICINE | Facility: CLINIC | Age: 74
End: 2024-10-16
Payer: MEDICARE

## 2024-10-16 DIAGNOSIS — M16.4 POST-TRAUMATIC OSTEOARTHRITIS OF BOTH HIPS: Primary | ICD-10-CM

## 2024-10-16 DIAGNOSIS — G35 MULTIPLE SCLEROSIS (H): ICD-10-CM

## 2024-10-16 DIAGNOSIS — F33.2 MDD (MAJOR DEPRESSIVE DISORDER), RECURRENT SEVERE, WITHOUT PSYCHOSIS (H): ICD-10-CM

## 2024-10-16 DIAGNOSIS — R46.89 SELF NEGLECT: ICD-10-CM

## 2024-10-16 DIAGNOSIS — R45.851 SUICIDAL IDEATION: ICD-10-CM

## 2024-10-16 DIAGNOSIS — M54.50 ACUTE RIGHT-SIDED LOW BACK PAIN WITHOUT SCIATICA: ICD-10-CM

## 2024-10-16 DIAGNOSIS — G30.9 ALZHEIMER'S DISEASE (H): ICD-10-CM

## 2024-10-16 DIAGNOSIS — F02.80 ALZHEIMER'S DISEASE (H): ICD-10-CM

## 2024-10-16 NOTE — TELEPHONE ENCOUNTER
Patient's spouse called in and was given message from below. She states that the patient has fallen again and she will call [facility] to get something set up.

## 2024-10-16 NOTE — TELEPHONE ENCOUNTER
Call placed to Patient/ Wife.  Relayed Dr Sinclair's message from below.  Wife will call [facility] as they were recommended by another provider too.  Patient would like a referral to [facility].  Martin Munoz RN

## 2024-10-16 NOTE — TELEPHONE ENCOUNTER
[facility] or [facility] homecare or interim home care

## 2024-10-16 NOTE — TELEPHONE ENCOUNTER
Patient Returning Call    Reason for call:  Jeniffer (wife) spoke to [facility] and was told they are at full capacity. She needs home health care instead of hospice and is concerned if she does not get him help, they will need to move out of the indepentent living.    Information relayed to patient:  I explained to her I will have the care team/pcp contact her for another referral to [facility] home care.  Patient has additional questions:  No    What are your questions/concerns:  Needs referral for Home care accent not hospice    Could we send this information to you in [employer] or would you prefer to receive a phone call?:   Patient would prefer a phone call   Okay to leave a detailed message?: Yes at Cell number on file:    Telephone Information:   Mobile [phone]

## 2024-10-17 ENCOUNTER — MYC MEDICAL ADVICE (OUTPATIENT)
Dept: FAMILY MEDICINE | Facility: CLINIC | Age: 74
End: 2024-10-17
Payer: MEDICARE

## 2024-10-21 ENCOUNTER — MYC MEDICAL ADVICE (OUTPATIENT)
Dept: FAMILY MEDICINE | Facility: CLINIC | Age: 74
End: 2024-10-21
Payer: MEDICARE

## 2024-10-21 ENCOUNTER — TELEPHONE (OUTPATIENT)
Dept: FAMILY MEDICINE | Facility: CLINIC | Age: 74
End: 2024-10-21
Payer: MEDICARE

## 2024-10-21 DIAGNOSIS — F33.2 SEVERE EPISODE OF RECURRENT MAJOR DEPRESSIVE DISORDER, WITHOUT PSYCHOTIC FEATURES (H): Primary | ICD-10-CM

## 2024-10-21 RX ORDER — BUPROPION HYDROCHLORIDE 150 MG/1
150 TABLET ORAL EVERY MORNING
Qty: 90 TABLET | Refills: 0 | Status: SHIPPED | OUTPATIENT
Start: 2024-10-21

## 2024-10-21 NOTE — TELEPHONE ENCOUNTER
Prema from [facility] calling  Requesting order/okay to delay start of care until tomorrow    Please okay    Prema:  [phone]    Annia Alicea RN on 10/21/2024 at 12:05 PM

## 2024-10-21 NOTE — TELEPHONE ENCOUNTER
Called and got him rescheduled for Monday 10/28/2024 for 10:40 with Dr. Sinclair.      Sully Acosta on 10/21/2024 at 3:32 PM

## 2024-10-22 ENCOUNTER — TELEPHONE (OUTPATIENT)
Dept: FAMILY MEDICINE | Facility: CLINIC | Age: 74
End: 2024-10-22
Payer: MEDICARE

## 2024-10-22 NOTE — TELEPHONE ENCOUNTER
Prema RN with [facility] calling to follow up on her request from yesterday for orders to start skilled nursing and physical therapy initial evals today. They were delayed from yesterday. Verbal orders given to start today. Will update PCP.  Annia CASON RN

## 2024-10-22 NOTE — TELEPHONE ENCOUNTER
MARCE Barroso calling from [facility] to report suicidal ideation from patient during home care admission process today.    Dharmesh was at patients house today to admit him to home care around 2pm. Patient starting asking RN ways that he could die peacefully in his sleep. Asking if he could put his hands around his neck and just pass out forever. PHQ4 was scored at 17 per RN report. RN notified 911 and paramedics came to patient's home. Patient denied suicidal ideation and what RN was reporting to them. They were unable to take patient due to his denial. RN left patient's home at 5pm today and patient was with wife and stated he was okay. RN is reporting that patient is unable to be admitted to [facility] due to this and that RN recommends attempting to find another home care agency as patient is in much need of RN, PT, and OT help.    Hospice eval on 10/14/2024 and he was having suicidal ideation at that time as well. They called 911 and brought him to the hospital. They did not admit him because he didn't qualify. Patient was sent home same day.    Patient was started on Wellbutrin on Wellbutrin on 10/21/2024. Has not started yet. Wife states she is picking this up today.    Routing to PCP as FYI and to advise if addition steps are needed to take at this time.    Ewa BROWN RN  [facility]  [phone]

## 2024-10-23 NOTE — TELEPHONE ENCOUNTER
Call placed to Prema with Home Care  Relayed Dr. Sinclair's message    Prema verbalized understanding  No further questions/concerns    Tal Lai, Clinic RN  [facility]

## 2024-10-23 NOTE — TELEPHONE ENCOUNTER
"He is suicidal but not eminently suicidal.  Hospice came to see him and sent him to ER due to suicidal ideation.  He was felt to be non holdable as he was not immanently suicidal. He usually asks \"if I hold my breath before I go to bed will I die in my sleep\" Has no other suicidal plan.    Self neglect is building as he now is intentionally incontinent of stool and urine, and wife is getting caretaker burnout.     I have increased his lexapro a few weeks ago and recently added wellbutryn and ask for priority psych consult which is scheduled.  He may benefit from a  animal.  But caring for an animal may give wife further burnout.  Perhaps a volunteer who brings a therpay animal over??    I will see if care coordinator has any suggestions for a therapy animal visit orinization.   "

## 2024-10-23 NOTE — TELEPHONE ENCOUNTER
Yes he is suicidal but not immanently suicidal so not holdable.  I do thin hospice is what he wants and they should consider that again in the future.    Dez

## 2024-10-25 ENCOUNTER — PATIENT OUTREACH (OUTPATIENT)
Dept: CARE COORDINATION | Facility: CLINIC | Age: 74
End: 2024-10-25
Payer: MEDICARE

## 2024-10-25 NOTE — PROGRESS NOTES
Clinic Care Coordination Contact  Community Health Worker Initial Outreach    CHW Initial Information Gathering:  Referral Source: PCP  Preferred Hospital: [facility]  [phone]  Preferred Urgent Care: [facility], [phone]  Current living arrangement:: I live in a private home with spouse  Type of residence:: Apartment  Community Resources: None  Supplies Currently Used at Home: None  Equipment Currently Used at Home: walker, canedlaria, grab bar, toilet  Informal Support system:: Family  No PCP office visit in Past Year: No  Transportation means:: Family       Patient accepts CC: Yes. Patient scheduled for assessment with the SW on 11/15/24 at 10:00 am. Patient noted desire to discuss supports for in the home and also care giver supports. The patient's wife wants to look into supports for the patient to become more active. The CHW did send the resources listed in the Primary Care Care Coordination referral.     CODY Davis  [phone]  [facility]  [facility]

## 2024-10-28 ENCOUNTER — OFFICE VISIT (OUTPATIENT)
Dept: FAMILY MEDICINE | Facility: CLINIC | Age: 74
End: 2024-10-28
Payer: MEDICARE

## 2024-10-28 VITALS
RESPIRATION RATE: 16 BRPM | TEMPERATURE: 97.6 F | OXYGEN SATURATION: 93 % | DIASTOLIC BLOOD PRESSURE: 80 MMHG | WEIGHT: 216.8 LBS | BODY MASS INDEX: 32.11 KG/M2 | HEART RATE: 73 BPM | HEIGHT: 69 IN | SYSTOLIC BLOOD PRESSURE: 138 MMHG

## 2024-10-28 DIAGNOSIS — R33.9 URINARY RETENTION: Primary | ICD-10-CM

## 2024-10-28 DIAGNOSIS — R45.851 SUICIDAL IDEATION: ICD-10-CM

## 2024-10-28 PROCEDURE — 99214 OFFICE O/P EST MOD 30 MIN: CPT | Performed by: FAMILY MEDICINE

## 2024-10-28 ASSESSMENT — ANXIETY QUESTIONNAIRES
2. NOT BEING ABLE TO STOP OR CONTROL WORRYING: NOT AT ALL
3. WORRYING TOO MUCH ABOUT DIFFERENT THINGS: NOT AT ALL
GAD7 TOTAL SCORE: 3
6. BECOMING EASILY ANNOYED OR IRRITABLE: NOT AT ALL
7. FEELING AFRAID AS IF SOMETHING AWFUL MIGHT HAPPEN: NOT AT ALL
5. BEING SO RESTLESS THAT IT IS HARD TO SIT STILL: NOT AT ALL
GAD7 TOTAL SCORE: 3
1. FEELING NERVOUS, ANXIOUS, OR ON EDGE: NOT AT ALL

## 2024-10-28 ASSESSMENT — PATIENT HEALTH QUESTIONNAIRE - PHQ9
SUM OF ALL RESPONSES TO PHQ QUESTIONS 1-9: 13
5. POOR APPETITE OR OVEREATING: NEARLY EVERY DAY

## 2024-10-28 ASSESSMENT — PAIN SCALES - GENERAL: PAINLEVEL_OUTOF10: NO PAIN (0)

## 2024-10-29 DIAGNOSIS — R33.9 URINARY RETENTION: ICD-10-CM

## 2024-10-29 NOTE — CONSULTS
Interventional Radiology- Referral Consultation CHART REVIEW  10/29/2024      Received IR referral for prostate embolization from Dr. Sinclair.    Information sent to [facility]. Patient will be arranged for OP clinic appointment. Further orders/recommendations will be made from there.    NICOLETTE Ferrell CNP  Interventional Radiology

## 2024-11-05 ENCOUNTER — VIRTUAL VISIT (OUTPATIENT)
Dept: BEHAVIORAL HEALTH | Facility: CLINIC | Age: 74
End: 2024-11-05
Attending: FAMILY MEDICINE
Payer: MEDICARE

## 2024-11-05 ENCOUNTER — VIRTUAL VISIT (OUTPATIENT)
Dept: PSYCHIATRY | Facility: CLINIC | Age: 74
End: 2024-11-05
Attending: FAMILY MEDICINE
Payer: MEDICARE

## 2024-11-05 DIAGNOSIS — F02.80 FRONTOTEMPORAL DEMENTIA (H): ICD-10-CM

## 2024-11-05 DIAGNOSIS — G31.09 FRONTOTEMPORAL DEMENTIA (H): ICD-10-CM

## 2024-11-05 DIAGNOSIS — F32.2 CURRENT SEVERE EPISODE OF MAJOR DEPRESSIVE DISORDER WITHOUT PSYCHOTIC FEATURES WITHOUT PRIOR EPISODE (H): Primary | ICD-10-CM

## 2024-11-05 DIAGNOSIS — F43.21 ADJUSTMENT DISORDER WITH DEPRESSED MOOD: Primary | ICD-10-CM

## 2024-11-05 DIAGNOSIS — F03.93: ICD-10-CM

## 2024-11-05 PROCEDURE — 90791 PSYCH DIAGNOSTIC EVALUATION: CPT | Mod: 95 | Performed by: COUNSELOR

## 2024-11-05 PROCEDURE — 99204 OFFICE O/P NEW MOD 45 MIN: CPT | Mod: 95 | Performed by: PSYCHIATRY & NEUROLOGY

## 2024-11-05 ASSESSMENT — PATIENT HEALTH QUESTIONNAIRE - PHQ9
SUM OF ALL RESPONSES TO PHQ QUESTIONS 1-9: 10
SUM OF ALL RESPONSES TO PHQ QUESTIONS 1-9: 10
10. IF YOU CHECKED OFF ANY PROBLEMS, HOW DIFFICULT HAVE THESE PROBLEMS MADE IT FOR YOU TO DO YOUR WORK, TAKE CARE OF THINGS AT HOME, OR GET ALONG WITH OTHER PEOPLE: SOMEWHAT DIFFICULT
10. IF YOU CHECKED OFF ANY PROBLEMS, HOW DIFFICULT HAVE THESE PROBLEMS MADE IT FOR YOU TO DO YOUR WORK, TAKE CARE OF THINGS AT HOME, OR GET ALONG WITH OTHER PEOPLE: SOMEWHAT DIFFICULT
SUM OF ALL RESPONSES TO PHQ QUESTIONS 1-9: 10
SUM OF ALL RESPONSES TO PHQ QUESTIONS 1-9: 10

## 2024-11-05 ASSESSMENT — PAIN SCALES - GENERAL: PAINLEVEL_OUTOF10: NO PAIN (0)

## 2024-11-05 NOTE — PROGRESS NOTES
[facility]         PATIENT'S NAME: Iraj De Leon  PREFERRED NAME: Iraj  PRONOUNS:       MRN: [id number]  : 1950  ADDRESS: [address] [place], [place] [address]T. NUMBER:  [id number]  DATE OF SERVICE: 24  START TIME: 1245pm  END TIME: 115pm  PREFERRED PHONE: [phone]  May we leave a program related message: Yes  EMERGENCY CONTACT: was obtained Jeniffer.  SERVICE MODALITY:  Video Visit:      Provider verified identity through the following two step process.  Patient provided:  Patient  and Patient is known previously to provider    Telemedicine Visit: The patient's condition can be safely assessed and treated via synchronous audio and visual telemedicine encounter.      Reason for Telemedicine Visit: Patient has requested telehealth visit    Originating Site (Patient Location): Patient's home    Distant Site (Provider Location): Provider Remote Setting- Home Office    Consent:  The patient/guardian has verbally consented to: the potential risks and benefits of telemedicine (video visit) versus in person care; bill my insurance or make self-payment for services provided; and responsibility for payment of non-covered services.     Patient would like the video invitation sent by:  My Chart    Mode of Communication:  Video Conference via Amwell    Distant Location (Provider):  Off-site    As the provider I attest to compliance with applicable laws and regulations related to telemedicine.    First appointment with patient in CCPS and was advised of the short-term, team based structure of the model including role of [facility] and provider. Patient indicated understanding of the model and agreed to proceed with services as described. Care team has reviewed attendance agreement with patient. Patient advised that two failed appointments within 6 months may lead to termination of current episode of care.        [facility]  "DIAGNOSTIC ASSESSMENT    Identifying Information:  Patient is a 73 year old,   individual.  Patient was referred for an assessment by primary care provider.  Patient attended the session with wife .    Chief Complaint:   The reason for seeking services at this time is: \"motivation\".  The problem(s) began after hip surgery .    Patient has attempted to resolve these concerns in the past through neuroology, urology, ED visit, home health, palliative care nurse, PT .    Reason for CCPS: Pt's wife states that 2 weeks ago pt's PCP was called in with depression. Not wanting to take his medication since nothing works. PCP put pt on a new medication after ambulance and police visits. Pt and his wife are living in a independent living facility. His wife was asking for PT to help pt improve his leg strength.     Hope to: wanting resources     Social/Family History:  Patient reported they grew up in [place] [facility].  They were raised by biological parents .  Parents .  Patient reported that their childhood was \"good\".  Had one brother and pt was close to them. Patient described their current relationships with family of origin as he doesn't have any family.     The patient describes their cultural background as .  Cultural influences and impact on patient's life structure, values, norms, and healthcare: none.  Contextual influences on patient's health include: Contextual Factors: Individual Factors patient is a  73-year-old male, has no family, has dementia, only has 2 activities of enjoyment, served in the  and Family Factors does not have any family .   These factors will be addressed in the Preliminary Treatment plan. Patient identified their preferred language to be English. Patient reported they does not need the assistance of an  or other support involved in therapy.     Patient reported had no significant delays in developmental tasks.  Patient's highest education level was " college graduate .  Patient identified the following learning problems: none reported.  Modifications will not be used to assist communication in therapy.  Patient reports they are  able to understand written materials.    Patient reported the following relationship history 1x.  Patient's current relationship status is  for 36 years.   Patient identified their sexual orientation as heterosexual.  Patient reported having 0  child(merry). Patient identified partner as part of their support system.  Patient identified the quality of these relationships as good,  .      Patient's current living/housing situation involves staying in own home/apartment.  The immediate members of family and household include Jeniffer De Leon,  82,spouse and they report that housing is stable.    Patient is currently retired.  Patient reports their finances are obtained through VA benefits; other. Patient does not identify finances as a current stressor.      Patient reported that they have not been involved with the legal system.    Patient does not report being under probation/ parole/ jurisdiction. They are not under any current court jurisdiction.     Patient's Strengths and Limitations:  Patient identified the following strengths or resources that will help them succeed in treatment: commitment to health and well being and intelligence. Things that may interfere with the patient's success in treatment include:  has dementia and a hard time understanding questions at times .     Assessments:  The following assessments were completed by patient for this visit:  PHQ9:       5/16/2024     9:10 AM 10/9/2024     1:05 PM 10/28/2024     1:25 PM 11/5/2024    11:54 AM   PHQ-9 SCORE   PHQ-9 Total Score MyChart  9 (Mild depression)  10 (Moderate depression)   PHQ-9 Total Score 3 9 13 10        Patient-reported     GAD2:       11/5/2024    11:55 AM   CHARLIE-2   Feeling nervous, anxious, or on edge 1    Not being able to stop or control worrying 0     CHARLIE-2 Total Score 1        Patient-reported     GAD7:       5/16/2024     9:10 AM 10/9/2024     1:06 PM 10/28/2024     1:25 PM   CHARLIE-7 SCORE   Total Score  2 (minimal anxiety)    Total Score 0 2 3     CAGE-AID:       11/5/2024    11:53 AM   CAGE-AID Total Score   Total Score 0   Total Score MyChart 0 (A total score of 2 or greater is considered clinically significant)     PROMIS 10-Global Health (only subscores and total score):       11/2/2024    12:46 PM   PROMIS-10 Scores Only   Global Mental Health Score 8   Global Physical Health Score 11   PROMIS TOTAL - SUBSCORES 19     [place] Suicide Severity Rating Scale (Lifetime/Recent)      10/14/2024     3:42 PM 10/14/2024     6:59 PM 10/14/2024     7:00 PM 11/6/2024     9:45 AM   [place] Suicide Severity Rating (Lifetime/Recent)   Q1 Wish to be Dead (Lifetime)  Yes     Q2 Non-Specific Active Suicidal Thoughts (Lifetime)  Yes     Q1 Wished to be Dead (Past Month) 1-->yes  1-->yes    Q2 Suicidal Thoughts (Past Month) 1-->yes  1-->yes    Q3 Suicidal Thought Method 1-->yes  1-->yes    Q4 Suicidal Intent without Specific Plan   0-->no    Q5 Suicide Intent with Specific Plan 0-->no  0-->no    Q6 Suicide Behavior (Lifetime) 0-->no 0-->no     Level of Risk per Screen moderate risk  moderate risk    Q1 Wish to be Dead (Lifetime)    N   Q2 Non-Specific Active Suicidal Thoughts (Lifetime)    Y   Non-Specific Active Suicidal Thought Description (Lifetime)    Passive thoughts due to health concerns and feeling weak   3. Active Suicidal Ideation with any Methods (Not Plan) Without Intent to Act (Lifetime)  N  N   Q4 Active Suicidal Ideation with Some Intent to Act, Without Specific Plan (Lifetime)  N  N   Q5 Active Suicidal Ideation with Specific Plan and Intent (Lifetime)  N  N   Frequency (Past 1 Month)   2    Actual Attempt (Lifetime)  N  N   Actual Attempt (Past 3 Months)   N N   Has subject engaged in non-suicidal self-injurious behavior? (Lifetime)  N  N   Has subject  engaged in non-suicidal self-injurious behavior? (Past 3 Months)   N N   Interrupted Attempts (Lifetime)  N  N   Interrupted Attempts (Past 3 Months)   N N   Aborted or Self-Interrupted Attempt (Lifetime)  N  N   Aborted or Self-Interrupted Attempt (Past 3 Months)   N N   Preparatory Acts or Behavior (Lifetime)  N  N   Preparatory Acts or Behavior (Past 3 Months)   N N   Calculated C-SSRS Risk Score (Lifetime/Recent)  No Risk Indicated No Risk Indicated No Risk Indicated       Personal and Family Medical History:  Patient does not report a family history of mental health concerns.  Patient reports family history is not on file.     Patient does report Mental Health Diagnosis and/or Treatment.  Patient reported the following previous diagnoses which include(s):  none .  Patient reported symptoms began recently.  Patient has not received mental health services in the past: none.  Psychiatric Hospitalizations:  none. Patient denies a history of civil commitment.      Currently, patient    is not receiving other mental health services.  These include none.       Patient has had a physical exam to rule out medical causes for current symptoms.  Date of last physical exam was within the past year. Client was encouraged to follow up with PCP if symptoms were to develop. The patient has a [facility] Provider, who is named Dez Sinclair. Patient reports the following current medical concerns: MS and has tingling in hands and feet. .   Patient denies any issues with pain. There are not significant appetite / nutritional concerns / weight changes.   Patient does report a history of head injury / trauma / cognitive impairment. Pt has received a dx of dementia.     Patient reports current meds as:   Current Outpatient Medications   Medication Sig Dispense Refill    aspirin 81 MG EC tablet Take 1 tablet (81 mg) by mouth daily (Patient not taking: Reported on 10/28/2024)      buPROPion (WELLBUTRIN XL) 150 MG 24 hr  tablet Take 1 tablet (150 mg) by mouth every morning. 90 tablet 0    chlorthalidone (HYGROTEN) 25 MG tablet [CHLORTHALIDONE (HYGROTEN) 25 MG TABLET] Take 25 mg by mouth daily.      cholecalciferol, vitamin D3, 1,000 unit (25 mcg) tablet [CHOLECALCIFEROL, VITAMIN D3, 1,000 UNIT (25 MCG) TABLET] Take 1,000 Units by mouth daily.      escitalopram (LEXAPRO) 20 MG tablet Take 1 tablet (20 mg) by mouth daily. 90 tablet 0    lisinopril (ZESTRIL) 20 MG tablet TAKE 1 TABLET BY MOUTH EVERY DAY 90 tablet 2    multivitamin therapeutic tablet [MULTIVITAMIN THERAPEUTIC TABLET] Take 1 tablet by mouth daily.      potassium chloride jamal ER (KLOR-CON M20) 20 MEQ CR tablet TAKE 1 TABLET BY MOUTH 2 TIMES DAILY 180 tablet 2    rosuvastatin (CRESTOR) 10 MG tablet TAKE 1 TABLET BY MOUTH EVERYDAY AT BEDTIME 90 tablet 3     No current facility-administered medications for this visit.       Medication Adherence:  Patient reports taking. Wife gives it to him. He denies any issues taking the medication.       Patient Allergies:  No Known Allergies    Medical History:    Past Medical History:   Diagnosis Date    Arthritis     Cancer (H) 02/2020    basal cell Moh's forehead    Diabetes mellitus (H)     type 2, diet controlled as of March 2019    Headache 2014    Due to spontaneous spinal fluid leak    Hyperlipidemia     Hypertension     Kidney stones 2019    Multiple sclerosis (H)     Osteoarthritis     bilateral hips    Pituitary microadenoma (H)     Posterior vitreous detachment          Current Mental Status Exam:   Appearance:  Appropriate    Eye Contact:  Good   Psychomotor:  Slowed, feels weak       Gait / station:  no problem  Attitude / Demeanor: Cooperative , tried to leave the visit, wife coached him to stay for his appointment, asked if we were almost done when we were close to ending the visit  Speech      Rate / Production: Normal/ Responsive      Volume:  Normal  volume      Language:  intact  Mood:   Sad   Apathetic  Affect:   Subdued    Thought Content: Clear  at times, not sure what was asked and wife had to re-ask or fill in  Thought Process: Coherent       Associations: No loosening of associations  Insight:   Fair   Judgment:  Intact   Orientation:  All  Attention/concentration: Good    Substance Use:   Patient did not report a family history of substance use concerns; see medical history section for details. Patient has not received chemical dependency treatment in the past.  Patient has not ever been to detox.      Patient is not currently receiving any chemical dependency treatment.           Substance History of use Age of first use Date of last use     Pattern and duration of use (include amounts and frequency)   Alcohol used in the past   ? 02/22/22 REPORTS SUBSTANCE USE: N/A   Cannabis   never used     REPORTS SUBSTANCE USE: N/A     Amphetamines   never used     REPORTS SUBSTANCE USE: N/A   Cocaine/crack    never used       REPORTS SUBSTANCE USE: N/A   Hallucinogens never used         REPORTS SUBSTANCE USE: N/A   Inhalants never used         REPORTS SUBSTANCE USE: N/A   Heroin never used         REPORTS SUBSTANCE USE: N/A   Other Opiates never used     REPORTS SUBSTANCE USE: N/A   Benzodiazepine   never used     REPORTS SUBSTANCE USE: N/A   Barbiturates never used     REPORTS SUBSTANCE USE: N/A   Over the counter meds never used     REPORTS SUBSTANCE USE: N/A   Caffeine never used     REPORTS SUBSTANCE USE: N/A    Coke here and there   Nicotine  never used     REPORTS SUBSTANCE USE: N/A   Other substances not listed above:  Identify:  never used     REPORTS SUBSTANCE USE: N/A     Patient reported the following problems as a result of their substance use: no problems, not applicable.    Substance Use: No symptoms    Based on the CAGE score of 0 and clinical interview there  are not indications of drug or alcohol abuse.    Significant Losses / Trauma / Abuse / Neglect Issues:   Patient did  serve in the  [employer].  There are not indications or report of significant loss, trauma, abuse or neglect issues related to: are no indications and client denies any losses, trauma, abuse, or neglect concerns.  Concerns for possible neglect are not present.     Safety Assessment:   Patient denies current homicidal ideation and behaviors.  Patient denies current self-injurious ideation and behaviors.    Patient denied risk behaviors associated with substance use.   Patient denies any high risk behaviors associated with mental health symptoms.  Patient reports the following current concerns for their personal safety: None.  Patient reports there are not firearms in the house.         History of Safety Concerns:  Patient denied a history of homicidal ideation.     Patient denied a history of personal safety concerns.    Patient denied a history of assaultive behaviors.    Patient denied a history of sexual assault behaviors.     Patient denied a history of risk behaviors associated with substance use.  Patient denies any history of high risk behaviors associated with mental health symptoms.  Patient reports the following protective factors: access to and engagement with healthcare and responsibilities to others, sense of personal control or determination      Risk Plan:  See Recommendations for Safety and Risk Management Plan    Review of Symptoms per patient report:   Depression: Feeling sad, down, or depressed, legs are weak, puzzles and a computer game Bejeweled, talk with people in the building, don't know wife's family by name, able to do hygiene, not feeling like things will improve, anger at times- not breaking things, SI- awhile ago he thought he would be better of dead and that his body wasn't going to improve or heal, no thoughts recently, there was a nurse coming to the home for home health care and hospice nurse and he described to nurse that he didn't want to live and the nurse called and they took pt to [facility] and  sent him home and the 2nd time they didn't take him and more long term not wanting to live, have a urologist appointment and these also amplified things, the computer also stopped working which was also a factor, this morning [facility] with the [facility] to get [facility] and services   Eli:  No Symptoms no bursts of energy  Psychosis:    hear voice that is a familiar voice a family member or close friend, he isn't sure who it is  Anxiety: Excessive worry  Panic:  No symptoms  Post Traumatic Stress Disorder:  No Symptoms  no  trauma, no childhood trauma   Eating Disorder:    good, over eating   ADD / ADHD:  No symptoms  Conduct Disorder: No symptoms  Autism Spectrum Disorder: No symptoms  Obsessive Compulsive Disorder: Checking and needs thing to be in order , very patterned     Patient reports the following compulsive behaviors and treatment history:  none .      Had to do hip joint surgery which caused pain and weakness, he believes that in this surgery they removed his muscles, no shortness of breath while walking   Pt will spend time in his room and he won't socialize. He has 2 high school friends who do visit.   Sleep- doesn't sleep, he may sleep more than he thinks he does    Diagnostic Criteria:   Adjustment Disorder  A. The development of emotional or behavioral symptoms in response to an identifiable stressor(s) occurring within 3 months of the onset of the stressor(s)  B. These symptoms or behaviors are clinically significant, as evidenced by one or both of the following:       - Marked distress that is out of proportion to the severity/intensity of the stressor (with consideration for external context & culture)       - Significant impairment in social, occupational, or other important areas of functioning  C. The stress-related disturbance does not meet criteria for another disorder & is not not an exacerbation of another mental disorder  D. The symptoms do not represent normal bereavement  E. Once the  stressor or its consequences have terminated, the symptoms do not persist for more than an additional 6 months       * Adjustment Disorder with Depressed Mood: The predominant manifestations are symptoms such as low mood, tearfulness, or feelings of hopelessness    Functional Status:  Patient reports the following functional impairments:  health maintenance, organization, relationship(s), and social interactions.     Nonprogrammatic care:  Patient is requesting basic services to address current mental health concerns.    Clinical Summary:  1. Psychosocial, Cultural and Contextual Factors:  73-year-old male, has no family, has dementia, only has 2 activities of enjoyment, served in the , lives with wife in senior independent housing.  2. Principal DSM5 Diagnoses  (Sustained by DSM5 Criteria Listed Above):   Adjustment Disorders  309.0 (F43.21) With depressed mood.  3. Other Diagnoses that is relevant to services:   urine concerns, MS, hip surgery.  4. Provisional Diagnosis: Adjustment Disorders  309.0 (F43.21) With depressed mood as evidenced by  PHQ9, GAD7 and clinical interview  5. Prognosis: Expect improvement.  6. Likely consequences of symptoms if not treated: worsening MH.  7. Client strengths include:  committed to sobriety, educated, intelligent, support of family, friends and providers, and work history.     Recommendations:     1. Plan for Safety and Risk Management:   Safety and Risk: A safety and risk management plan has been developed including: Patient consented to co-developed safety plan on 10/14/2024.  Safety and risk management plan was reviewed.   Patient agreed to use safety plan should any safety concerns arise.  A copy was made available to the patient.      Report to child / adult protection services was NA.     2. Patient's identified mental health concerns with a cultural influence will be addressed by [facility] Staff .     3. Initial Treatment will focus on:    Depressed Mood -  feeling depressed, hopeless, worry about weakness in his legs due to hip surgery  Anxiety - worry about his wife and his weak legs .     4. Resources/Service Plan:    services are not indicated.   Modifications to assist communication are not indicated.   Additional disability accommodations are not indicated.      5. Collaboration:   Collaboration / coordination of treatment will be initiated with the following  support professionals: Americo Javed M.D.      6.  Referrals:   The following referral(s) will be initiated:  TBD .       A Release of Information has been obtained for the following:  N/A .     Clinical Substantiation/medical necessity for the above recommendations:  Pt has a hx of depression symptoms that are impacting daily functioning in daily living and social settings. Through receiving support through CCPS model for medication and [facility] checking on use of coping skills and therapy to help combat these symptoms may provide Pt with relief. Pt reports that they are struggling to manage depressive and again CCPS model can assist with providing coping skills, following up that pt is using these skills, safety plan or other interventions along with medication to have the best impact to manage symptoms and provide relief. At this time pt's symptoms are able to be managed with OP services and pt will be referred to a higher level of care if there are abrupt changes in presentation or risk of harm.    7. GARRETT:    GARRETT:  Discussed the general effects of drugs and alcohol on health and well-being. Provider gave patient printed information about the effects of chemical use on their health and well being. Recommendations:   continue sobriety.     8. Records:   These were reviewed at time of assessment.   Information in this assessment was obtained from the medical record and  provided by patient and his wife, pt is a fair historian.    Patient will have open access to their mental health medical  record.    9.   Interactive Complexity: No    10. Safety Plan:   [facility] Safety Plan      Creation Date: 10/14/24       Step 1: Warning signs:    Warning Signs    increased depression, isolation, cry, worry, anxiety, suicidal ideations.    stress about hip surgery, ongoing hip pain and difficulty with mobility    poor sleep, disrupted appetite, medication non-adherence.      Step 2: Internal coping strategies - Things I can do to take my mind off my problems without contacting another person:    Strategies    playing computer game, working on puzzles, watching TV, listening to music    doing leg exercise, arts and crafts, affirmations, meditation and deep breathing exercise      Step 3: People and social settings that provide distraction:    Name Contact Information    Jeniffer Joseph [phone]         Step 4: People whom I can ask for help during a crisis:    Name Contact Information    Jeniffer Joseph [phone]      Step 5: Professionals or agencies I can contact during a crisis:    Clinician/Agency Name Phone Emergency Contact    [facility] crisis line [phone]       [facility] Emergency Department Emergency Department Address Emergency Department Phone    [facility]  [phone]      Suicide Prevention Lifeline Phone: Call or Text 120  Crisis Text Line: Text HOME to 330414     Step 6: Making the environment safer (plan for lethal means safety):   Developing meaningful hobbies, daily routine and structure to stay busy.    Joining a peer support group through [facility] to increase positive support system.  [facility] ([place] on Mental Illness) improves the lives of children and adults with mental illnesses and their families by providing free classes on mental illnesses and support groups for adults with mental illnesses, parents and family members. For more information:  Phone: [phone]  Toll free: 6-536-ZZHF-MyTinks  Website:  www.namihelps.orghttp://www.namihelps.org/      Optional: What is most important to me and worth living for?:   My wife is important to me.     [place] Safety Plan. Ruthie Drake and Zach Huertas. Used with permission of the authors.           Provider Name/ Credentials:  MADELINE Bhatti, ALVARO [facility]  November 5, 2024

## 2024-11-05 NOTE — PATIENT INSTRUCTIONS
"Patient Education   Collaborative Care Psychiatry Service  What to Expect  Here's what to expect from your Collaborative Care Psychiatry Service (CCPS).   About CCPS  CCPS means 2 people work together to help you get better. You'll meet with a behavioral health clinician and a psychiatric doctor. A behavioral health clinician helps people with mental health problems by talking with them. A psychiatric doctor helps people by giving them medicine.  How it works  At every visit, you'll see the behavioral health clinician (BHC) first. They'll talk with you about how you're doing and teach you how to feel better.   Then you'll see the psychiatric doctor. This doctor can help you deal with troubling thoughts and feelings by giving you medicine. They'll make sure you know the plan for your care.   CCPS usually takes 3 to 6 visits. If you need more visits, we may have you start seeing a different psychiatric doctor for ongoing care.  If you have any questions or concerns, we'll be glad to talk with you.  About visits  Be open  At your visits, please talk openly about your problems. It may feel hard, but it's the best way for us to help you.  Cancelling visits  If you can't come to your visit, please call us right away at [phone]. If you don't cancel at least 24 hours (1 full day) before your visit, that's \"late cancellation.\"  Being late to visits  Being very late is the same as not showing up. You will be a \"no show\" if:  Your appointment starts with a BHC, and you're more than 15 minutes late for a 30-minute (half hour) visit. This will also cancel your appointment with the psychiatric doctor.  Your appointment is with a psychiatric doctor only, and you're more than 15 minutes late for a 30-minute (half hour) visit.  Your appointment is with a psychiatric doctor only, and you're more than 30 minutes late for a 60-minute (full hour) visit.  If you cancel late or don't show up 2 times within 6 months, we may end your " care.   Getting help between visits  If you need help between visits, you can call us Monday to Friday from 8 a.m. to 4:30 p.m. at [phone].  Emergency care  Call 911 or go to the nearest emergency department if your life or someone else's life is in danger.  Call 988 anytime to reach the national Suicide and Crisis hotline.  Medicine refills  To refill your medicine, call your pharmacy. You can also call [facility]'s Behavioral Access at [phone], Monday to Friday, 8 a.m. to 4:30 p.m. It can take 1 to 3 business days to get a refill.   Forms, letters, and tests  You may have papers to fill out, like FMLA, short-term disability, and workability. We can help you with these forms at your visits, but you must have an appointment. You may need more than 1 visit for this, to be in an intensive therapy program, or both.  Before we can give you medicine for ADHD, we may refer you to get tested for it or confirm it another way.  We may not be able to give you an emotional support animal letter.  We don't do mental health checks ordered by the court.   We don't do mental health testing, but we can refer you to get tested.   Thank you for choosing us for your care.  For informational purposes only. Not to replace the advice of your health care provider. Copyright   2022 [facility]. All rights reserved. Azuro [id number] - 12/22.

## 2024-11-05 NOTE — PROGRESS NOTES
Virtual Visit Details    Type of service:  Video Visit   Video Start Time: 1:28 PM  Video End Time:1:53 PM    Originating Location (pt. Location): Home    Distant Location (provider location):  Off-site  Platform used for Video Visit: [facility] Psychiatry Intake      IDENTIFICATION   Name: Iraj De Leon   : 1950/73 year old      Sex:    @ male          Telemedicine Visit: The patient's condition can be safely assessed and treated via synchronous audio and visual telemedicine encounter.      Face to Face/patient Contact total time: 25 minutes  Pre Charting time: 7 minutes; Post charting time, communication and other activities 12 minutes; Total time 44 minutes  1:03 PM     CHIEF COMPLAINT   Source of Referral:    Primary Care Provider:   Dez Sinclair             HISTORY OF PRESENT ILLNESS     History of Present Illness    Iraj De Leon, a 73-year-old male, has been experiencing severe episodes of recurrent major depressive disorder. The onset of his depressive symptoms appears to have been approximately a year ago, coinciding with a significant life change when he moved from a large home where he was very active and engaged in [employer], to a long term community. This move was necessitated by a decline in his memory and the need to stop driving.    Iraj's depressive symptoms have been characterized by a sense of hopelessness and a loss of will to continue living. His wife, Jeniffer, reported that he has been feeling that his quality of life has significantly diminished since the move. He has also been struggling with physical health issues, including incontinence and a perceived loss of muscle function in his legs following hip surgery. This has led to a belief that his muscles were literally removed during the surgery, causing him to feel that his legs have no purpose.    In recent times, there has been a slight improvement in his condition. He has started to engage in physical activity,  such as going to the gym and walking the halls of their residence, which he had not done for months. This change in behavior is believed to be a result of his current medication regimen, which seems to have lifted him out of his depressive state to some extent and given him a sense of purpose.    Iraj's current medication regimen includes wellbutrin and lexapro. There have been no reported side effects from these medications. His wife has expressed a preference to maintain the current dosage levels to see how his condition progresses before considering any increases.    In addition to his depressive symptoms, Iraj has been diagnosed with interlobal dementia and multiple sclerosis (MS). His wife has expressed frustration with the medical system, particularly with the requirement for repeated neurological tests, which Iraj finds agitating. She is considering seeking assistance from the [facility] system, as Iraj is a disabled  with MS.    Despite his health challenges, Iraj's wife reported that he does not seem to exhibit significant anxiety. He spends his days contentedly doing puzzles and games, and has recently shown some improvement in social interactions. His wife would like to see him engage more socially, but acknowledges that opportunities for this are limited in their current living environment.    In summary, Iraj's current health status is characterized by severe depressive symptoms, cognitive decline, and physical health issues. His condition has shown some improvement with his current medication regimen, and his wife is hopeful that this trend will continue.            Vital Signs:   There were no vitals taken for this visit.       No data to display                         The following assessments were completed by patient for this visit:  PROMIS 10-Global Health (only subscores and total score):       11/2/2024    12:46 PM   PROMIS-10 Scores Only   Global Mental Health Score 8    Global Physical Health Score 11   PROMIS TOTAL - SUBSCORES 19           10/9/2024     1:05 PM 10/28/2024     1:25 PM 11/5/2024    11:54 AM   PHQ   PHQ-9 Total Score 9 13 10    Q9: Thoughts of better off dead/self-harm past 2 weeks Several days  Nearly every day Several days    F/U: Thoughts of suicide or self-harm No   No    F/U: Safety concerns No   No        Patient-reported          5/16/2024     9:10 AM 10/9/2024     1:06 PM 10/28/2024     1:25 PM   CHARLIE-7 SCORE   Total Score  2 (minimal anxiety)    Total Score 0 2 3         Results                  REVIEW OF SYSTEMS:   No history of cardiovascular disease, mildly prolonged QT is oh, no history of respiratory disease, no history of seizures      PAST PSYCHIATRIC HISTORY:   Depression onset a year ago  Med Trials:  Lexapro  Wellbutrin  Self-Directed Violence: None      PAST MEDICAL HISTORY:     Past Medical History:   Diagnosis Date    Arthritis     Cancer (H) 02/2020    basal cell Moh's forehead    Diabetes mellitus (H)     type 2, diet controlled as of March 2019    Headache 2014    Due to spontaneous spinal fluid leak    Hyperlipidemia     Hypertension     Kidney stones 2019    Multiple sclerosis (H)     Osteoarthritis     bilateral hips    Pituitary microadenoma (H)     Posterior vitreous detachment       has a past medical history of Arthritis, Cancer (H) (02/2020), Diabetes mellitus (H), Headache (2014), Hyperlipidemia, Hypertension, Kidney stones (2019), Multiple sclerosis (H), Osteoarthritis, Pituitary microadenoma (H), and Posterior vitreous detachment.    Current medications include:   Current Outpatient Medications   Medication Sig Dispense Refill    aspirin 81 MG EC tablet Take 1 tablet (81 mg) by mouth daily (Patient not taking: Reported on 10/28/2024)      buPROPion (WELLBUTRIN XL) 150 MG 24 hr tablet Take 1 tablet (150 mg) by mouth every morning. 90 tablet 0    chlorthalidone (HYGROTEN) 25 MG tablet [CHLORTHALIDONE (HYGROTEN) 25 MG TABLET] Take 25  mg by mouth daily.      cholecalciferol, vitamin D3, 1,000 unit (25 mcg) tablet [CHOLECALCIFEROL, VITAMIN D3, 1,000 UNIT (25 MCG) TABLET] Take 1,000 Units by mouth daily.      escitalopram (LEXAPRO) 20 MG tablet Take 1 tablet (20 mg) by mouth daily. 90 tablet 0    lisinopril (ZESTRIL) 20 MG tablet TAKE 1 TABLET BY MOUTH EVERY DAY 90 tablet 2    multivitamin therapeutic tablet [MULTIVITAMIN THERAPEUTIC TABLET] Take 1 tablet by mouth daily.      potassium chloride jamal ER (KLOR-CON M20) 20 MEQ CR tablet TAKE 1 TABLET BY MOUTH 2 TIMES DAILY 180 tablet 2    rosuvastatin (CRESTOR) 10 MG tablet TAKE 1 TABLET BY MOUTH EVERYDAY AT BEDTIME 90 tablet 3     No current facility-administered medications for this visit.         FAMILY HISTORY:   unremarkable    SOCIAL HISTORY:   The patient moved into a snf community from a four-bedroom home about a year ago. He used to be very active, doing handiwork and woodworking. However, his health has been declining, and he had to quit driving. He has been socially isolated and spends most of his time doing puzzles and games. He has recently started going to the gym in the building and walking the halls. Iraj is a disabled  with MS and is considering seeking assistance from the [facility] medical system.    History of hip surgery    Substance Use History:  No active concerns, noted history of nicotine.    MENTAL STATUS EXAMINATION:   Appearance: Intact attention to grooming and hygiene  Attitude: Cooperative good  Eye Contact: Fair  Gait and Station: Sitting  Psychomotor Behavior: Relatively reduced  Oriented to: Person, place and date-he knew the month, year; for the date he may have looked at his watch  Attention Span and Concentration: Grossly fair  Speech: Relatively reduced  Language: English  Mood:   Fair  Affect: Restricted  Associations:  no loose associations  Thought Process:  logical, linear and goal oriented  Thought Content: No evidence of delusions or suicidal  "or homicidal ideation plan or intent  Memory: Grossly fair  Fund of Knowledge: fair  Insight:  fair  Judgment:  intact, adequate for safety  Impulse Control:  intact    Physical Exam    Physical exam  HEENT: No signs of eye problems or hearing problems were reported.  CARDIOVASCULAR: No signs of heart problems were reported.  LUNGS: No signs of breathing problems were reported.  ABDOMEN: No signs of gastrointestinal problems were reported.  SKIN: No signs of skin problems were reported.  NEUROLOGIC: Patient was able to repeat three objects (apple, table, pencil) after being told them. Patient was able to correctly identify the month and year, but had difficulty with the exact date. Patient was able to spell the word \"world\" backwards, although with some difficulty.  GENITOURINARY: Patient reported some issues with urination.    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 This has been primarily manifested through social isolation and a decrease in quality of life in the context of dementia and history of multiple sclerosis.     Today Iraj De Leon reports no suicidal ideation or intent. In addition, he has notable risk factors for self-harm, including comorbid medical condition of dementia, age. However, risk is mitigated by commitment to family, absence of past attempts, history of seeking help when needed, and support from wife. Therefore, based on all available evidence including the factors cited above, he does not appear to " be at imminent risk for self-harm, does not meet criteria for a 72-hr hold, and therefore remains appropriate for ongoing outpatient level of care.       PLAN:     Patient advised of consultative model. Patient will continue to be seen for ongoing consultation and stabilization.  Does not meet criteria for involuntary treatment or hospitalization  Continue Wellbutrin  mg daily-Risks, benefits and alternatives discussed.  Patient provides verbal consent to treatment.  Continue Lexapro 20 mg daily  Labz-reviewed, no further labs indicated at this time          Plan  - Continue with current medications (wellbutrin 150mg and lexapro) and monitor progress.  - Schedule a follow-up consultation in eight weeks.  - Consider titrating the dosage of wellbutrin if symptomatology does not ameliorate.    Prescription  Continue with current medications (wellbutrin 150mg and lexapro)    Appointments  Follow-up consultation in eight weeks             Administrative Billing:   Time spent with patient was greater than 50% of time and/or significant time was spent in counseling and coordination of care regarding above diagnoses and treatment plan. Pre charting time and post charting time/documentation/coordination are done on date of service.     Signed:   Americo Javed M.D.  [facility] Psychiatry Service    Disclaimer: This note consists of symbols derived from keyboarding, dictation and/or voice recognition software. As a result, there may be errors in the script that have gone undetected. Please consider this when interpreting information found in this chart.    Consent was obtained from the patient to use an AI documentation tool in the creation of this note.     episodes of care

## 2024-11-05 NOTE — Clinical Note
Dr. Sinclair,  Thank you for consulting care of the patient. Current medications have started to help and will maintain.   Sincerely, Americo Javed M.D. Consultative Psychiatrist Program Medical Director, Lead Collaborative Care Psychiatry Service

## 2024-11-05 NOTE — NURSING NOTE
Current patient location: [address] [place] [address] [address]  [address] [place] [address]    Is the patient currently in the state of [place]? YES    Visit mode:VIDEO    If the visit is dropped, the patient can be reconnected by: VIDEO VISIT: Text to cell phone:   Telephone Information:   Mobile [phone]    and VIDEO VISIT: Send to e-mail at: [email or web address]    Will anyone else be joining the visit? NO  (If patient encounters technical issues they should call [phone])    Are changes needed to the allergy or medication list? Pt stated no changes to allergies and Pt stated no med changes    Are refills needed on medications prescribed by this physician? NO    Rooming Documentation:  Questionnaire(s) completed Attendance guidelines (MH&A only)    Reason for visit: Consult    [facility]    Care team has reviewed attendance agreement with patient. Patient advised that two failed appointments within 6 months may lead to termination of current episode of care.

## 2024-11-06 ENCOUNTER — TRANSFERRED RECORDS (OUTPATIENT)
Dept: HEALTH INFORMATION MANAGEMENT | Facility: CLINIC | Age: 74
End: 2024-11-06
Payer: MEDICARE

## 2024-11-06 ASSESSMENT — COLUMBIA-SUICIDE SEVERITY RATING SCALE - C-SSRS
TOTAL  NUMBER OF ABORTED OR SELF INTERRUPTED ATTEMPTS PAST 3 MONTHS: NO
3. HAVE YOU BEEN THINKING ABOUT HOW YOU MIGHT KILL YOURSELF?: NO
1. HAVE YOU WISHED YOU WERE DEAD OR WISHED YOU COULD GO TO SLEEP AND NOT WAKE UP?: NO
4. HAVE YOU HAD THESE THOUGHTS AND HAD SOME INTENTION OF ACTING ON THEM?: NO
6. HAVE YOU EVER DONE ANYTHING, STARTED TO DO ANYTHING, OR PREPARED TO DO ANYTHING TO END YOUR LIFE?: NO
ATTEMPT LIFETIME: NO
TOTAL  NUMBER OF INTERRUPTED ATTEMPTS PAST 3 MONTHS: NO
5. HAVE YOU STARTED TO WORK OUT OR WORKED OUT THE DETAILS OF HOW TO KILL YOURSELF? DO YOU INTEND TO CARRY OUT THIS PLAN?: NO
2. HAVE YOU ACTUALLY HAD ANY THOUGHTS OF KILLING YOURSELF?: YES
TOTAL  NUMBER OF ABORTED OR SELF INTERRUPTED ATTEMPTS LIFETIME: NO
ATTEMPT PAST THREE MONTHS: NO
6. HAVE YOU EVER DONE ANYTHING, STARTED TO DO ANYTHING, OR PREPARED TO DO ANYTHING TO END YOUR LIFE?: NO
TOTAL  NUMBER OF INTERRUPTED ATTEMPTS LIFETIME: NO

## 2024-11-08 ENCOUNTER — TELEPHONE (OUTPATIENT)
Dept: PSYCHIATRY | Facility: CLINIC | Age: 74
End: 2024-11-08
Payer: MEDICARE

## 2024-11-08 NOTE — TELEPHONE ENCOUNTER
"Reason for call:  Other     Patient called regarding (reason for call): appointment and call back    Additional comments: Pts wife and POA Jeniffer De Leon stated she got a letter saying that the pt missed his appointment on 11/5/2024 with Dr. Javed. On the Appt desk the appt is listed as \"left\" implying the pt arrived and did some of the appt, and she is stating he completed the entire appt. Writer explained that sometimes if the provider is behind on paperwork the appt status does not show properly. Pts wife would like a call back confirming that the appt will be listed as completed and this won't count as a no show.        Phone number to reach patient:  Home number on file [phone] (home)    Best Time:  ANY    Can we leave a detailed message on this number?  YES    Travel screening: Not Applicable    "

## 2024-11-12 NOTE — TELEPHONE ENCOUNTER
Called wife and she reports a Nov 2022 appointment, advised this would not affect his current care- [facility]. She thought this was in relation to the [facility]. Was advised this would not affect no show policy as this is a glitch. Was advised of followup with Arcelia

## 2024-11-12 NOTE — TELEPHONE ENCOUNTER
This has been an issue - perhaps with the last Epic update. There is an IS ticket to fix this, but this would not count as a no show for us, and the issue is being escalated.

## 2024-11-15 ENCOUNTER — MYC MEDICAL ADVICE (OUTPATIENT)
Dept: CARE COORDINATION | Facility: CLINIC | Age: 74
End: 2024-11-15

## 2024-11-15 ENCOUNTER — PATIENT OUTREACH (OUTPATIENT)
Dept: NURSING | Facility: CLINIC | Age: 74
End: 2024-11-15
Payer: MEDICARE

## 2024-11-15 ENCOUNTER — TELEPHONE (OUTPATIENT)
Dept: CARE COORDINATION | Facility: CLINIC | Age: 74
End: 2024-11-15

## 2024-11-15 DIAGNOSIS — N40.1 BENIGN PROSTATIC HYPERPLASIA WITH URINARY RETENTION: Primary | ICD-10-CM

## 2024-11-15 DIAGNOSIS — R33.8 BENIGN PROSTATIC HYPERPLASIA WITH URINARY RETENTION: Primary | ICD-10-CM

## 2024-11-15 ASSESSMENT — ACTIVITIES OF DAILY LIVING (ADL)
DEPENDENT_IADLS:: CLEANING;COOKING;LAUNDRY;SHOPPING;MEAL PREPARATION;MEDICATION MANAGEMENT;MONEY MANAGEMENT;TRANSPORTATION;INCONTINENCE

## 2024-11-15 NOTE — PROGRESS NOTES
Clinic Care Coordination Contact  Clinic Care Coordination Contact  OUTREACH    Referral Information:  Referral Source: PCP    Primary Diagnosis: Neurological Disorders    Chief Complaint   Patient presents with    Clinic Care Coordination - Initial        Universal Utilization:   Clinic Utilization  Difficulty keeping appointments:: No  Compliance Concerns: Yes  No-Show Concerns: No  No PCP office visit in Past Year: No  Utilization      No Show Count (past year)  0             ED Visits  1             Hospital Admissions  0                    Current as of: 11/15/2024 10:39 AM                Clinical Concerns:  Current Medical Concerns:    Patient Active Problem List   Diagnosis    Degenerative joint disease (DJD) of hip    Essential hypertension    Type 2 diabetes mellitus without complication, without long-term current use of insulin (H)    Screening for AAA (abdominal aortic aneurysm)    Acute right-sided low back pain without sciatica    Calcium oxalate monohydrate kidney stones    Chronic left shoulder pain    CSF leak    Diuretic-induced hypokalemia    Encephalomalacia on imaging study    Family history of stomach cancer    History of actinic keratoses    History of tobacco use    Hyperoxaluria    Mixed hyperlipidemia    Multiple sclerosis (H)    Need for hepatitis C screening test    Onychomycosis    Other seborrheic keratosis    Sleep disturbance    Snoring    Impaired cognition    Paresis of lower extremity (H)    Alzheimer's disease (H)    Anxiety    MDD (major depressive disorder), recurrent severe, without psychosis (H)    CHARLIE (generalized anxiety disorder)        Current Behavioral Concerns: no current concerns      Education Provided to patient:     Home Care: I have reached out to Dr. Sinclair to request a new physical therapy home care referral. This order will be placed OR discussed further at Iraj's 11/25/24 appointment. The clinic might reach out to you with any questions or needs.      Respite:     [place]  :     [facility] Friendly [place] - [email or web address]   [facility] friendly helpers are trained and provide assistance for members, age 65 years and older, through their supplemental benefits.   [phone]     [facility] Neighbor to Neighbor  - [email or web address]   Our dedicated, trained Neighbor to Neighbor  volunteers offer assistance with daily activities, transportation for appointments, and an opportunity for meaningful friendship. This service is offered and available for purchase statewide.   [phone]     [facility] Respite Care - [email or web address]   [facility] Caregiver Support & Respite Services provides a few hours of support for an older adult (60 years of age and older) in their own home each week so family members can take some personal time.    Em Coronel   [phone]      [facility]:     [facility] In-Home Respite - [email or web address]   Regularly scheduled visits are provided at your home by volunteers who are screened, trained and carefully matched and monitored to meet your family's needs. It is typical for the volunteer to come weekly for a maximum of 4 hours respite. Lengths of respite visits will be determined by the caregiver and Caregiving & Aging .   [phone]     [facility] AIM also offers caregiver consultation to help coordinate services, solve problems, and plan ahead. Make an appointment by calling [phone]. They offer support groups, empowerment services and more : [email or web address]     Transportation:     Transit Link- [phone] May only be used for routes outside of the normal metro transit lines  Curb to curb transportation  May reserve a ride up to five days in advance  Base fare is $2.25  Any  type of ride      [employer] - [phone] must be approved first. Call for an application.  Shared public transportation.  For individuals with disabilities as certified by [employer].  Reservations taken 7 days a week.  Call in advance, up to four days.  Rides are for any purpose.    Help at Your Door [phone]   Grocery shopping for seniors and individuals with disabilities/$5 - $15 fee depending on income.   services for seniors - $35/hr minimum 2 hrs.  Transportation for seniors - $35/trip up to 20 miles round trip - $1.90 each additional mile.     Go Go Grandparent - [phone] must register online or by phone before first use  Similar to Lyft, Uber. Do not need to use a smart phone  Need a credit card, $2.70 fee per ride plus $1 per mile and $.34 per minute  Can accommodate walkers, door to door  Need to give 15 minutes notice  Can have live texts sent to friend, family on status of ride     Pain  Pain (GOAL):: No  Health Maintenance Reviewed: Due/Overdue   Health Maintenance Due   Topic Date Due    DEPRESSION ACTION PLAN  Never done    LUNG CANCER SCREENING  Never done    A1C  11/16/2024      Clinical Pathway: None    Medication Management:  Medication review status: Medications reviewed and no changes reported per patient.             Functional Status:  Dependent ADLs:: Ambulation-walker, Dressing, Grooming, Incontinence  Dependent IADLs:: Cleaning, Cooking, Laundry, Shopping, Meal Preparation, Medication Management, Money Management, Transportation, Incontinence  Bed or wheelchair confined:: No  Mobility Status: Dependent/Assisted by Another  Fallen 2 or more times in the past year?: (!) Yes  Any fall with injury in the past year?: No    Living Situation:  Current living arrangement:: Other  Type of residence:: Independent Senior Living    Lifestyle & Psychosocial Needs:    Social Drivers of Health     Food Insecurity: Low Risk  (10/26/2024)    Food Insecurity     Within the past 12  months, did you worry that your food would run out before you got money to buy more?: No     Within the past 12 months, did the food you bought just not last and you didn t have money to get more?: No   Depression: Not at risk (11/5/2024)    PHQ-2     PHQ-2 Score: 2   Housing Stability: Low Risk  (10/26/2024)    Housing Stability     Do you have housing? : Yes     Are you worried about losing your housing?: No   Tobacco Use: Medium Risk (11/5/2024)    Patient History     Smoking Tobacco Use: Former     Smokeless Tobacco Use: Never     Passive Exposure: Not on file   Financial Resource Strain: Low Risk  (10/26/2024)    Financial Resource Strain     Within the past 12 months, have you or your family members you live with been unable to get utilities (heat, electricity) when it was really needed?: No   Alcohol Use: Not At Risk (10/26/2024)    AUDIT-C     Frequency of Alcohol Consumption: Never     Average Number of Drinks: Patient does not drink     Frequency of Binge Drinking: Never   Transportation Needs: Low Risk  (10/26/2024)    Transportation Needs     Within the past 12 months, has lack of transportation kept you from medical appointments, getting your medicines, non-medical meetings or appointments, work, or from getting things that you need?: No   Physical Activity: Inactive (10/26/2024)    Exercise Vital Sign     Days of Exercise per Week: 0 days     Minutes of Exercise per Session: 0 min   Interpersonal Safety: Not At Risk (5/22/2019)    Received from [facility], [facility]    Humiliation, Afraid, Rape, and Kick questionnaire     Fear of Current or Ex-Partner: No     Emotionally Abused: No     Physically Abused: No     Sexually Abused: No   Stress: Stress Concern Present (10/26/2024)    [place] [employer] - Occupational Stress Questionnaire     Feeling of Stress : To some extent   Social Connections: Moderately Isolated  (10/26/2024)    Social Connection and Isolation Panel [NHANES]     Frequency of Communication with Friends and Family: Never     Frequency of Social Gatherings with Friends and Family: Never     Attends Confucianist Services: Never     Active Member of Clubs or Organizations: Yes     Attends Club or Organization Meetings: Never     Marital Status:    Health Literacy: Not on file     Diet:: Regular  Inadequate nutrition (GOAL):: No  Tube Feeding: No  Inadequate activity/exercise (GOAL):: Yes  Significant changes in sleep pattern (GOAL): No  Transportation means:: Family     Confucianist or spiritual beliefs that impact treatment:: No  Chemical Dependency Status: No Current Concerns  Informal Support system:: Spouse             Resources and Interventions:  Current Resources:      Community Resources: None  Supplies Currently Used at Home: Incontinence Supplies  Equipment Currently Used at Home: grab bar, toilet, grab bar, tub/shower, shower chair, walker, standard  Employment Status: retired         Advance Care Plan/Directive  Advanced Care Plans/Directives on file:: Yes    Referrals Placed: Transportation, Home Care         Care Plan:  Care Plan: Help At Home       Problem: Insufficient In-home support       Goal: Establish adequate home support       Start Date: 11/15/2024    Note:     Goal Statement: I will continue to take steps over the next three month(s) to identify and establish with in home/community supports which will allow me to maintain my current level of independence.  Barriers: ability to engage in services, location   Strengths: family support  Patient expressed understanding of goal: yes    Action steps to achieve this goal:  I will review resources and supports sent to me via [employer] : transportation, respite (LSS, Family Means)  I will outreach to supports and consider establishing with ones I might find beneficial.  I will work with my primary care provider to establish with PT home care.  I  will continue to outreach to care coordination as needed for additional resources or supports.                                Patient/Caregiver understanding: Pt reports understanding and denies any additional questions or concerns at this times. SW CC engaged in [employer] communication during encounter.     Outreach Frequency: monthly, more frequently as needed  Future Appointments                In 1 week Dez Sinclair MD M [facility] [place], SONIA    In 1 month Adriane Veliz LICSW M [facility] Mental [facility], [facility] L    In 1 month Americo Javed MD [facility], [facility] L    In 2 months Dez Sinclair MD [facility] [place], SONIA    In 2 months Bernadine Aranda Psy.D, ANDRADE [facility] Neurology [facility], [facility] WBWW    In 2 months Imani Bello MD [facility] Neurology [facility] MPLW            Plan: [facility] completed handoff to [facility]. SWCC provided resources via phone and my chart. [facility] will complete outreach in about 4 week. SWCC will complete chart review in about 6 weeks. CC reviewed care coordination role and availability with pt's spouse Jeniffer. SWCC discussed resources and supports available. Resources discussed: home care, respite, transportation.     Home Care: Jeniffer noted that pt has been discharged from home care x2 due to comments he made regarding not wanting to live; these were passive comments. Pt is now on medication and Jeniffer would like to see if Pt is able to have home care as she feels he has improved - CC sent request to PCP.     Respite: Jeniffer shared that it is very hard to go out and do the things she needs to do or even have a break. She does not have support to help with the pt so she is not able to leave him alone. [facility] provided resources for respite care which Jeniffer will review.      Transportation: Jeniffer does not drive in the winter, or if she does it is only a few blocks. She accepted resources for transportation which she will look into.

## 2024-11-15 NOTE — TELEPHONE ENCOUNTER
What is request home care - specifically PT    Why the request Jeniffer would like PT for patient as she feels he has improved his mood and outlook with medication; would be able to be successful with PT at this point in time.     When is request needed ASAP or address at 11/25 appointment with PCP if in person visit is needed again.     Where does request need to go PCP; Dr. Dottie Henderson to leave detailed message yes

## 2024-11-16 ENCOUNTER — TELEPHONE (OUTPATIENT)
Dept: FAMILY MEDICINE | Facility: CLINIC | Age: 74
End: 2024-11-16
Payer: MEDICARE

## 2024-11-16 DIAGNOSIS — N40.1 BENIGN PROSTATIC HYPERPLASIA WITH URINARY RETENTION: Primary | ICD-10-CM

## 2024-11-16 DIAGNOSIS — R33.8 BENIGN PROSTATIC HYPERPLASIA WITH URINARY RETENTION: Primary | ICD-10-CM

## 2024-11-18 ENCOUNTER — MYC MEDICAL ADVICE (OUTPATIENT)
Dept: FAMILY MEDICINE | Facility: CLINIC | Age: 74
End: 2024-11-18
Payer: MEDICARE

## 2024-11-18 DIAGNOSIS — N40.0 BPH (BENIGN PROSTATIC HYPERPLASIA): Primary | ICD-10-CM

## 2024-11-18 DIAGNOSIS — R19.7 DIARRHEA, UNSPECIFIED TYPE: Primary | ICD-10-CM

## 2024-11-19 ENCOUNTER — LAB (OUTPATIENT)
Dept: LAB | Facility: CLINIC | Age: 74
End: 2024-11-19
Payer: MEDICARE

## 2024-11-19 DIAGNOSIS — R19.7 DIARRHEA, UNSPECIFIED TYPE: ICD-10-CM

## 2024-11-19 LAB
BASOPHILS # BLD AUTO: 0 10E3/UL (ref 0–0.2)
BASOPHILS NFR BLD AUTO: 0 %
EOSINOPHIL # BLD AUTO: 0.1 10E3/UL (ref 0–0.7)
EOSINOPHIL NFR BLD AUTO: 1 %
ERYTHROCYTE [DISTWIDTH] IN BLOOD BY AUTOMATED COUNT: 12.8 % (ref 10–15)
HCT VFR BLD AUTO: 48.6 % (ref 40–53)
HGB BLD-MCNC: 16.2 G/DL (ref 13.3–17.7)
IMM GRANULOCYTES # BLD: 0 10E3/UL
IMM GRANULOCYTES NFR BLD: 0 %
LYMPHOCYTES # BLD AUTO: 1.4 10E3/UL (ref 0.8–5.3)
LYMPHOCYTES NFR BLD AUTO: 26 %
MCH RBC QN AUTO: 29.8 PG (ref 26.5–33)
MCHC RBC AUTO-ENTMCNC: 33.3 G/DL (ref 31.5–36.5)
MCV RBC AUTO: 89 FL (ref 78–100)
MONOCYTES # BLD AUTO: 0.6 10E3/UL (ref 0–1.3)
MONOCYTES NFR BLD AUTO: 11 %
NEUTROPHILS # BLD AUTO: 3.2 10E3/UL (ref 1.6–8.3)
NEUTROPHILS NFR BLD AUTO: 61 %
PLATELET # BLD AUTO: 225 10E3/UL (ref 150–450)
RBC # BLD AUTO: 5.44 10E6/UL (ref 4.4–5.9)
WBC # BLD AUTO: 5.2 10E3/UL (ref 4–11)

## 2024-11-19 PROCEDURE — 85025 COMPLETE CBC W/AUTO DIFF WBC: CPT

## 2024-11-19 PROCEDURE — 36415 COLL VENOUS BLD VENIPUNCTURE: CPT

## 2024-11-25 ENCOUNTER — MYC MEDICAL ADVICE (OUTPATIENT)
Dept: FAMILY MEDICINE | Facility: CLINIC | Age: 74
End: 2024-11-25

## 2024-11-25 ENCOUNTER — OFFICE VISIT (OUTPATIENT)
Dept: FAMILY MEDICINE | Facility: CLINIC | Age: 74
End: 2024-11-25
Payer: MEDICARE

## 2024-11-25 VITALS
TEMPERATURE: 97.4 F | HEART RATE: 81 BPM | RESPIRATION RATE: 14 BRPM | DIASTOLIC BLOOD PRESSURE: 88 MMHG | WEIGHT: 219.2 LBS | SYSTOLIC BLOOD PRESSURE: 138 MMHG | OXYGEN SATURATION: 92 % | BODY MASS INDEX: 32.47 KG/M2 | HEIGHT: 69 IN

## 2024-11-25 DIAGNOSIS — E87.6 HYPOKALEMIA: ICD-10-CM

## 2024-11-25 DIAGNOSIS — E11.9 TYPE 2 DIABETES MELLITUS WITHOUT COMPLICATION, WITHOUT LONG-TERM CURRENT USE OF INSULIN (H): ICD-10-CM

## 2024-11-25 DIAGNOSIS — F33.2 SEVERE EPISODE OF RECURRENT MAJOR DEPRESSIVE DISORDER, WITHOUT PSYCHOTIC FEATURES (H): ICD-10-CM

## 2024-11-25 DIAGNOSIS — F41.9 ANXIETY: ICD-10-CM

## 2024-11-25 LAB
ALBUMIN SERPL BCG-MCNC: 4.5 G/DL (ref 3.5–5.2)
ALP SERPL-CCNC: 109 U/L (ref 40–150)
ALT SERPL W P-5'-P-CCNC: 24 U/L (ref 0–70)
ANION GAP SERPL CALCULATED.3IONS-SCNC: 8 MMOL/L (ref 7–15)
AST SERPL W P-5'-P-CCNC: 19 U/L (ref 0–45)
BILIRUB SERPL-MCNC: 0.4 MG/DL
BUN SERPL-MCNC: 11.5 MG/DL (ref 8–23)
CALCIUM SERPL-MCNC: 9.6 MG/DL (ref 8.8–10.4)
CHLORIDE SERPL-SCNC: 98 MMOL/L (ref 98–107)
CREAT SERPL-MCNC: 0.9 MG/DL (ref 0.67–1.17)
EGFRCR SERPLBLD CKD-EPI 2021: 90 ML/MIN/1.73M2
EST. AVERAGE GLUCOSE BLD GHB EST-MCNC: 166 MG/DL
GLUCOSE SERPL-MCNC: 125 MG/DL (ref 70–99)
HBA1C MFR BLD: 7.4 % (ref 0–5.6)
HCO3 SERPL-SCNC: 36 MMOL/L (ref 22–29)
POTASSIUM SERPL-SCNC: 3.8 MMOL/L (ref 3.4–5.3)
PROT SERPL-MCNC: 7 G/DL (ref 6.4–8.3)
SODIUM SERPL-SCNC: 142 MMOL/L (ref 135–145)

## 2024-11-25 PROCEDURE — 80053 COMPREHEN METABOLIC PANEL: CPT | Performed by: FAMILY MEDICINE

## 2024-11-25 PROCEDURE — 99214 OFFICE O/P EST MOD 30 MIN: CPT | Performed by: FAMILY MEDICINE

## 2024-11-25 PROCEDURE — 83036 HEMOGLOBIN GLYCOSYLATED A1C: CPT | Performed by: FAMILY MEDICINE

## 2024-11-25 PROCEDURE — 36415 COLL VENOUS BLD VENIPUNCTURE: CPT | Performed by: FAMILY MEDICINE

## 2024-11-25 RX ORDER — ESCITALOPRAM OXALATE 20 MG/1
20 TABLET ORAL DAILY
Qty: 90 TABLET | Refills: 1 | Status: SHIPPED | OUTPATIENT
Start: 2024-11-25

## 2024-11-25 RX ORDER — BUPROPION HYDROCHLORIDE 150 MG/1
150 TABLET ORAL EVERY MORNING
Qty: 90 TABLET | Refills: 1 | Status: SHIPPED | OUTPATIENT
Start: 2024-11-25

## 2024-11-25 ASSESSMENT — ANXIETY QUESTIONNAIRES
6. BECOMING EASILY ANNOYED OR IRRITABLE: NOT AT ALL
2. NOT BEING ABLE TO STOP OR CONTROL WORRYING: NOT AT ALL
GAD7 TOTAL SCORE: 0
5. BEING SO RESTLESS THAT IT IS HARD TO SIT STILL: NOT AT ALL
GAD7 TOTAL SCORE: 0
7. FEELING AFRAID AS IF SOMETHING AWFUL MIGHT HAPPEN: NOT AT ALL
3. WORRYING TOO MUCH ABOUT DIFFERENT THINGS: NOT AT ALL
1. FEELING NERVOUS, ANXIOUS, OR ON EDGE: NOT AT ALL

## 2024-11-25 ASSESSMENT — PAIN SCALES - GENERAL: PAINLEVEL_OUTOF10: NO PAIN (0)

## 2024-11-25 ASSESSMENT — PATIENT HEALTH QUESTIONNAIRE - PHQ9
SUM OF ALL RESPONSES TO PHQ QUESTIONS 1-9: 6
5. POOR APPETITE OR OVEREATING: NOT AT ALL

## 2024-11-25 NOTE — PROGRESS NOTES
{PROVIDER CHARTING PREFERENCE:765747}    Flor Galo is a 73 year old, presenting for the following health issues:  Follow Up      2024     9:59 AM   Additional Questions   Roomed by Sully Acosta CMA   Accompanied by Wife, Jeniffer     RANJIT     Depression   How are you doing with your depression since your last visit? Improved   Are you having other symptoms that might be associated with depression? Yes:  over appetite  Have you had a significant life event?  No   Are you feeling anxious or having panic attacks?   No  Do you have any concerns with your use of alcohol or other drugs? No    Social History     Tobacco Use    Smoking status: Former     Current packs/day: 0.00     Types: Cigarettes     Quit date: 1982     Years since quittin.9    Smokeless tobacco: Never    Tobacco comments:     cigars, very occasional, maybe 1 x week   Substance Use Topics    Alcohol use: Not Currently    Drug use: No         10/9/2024     1:05 PM 10/28/2024     1:25 PM 2024    11:54 AM   PHQ   PHQ-9 Total Score 9 13 10    Q9: Thoughts of better off dead/self-harm past 2 weeks Several days  Nearly every day Several days    F/U: Thoughts of suicide or self-harm No   No    F/U: Safety concerns No   No        Patient-reported         2024     9:10 AM 10/9/2024     1:06 PM 10/28/2024     1:25 PM   CHARLIE-7 SCORE   Total Score  2 (minimal anxiety)    Total Score 0 2 3     {Last PHQ9 or GAD7 Responses (Optional):434945}    Suicide Assessment Five-step Evaluation and Treatment (SAFE-T)  {Provider  Link to Depression Care Package SmartSet :732340}  How many servings of fruits and vegetables do you eat daily?  2-3  On average, how many sweetened beverages do you drink each day (Examples: soda, juice, sweet tea, etc.  Do NOT count diet or artificially sweetened beverages)?   0  How many days per week do you exercise enough to make your heart beat faster? 3 or less  How many minutes a day do you exercise enough to make  your heart beat faster? 60 or more  How many days per week do you miss taking your medication? 0    {additonal problems for provider to add (Optional):711100}    {ROS Picklists (Optional):433416}      Objective    There were no vitals taken for this visit.  There is no height or weight on file to calculate BMI.  Physical Exam   {Exam List (Optional):525608}    {Diagnostic Test Results (Optional):767885}        Signed Electronically by: Dez Sinclair MD  {Email feedback regarding this note to [email or web address]   :439894}   no asymmetry, masses, or scars  RESP: lungs clear to auscultation - no rales, rhonchi or wheezes  CV: regular rate and rhythm, normal S1 S2, no S3 or S4, no murmur, click or rub, no peripheral edema  ABDOMEN: soft, nontender, no hepatosplenomegaly, no masses and bowel sounds normal  MS: no gross musculoskeletal defects noted, no edema            Signed Electronically by: Dez Sinclair MD

## 2024-12-17 ENCOUNTER — PATIENT OUTREACH (OUTPATIENT)
Dept: CARE COORDINATION | Facility: CLINIC | Age: 74
End: 2024-12-17
Payer: MEDICARE

## 2024-12-17 NOTE — PROGRESS NOTES
Clinic Care Coordination Contact  [facility]/Voicemail    Clinical Data: Care Coordinator Outreach    Outreach Documentation Number of Outreach Attempt   12/17/2024  10:14 AM 1     Left message on Patients wife Jeniffer's voicemail with call back information and requested return call.    Plan: Care Coordinator will try to reach patient again in 10 business days.    Neida Sutton  [facility] Health Worker  [facility] and Home Care Coordination   [facility] [place], [place], [facility]  Office: [phone]

## 2024-12-19 ENCOUNTER — PATIENT OUTREACH (OUTPATIENT)
Dept: CARE COORDINATION | Facility: CLINIC | Age: 74
End: 2024-12-19
Payer: MEDICARE

## 2024-12-19 NOTE — PROGRESS NOTES
Clinic Care Coordination Contact  Community Health Worker Follow Up    Care Gaps:     Health Maintenance Due   Topic Date Due    DEPRESSION ACTION PLAN  Never done    LUNG CANCER SCREENING  Never done    DTAP/TDAP/TD IMMUNIZATION (4 - Td or Tdap) 12/22/2024    MEDICARE ANNUAL WELLNESS VISIT  01/18/2025    LIPID  01/18/2025    MICROALBUMIN  01/18/2025    DIABETIC FOOT EXAM  01/18/2025       Scheduled 1/16/25 for AWV with Dr. Sinclair      Care Plan:   Care Plan: Help At Home       Problem: Insufficient In-home support       Goal: Establish adequate home support       Start Date: 11/15/2024    This Visit's Progress: 40%    Note:     Goal Statement: I will continue to take steps over the next three month(s) to identify and establish with in home/community supports which will allow me to maintain my current level of independence.  Barriers: ability to engage in services, location   Strengths: family support  Patient expressed understanding of goal: yes    Action steps to achieve this goal:  I will review resources and supports sent to me via [employer] : transportation, respite (LSS, Family Means). Completed  I will outreach to supports and consider establishing with ones I might find beneficial. I Jeniffer, have Iraj on the wait list for Family Means. [facility] are also look [facility]. Iraj has an appointment with [facility] doctor on 1/23/25 for an evaluation to get in home services started.   I will work with my primary care provider to establish with PT home care. (Not discussed today 12/19/24)  I will continue to outreach to care coordination as needed for additional resources or supports. Continuous (MB)                              Intervention and Education during outreach:     Family Means-On wait list    Currently applying for VA benefits once these services start patients wife will look into other services to bridge the gap with what he may be missing for help in the home.-January 23rd  VA doc  evaluation appointment.    CHW will follow up with patients with after the 1/23/25 to see how VA appointment went.    No other needs at this time.     CHW Plan: CHW will follow up with patient's wife Jeniffer in about 1 month.     Neida Sutton  Community Health Worker  [facility] Care Coordination   [facility], [facility], [facility]  Office: [phone]

## 2024-12-31 ENCOUNTER — TELEPHONE (OUTPATIENT)
Dept: FAMILY MEDICINE | Facility: CLINIC | Age: 74
End: 2024-12-31

## 2024-12-31 NOTE — TELEPHONE ENCOUNTER
Reason for Call:  Appointment Request    Patient requesting this type of appt: Chronic Diease Management/Medication/Follow-Up    Requested provider: Dez Sinclair    Reason patient unable to be scheduled:  has an annual scheduled for 1/16, wife unsure if a medication follow up for depression is needed before then. Only has 14 pills left and wondering if they need to be seen before then for a refill. 90 day refills are cheaper for them instead of 30    Wife wasn't sure if they can discuss what they need to discuss at the Annual wellness    When does patient want to be seen/preferred time: 1-2 days    Could we send this information to you in [employer] or would you prefer to receive a phone call?:   Patient would like to be contacted via [employer]    Call taken on 12/31/2024 at 3:47 PM by Fide Ayala

## 2024-12-31 NOTE — TELEPHONE ENCOUNTER
Spoke to wife and told her that refills were sent 11/25/2024 and to call [facility].    Also told to keep annual

## 2025-01-13 SDOH — HEALTH STABILITY: PHYSICAL HEALTH: ON AVERAGE, HOW MANY MINUTES DO YOU ENGAGE IN EXERCISE AT THIS LEVEL?: 0 MIN

## 2025-01-13 SDOH — HEALTH STABILITY: PHYSICAL HEALTH: ON AVERAGE, HOW MANY DAYS PER WEEK DO YOU ENGAGE IN MODERATE TO STRENUOUS EXERCISE (LIKE A BRISK WALK)?: 0 DAYS

## 2025-01-13 ASSESSMENT — SOCIAL DETERMINANTS OF HEALTH (SDOH): HOW OFTEN DO YOU GET TOGETHER WITH FRIENDS OR RELATIVES?: NEVER

## 2025-01-16 ENCOUNTER — OFFICE VISIT (OUTPATIENT)
Dept: FAMILY MEDICINE | Facility: CLINIC | Age: 75
End: 2025-01-16
Payer: MEDICARE

## 2025-01-16 VITALS
DIASTOLIC BLOOD PRESSURE: 84 MMHG | BODY MASS INDEX: 31.21 KG/M2 | RESPIRATION RATE: 16 BRPM | WEIGHT: 210.7 LBS | OXYGEN SATURATION: 93 % | HEIGHT: 69 IN | HEART RATE: 75 BPM | TEMPERATURE: 96.9 F | SYSTOLIC BLOOD PRESSURE: 136 MMHG

## 2025-01-16 DIAGNOSIS — E11.3291 TYPE 2 DIABETES MELLITUS WITH RIGHT EYE AFFECTED BY MILD NONPROLIFERATIVE RETINOPATHY WITHOUT MACULAR EDEMA, WITHOUT LONG-TERM CURRENT USE OF INSULIN (H): ICD-10-CM

## 2025-01-16 DIAGNOSIS — Z00.00 ENCOUNTER FOR MEDICARE ANNUAL WELLNESS EXAM: ICD-10-CM

## 2025-01-16 DIAGNOSIS — F33.2 SEVERE EPISODE OF RECURRENT MAJOR DEPRESSIVE DISORDER, WITHOUT PSYCHOTIC FEATURES (H): ICD-10-CM

## 2025-01-16 DIAGNOSIS — E78.2 MIXED HYPERLIPIDEMIA: ICD-10-CM

## 2025-01-16 DIAGNOSIS — G35 MULTIPLE SCLEROSIS (H): ICD-10-CM

## 2025-01-16 DIAGNOSIS — E11.9 TYPE 2 DIABETES MELLITUS WITHOUT COMPLICATION, WITHOUT LONG-TERM CURRENT USE OF INSULIN (H): Primary | ICD-10-CM

## 2025-01-16 DIAGNOSIS — Z23 NEED FOR VACCINE FOR TD (TETANUS-DIPHTHERIA): ICD-10-CM

## 2025-01-16 DIAGNOSIS — T50.2X5A DIURETIC-INDUCED HYPOKALEMIA: ICD-10-CM

## 2025-01-16 DIAGNOSIS — G30.9 ALZHEIMER'S DISEASE (H): ICD-10-CM

## 2025-01-16 DIAGNOSIS — E87.6 DIURETIC-INDUCED HYPOKALEMIA: ICD-10-CM

## 2025-01-16 DIAGNOSIS — I10 ESSENTIAL HYPERTENSION: ICD-10-CM

## 2025-01-16 DIAGNOSIS — F02.80 ALZHEIMER'S DISEASE (H): ICD-10-CM

## 2025-01-16 DIAGNOSIS — N39.45 CONTINUOUS LEAKAGE OF URINE: ICD-10-CM

## 2025-01-16 DIAGNOSIS — R29.6 FALLS FREQUENTLY: ICD-10-CM

## 2025-01-16 LAB
ANION GAP SERPL CALCULATED.3IONS-SCNC: 12 MMOL/L (ref 7–15)
BUN SERPL-MCNC: 11 MG/DL (ref 8–23)
CALCIUM SERPL-MCNC: 9.6 MG/DL (ref 8.8–10.4)
CHLORIDE SERPL-SCNC: 97 MMOL/L (ref 98–107)
CHOLEST SERPL-MCNC: 139 MG/DL
CREAT SERPL-MCNC: 0.86 MG/DL (ref 0.67–1.17)
CREAT UR-MCNC: 154.4 MG/DL
EGFRCR SERPLBLD CKD-EPI 2021: >90 ML/MIN/1.73M2
FASTING STATUS PATIENT QL REPORTED: YES
FASTING STATUS PATIENT QL REPORTED: YES
GLUCOSE SERPL-MCNC: 147 MG/DL (ref 70–99)
HCO3 SERPL-SCNC: 33 MMOL/L (ref 22–29)
HDLC SERPL-MCNC: 50 MG/DL
LDLC SERPL CALC-MCNC: 64 MG/DL
MICROALBUMIN UR-MCNC: 24.8 MG/L
MICROALBUMIN/CREAT UR: 16.06 MG/G CR (ref 0–17)
NONHDLC SERPL-MCNC: 89 MG/DL
POTASSIUM SERPL-SCNC: 3.6 MMOL/L (ref 3.4–5.3)
SODIUM SERPL-SCNC: 142 MMOL/L (ref 135–145)
TRIGL SERPL-MCNC: 123 MG/DL

## 2025-01-16 RX ORDER — METFORMIN HYDROCHLORIDE 500 MG/1
500 TABLET, EXTENDED RELEASE ORAL
Qty: 90 TABLET | Refills: 1 | Status: SHIPPED | OUTPATIENT
Start: 2025-01-16

## 2025-01-16 ASSESSMENT — PAIN SCALES - GENERAL: PAINLEVEL_OUTOF10: NO PAIN (0)

## 2025-01-16 ASSESSMENT — PATIENT HEALTH QUESTIONNAIRE - PHQ9
SUM OF ALL RESPONSES TO PHQ QUESTIONS 1-9: 8
10. IF YOU CHECKED OFF ANY PROBLEMS, HOW DIFFICULT HAVE THESE PROBLEMS MADE IT FOR YOU TO DO YOUR WORK, TAKE CARE OF THINGS AT HOME, OR GET ALONG WITH OTHER PEOPLE: EXTREMELY DIFFICULT
SUM OF ALL RESPONSES TO PHQ QUESTIONS 1-9: 8

## 2025-01-16 NOTE — PATIENT INSTRUCTIONS
Patient Education   Preventive Care Advice   This is general advice given by our system to help you stay healthy. However, your care team may have specific advice just for you. Please talk to your care team about your preventive care needs.  Nutrition  Eat 5 or more servings of fruits and vegetables each day.  Try wheat bread, brown rice and whole grain pasta (instead of white bread, rice, and pasta).  Get enough calcium and vitamin D. Check the label on foods and aim for 100% of the RDA (recommended daily allowance).  Lifestyle  Exercise at least 150 minutes each week  (30 minutes a day, 5 days a week).  Do muscle strengthening activities 2 days a week. These help control your weight and prevent disease.  No smoking.  Wear sunscreen to prevent skin cancer.  Have a dental exam and cleaning every 6 months.  Yearly exams  See your health care team every year to talk about:  Any changes in your health.  Any medicines your care team has prescribed.  Preventive care, family planning, and ways to prevent chronic diseases.  Shots (vaccines)   HPV shots (up to age 26), if you've never had them before.  Hepatitis B shots (up to age 59), if you've never had them before.  COVID-19 shot: Get this shot when it's due.  Flu shot: Get a flu shot every year.  Tetanus shot: Get a tetanus shot every 10 years.  Pneumococcal, hepatitis A, and RSV shots: Ask your care team if you need these based on your risk.  Shingles shot (for age 50 and up)  General health tests  Diabetes screening:  Starting at age 35, Get screened for diabetes at least every 3 years.  If you are younger than age 35, ask your care team if you should be screened for diabetes.  Cholesterol test: At age 39, start having a cholesterol test every 5 years, or more often if advised.  Bone density scan (DEXA): At age 50, ask your care team if you should have this scan for osteoporosis (brittle bones).  Hepatitis C: Get tested at least once in your life.  STIs (sexually  transmitted infections)  Before age 24: Ask your care team if you should be screened for STIs.  After age 24: Get screened for STIs if you're at risk. You are at risk for STIs (including HIV) if:  You are sexually active with more than one person.  You don't use condoms every time.  You or a partner was diagnosed with a sexually transmitted infection.  If you are at risk for HIV, ask about PrEP medicine to prevent HIV.  Get tested for HIV at least once in your life, whether you are at risk for HIV or not.  Cancer screening tests  Cervical cancer screening: If you have a cervix, begin getting regular cervical cancer screening tests starting at age 21.  Breast cancer scan (mammogram): If you've ever had breasts, begin having regular mammograms starting at age 40. This is a scan to check for breast cancer.  Colon cancer screening: It is important to start screening for colon cancer at age 45.  Have a colonoscopy test every 10 years (or more often if you're at risk) Or, ask your provider about stool tests like a FIT test every year or Cologuard test every 3 years.  To learn more about your testing options, visit:   .  For help making a decision, visit:   https://bit.ly/sk44512.  Prostate cancer screening test: If you have a prostate, ask your care team if a prostate cancer screening test (PSA) at age 55 is right for you.  Lung cancer screening: If you are a current or former smoker ages 50 to 80, ask your care team if ongoing lung cancer screenings are right for you.  For informational purposes only. Not to replace the advice of your health care provider. Copyright   2023 [facility]. All rights reserved. Clinically reviewed by the [facility]. [employer] [id number] - REV 01/24.  Learning About Activities of Daily Living  What are activities of daily living?     Activities of daily living (ADLs) are the basic self-care tasks you do every day. These include eating, bathing, dressing,  and moving around.  As you age, and if you have health problems, you may find that it's harder to do some of these tasks. If so, your doctor can suggest ideas that may help.  To measure what kind of help you may need, your doctor will ask how well you are able to do ADLs. Let your doctor know if there are any tasks that you are having trouble doing. This is an important first step to getting help. And when you have the help you need, you can stay as independent as possible.  How will a doctor assess your ADLs?  Asking about ADLs is part of a routine health checkup your doctor will likely do as you age. Your health check might be done in a doctor's office, in your home, or at a hospital. The goal is to find out if you are having any problems that could make it hard to care for yourself or that make it unsafe for you to be on your own.  To measure your ADLs, your doctor will ask how hard it is for you to do routine tasks. Your doctor may also want to know if you have changed the way you do a task because of a health problem. Your doctor may watch how you:  Walk back and forth.  Keep your balance while you stand or walk.  Move from sitting to standing or from a bed to a chair.  Button or unbutton a shirt or sweater.  Remove and put on your shoes.  It's common to feel a little worried or anxious if you find you can't do all the things you used to be able to do. Talking with your doctor about ADLs is a way to make sure you're as safe as possible and able to care for yourself as well as you can. You may want to bring a caregiver, friend, or family member to your checkup. They can help you talk to your doctor.  Follow-up care is a key part of your treatment and safety. Be sure to make and go to all appointments, and call your doctor if you are having problems. It's also a good idea to know your test results and keep a list of the medicines you take.  Current as of: October 24, 2023  Content Version: 14.3    2024 [facility]  [employer].   Care instructions adapted under license by your healthcare professional. If you have questions about a medical condition or this instruction, always ask your healthcare professional. [facility] [employer] disclaims any warranty or liability for your use of this information.    Preventing Falls: Care Instructions  Injuries and health problems such as trouble walking or poor eyesight can increase your risk of falling. So can some medicines. But there are things you can do to help prevent falls. You can exercise to get stronger. You can also arrange your home to make it safer.    Talk to your doctor about the medicines you take. Ask if any of them increase the risk of falls and whether they can be changed or stopped.   Try to exercise regularly. It can help improve your strength and balance. This can help lower your risk of falling.         Practice fall safety and prevention.   Wear low-heeled shoes that fit well and give your feet good support. Talk to your doctor if you have foot problems that make this hard.  Carry a cellphone or wear a medical alert device that you can use to call for help.  Use stepladders instead of chairs to reach high objects. Don't climb if you're at risk for falls. Ask for help, if needed.  Wear the correct eyeglasses, if you need them.        Make your home safer.   Remove rugs, cords, clutter, and furniture from walkways.  Keep your house well lit. Use night-lights in hallways and bathrooms.  Install and use sturdy handrails on stairways.  Wear nonskid footwear, even inside. Don't walk barefoot or in socks without shoes.        Be safe outside.   Use handrails, curb cuts, and ramps whenever possible.  Keep your hands free by using a shoulder bag or backpack.  Try to walk in well-lit areas. Watch out for uneven ground, changes in pavement, and debris.  Be careful in the winter. Walk on the grass or gravel when sidewalks are slippery. Use de-icer on steps and walkways.  "Add non-slip devices to shoes.    Put grab bars and nonskid mats in your shower or tub and near the toilet. Try to use a shower chair or bath bench when bathing.   Get into a tub or shower by putting in your weaker leg first. Get out with your strong side first. Have a phone or medical alert device in the bathroom with you.   Where can you learn more?  Go to https://www.[employer]/patiented  Enter G117 in the search box to learn more about \"Preventing Falls: Care Instructions.\"  Current as of: July 31, 2024  Content Version: 14.3    2024 [employer].   Care instructions adapted under license by your healthcare professional. If you have questions about a medical condition or this instruction, always ask your healthcare professional. [employer] disclaims any warranty or liability for your use of this information.    Learning About Stress  What is stress?     Stress is your body's response to a hard situation. Your body can have a physical, emotional, or mental response. Stress is a fact of life for most people, and it affects everyone differently. What causes stress for you may not be stressful for someone else.  A lot of things can cause stress. You may feel stress when you go on a job interview, take a test, or run a race. This kind of short-term stress is normal and even useful. It can help you if you need to work hard or react quickly. For example, stress can help you finish an important job on time.  Long-term stress is caused by ongoing stressful situations or events. Examples of long-term stress include long-term health problems, ongoing problems at work, or conflicts in your family. Long-term stress can harm your health.  How does stress affect your health?  When you are stressed, your body responds as though you are in danger. It makes hormones that speed up your heart, make you breathe faster, and give you a burst of energy. This is called the fight-or-flight stress response. If " the stress is over quickly, your body goes back to normal and no harm is done.  But if stress happens too often or lasts too long, it can have bad effects. Long-term stress can make you more likely to get sick, and it can make symptoms of some diseases worse. If you tense up when you are stressed, you may develop neck, shoulder, or low back pain. Stress is linked to high blood pressure and heart disease.  Stress also harms your emotional health. It can make you davila, tense, or depressed. Your relationships may suffer, and you may not do well at work or school.  What can you do to manage stress?  You can try these things to help manage stress:   Do something active. Exercise or activity can help reduce stress. Walking is a great way to get started. Even everyday activities such as housecleaning or yard work can help.  Try yoga or sheng chi. These techniques combine exercise and meditation. You may need some training at first to learn them.  Do something you enjoy. For example, listen to music or go to a movie. Practice your hobby or do volunteer work.  Meditate. This can help you relax, because you are not worrying about what happened before or what may happen in the future.  Do guided imagery. Imagine yourself in any setting that helps you feel calm. You can use online videos, books, or a teacher to guide you.  Do breathing exercises. For example:  From a standing position, bend forward from the waist with your knees slightly bent. Let your arms dangle close to the floor.  Breathe in slowly and deeply as you return to a standing position. Roll up slowly and lift your head last.  Hold your breath for just a few seconds in the standing position.  Breathe out slowly and bend forward from the waist.  Let your feelings out. Talk, laugh, cry, and express anger when you need to. Talking with supportive friends or family, a counselor, or a roman leader about your feelings is a healthy way to relieve stress. Avoid discussing  "your feelings with people who make you feel worse.  Write. It may help to write about things that are bothering you. This helps you find out how much stress you feel and what is causing it. When you know this, you can find better ways to cope.  What can you do to prevent stress?  You might try some of these things to help prevent stress:  Manage your time. This helps you find time to do the things you want and need to do.  Get enough sleep. Your body recovers from the stresses of the day while you are sleeping.  Get support. Your family, friends, and community can make a difference in how you experience stress.  Limit your news feed. Avoid or limit time on social media or news that may make you feel stressed.  Do something active. Exercise or activity can help reduce stress. Walking is a great way to get started.  Where can you learn more?  Go to https://www.[employer]/patiented  Enter N032 in the search box to learn more about \"Learning About Stress.\"  Current as of: October 24, 2023  Content Version: 14.3    2024 [employer].   Care instructions adapted under license by your healthcare professional. If you have questions about a medical condition or this instruction, always ask your healthcare professional. [employer] disclaims any warranty or liability for your use of this information.    Bladder Training: Care Instructions  Your Care Instructions     Bladder training is used to treat urge incontinence and stress incontinence. Urge incontinence means that the need to urinate comes on so fast that you can't get to a toilet in time. Stress incontinence means that you leak urine because of pressure on your bladder. For example, it may happen when you laugh, cough, or lift something heavy.  Bladder training can increase how long you can wait before you have to urinate. It can also help your bladder hold more urine. And it can give you better control over the urge to urinate.  It is " important to remember that bladder training takes a few weeks to a few months to make a difference. You may not see results right away, but don't give up.  Follow-up care is a key part of your treatment and safety. Be sure to make and go to all appointments, and call your doctor if you are having problems. It's also a good idea to know your test results and keep a list of the medicines you take.  How can you care for yourself at home?  Work with your doctor to come up with a bladder training program that is right for you. You may use one or more of the following methods.  Delayed urination  In the beginning, try to keep from urinating for 5 minutes after you first feel the need to go.  While you wait, take deep, slow breaths to relax. Kegel exercises can also help you delay the need to go to the bathroom.  After some practice, when you can easily wait 5 minutes to urinate, try to wait 10 minutes before you urinate.  Slowly increase the waiting period until you are able to control when you have to urinate.  Scheduled urination  Empty your bladder when you first wake up in the morning.  Schedule times throughout the day when you will urinate.  Start by going to the bathroom every hour, even if you don't need to go.  Slowly increase the time between trips to the bathroom.  When you have found a schedule that works well for you, keep doing it.  If you wake up during the night and have to urinate, do it. Apply your schedule to waking hours only.  Kegel exercises  These tighten and strengthen pelvic muscles, which can help you control the flow of urine. (If doing these exercises causes pain, stop doing them and talk with your doctor.) To do Kegel exercises:  Squeeze your muscles as if you were trying not to pass gas. Or squeeze your muscles as if you were stopping the flow of urine. Your belly, legs, and buttocks shouldn't move.  Hold the squeeze for 3 seconds, then relax for 5 to 10 seconds.  Start with 3 seconds, then  "add 1 second each week until you are able to squeeze for 10 seconds.  Repeat the exercise 10 times a session. Do 3 to 8 sessions a day.  When should you call for help?  Watch closely for changes in your health, and be sure to contact your doctor if:    Your incontinence is getting worse.     You do not get better as expected.   Where can you learn more?  Go to https://www.[employer].net/patiented  Enter V684 in the search box to learn more about \"Bladder Training: Care Instructions.\"  Current as of: April 30, 2024  Content Version: 14.3    2024 [facility].   Care instructions adapted under license by your healthcare professional. If you have questions about a medical condition or this instruction, always ask your healthcare professional. [facility] disclaims any warranty or liability for your use of this information.    Learning About Depression Screening  What is depression screening?  Depression screening is a way to see if you have depression symptoms. It may be done by a doctor or counselor. It's often part of a routine checkup. That's because your mental health is just as important as your physical health.  Depression is a mental health condition that affects how you feel, think, and act. You may:  Have less energy.  Lose interest in your daily activities.  Feel sad and grouchy for a long time.  Depression is very common. It affects people of all ages.  Many things can lead to depression. Some people become depressed after they have a stroke or find out they have a major illness like cancer or heart disease. The death of a loved one or a breakup may lead to depression. It can run in families. Most experts believe that a combination of inherited genes and stressful life events can cause it.  What happens during screening?  You may be asked to fill out a form about your depression symptoms. You and the doctor will discuss your answers. The doctor may ask you more questions to learn more " "about how you think, act, and feel.  What happens after screening?  If you have symptoms of depression, your doctor will talk to you about your options.  Doctors usually treat depression with medicines or counseling. Often, combining the two works best. Many people don't get help because they think that they'll get over the depression on their own. But people with depression may not get better unless they get treatment.  The cause of depression is not well understood. There may be many factors involved. But if you have depression, it's not your fault.  A serious symptom of depression is thinking about death or suicide. If you or someone you care about talks about this or about feeling hopeless, get help right away.  It's important to know that depression can be treated. Medicine, counseling, and self-care may help.  Where can you learn more?  Go to https://www.[employer].net/patiented  Enter T185 in the search box to learn more about \"Learning About Depression Screening.\"  Current as of: July 31, 2024  Content Version: 14.3    2024 [employer].   Care instructions adapted under license by your healthcare professional. If you have questions about a medical condition or this instruction, always ask your healthcare professional. [employer] disclaims any warranty or liability for your use of this information.       "

## 2025-01-16 NOTE — PROGRESS NOTES
Preventive Care Visit  [facility] SONIA Sinclair MD, Family Medicine  Jan 16, 2025      Assessment & Plan     Type 2 diabetes mellitus without complication, without long-term current use of insulin (H)  Poor control, he is hungrey 2 hours post prandial  likely spiking than crashing  will start metformin 500mg  may need to titrate up.  Goal if to have nonpoor control  ,10a1c  - Lipid panel reflex to direct LDL Non-fasting; Future  - Albumin Random Urine Quantitative with Creat Ratio; Future  - FOOT EXAM  - metFORMIN (GLUCOPHAGE XR) 500 MG 24 hr tablet; Take 1 tablet (500 mg) by mouth daily (with dinner).    Encounter for Medicare annual wellness exa    Alzheimer's disease (H)  Poor control he is now dependent in toileting, dressing, and bathing.   Has had 3 falls recently uses walker 100% of the time, I placed referral for home care and [facility]HA.  [facility]    Severe episode of recurrent major depressive disorder, without psychotic features (H)    Essential hypertension  Good control.  Continue currant medications, continue to monitor.      Multiple sclerosis (H)  Has poor trunk stabliity  needs to lean against wal in order to feed himself.  Leans to left in chair  Poor control he is now dependent in toileting, dressing, and bathing.   Has had 3 falls recently uses walker 100% of the time, I placed referral for home care and HHA.  [facility].    Mixed hyperlipidemia  Good control.  Continue currant medications, continue to monitor.      Diuretic-induced hypokalemia  Good control.  Continue currant medications, continue to monitor.    - Basic metabolic panel  (Ca, Cl, CO2, Creat, Gluc, K, Na, BUN); Future    Need for vaccine for Td (tetanus-diphtheria)  - Td (tetanus & diphtheria toxoids) -  adult formulation - for ages 7 years and older; Inject 0.5 mLs into the muscle once for 1 dose.    Patient has been advised of split billing requirements and indicates understanding:  Yes        Counseling  Appropriate preventive services were addressed with this patient via screening, questionnaire, or discussion as appropriate for fall prevention, nutrition, physical activity, Tobacco-use cessation, social engagement, weight loss and cognition.  Checklist reviewing preventive services available has been given to the patient.  Reviewed patient's diet, addressing concerns and/or questions.   Patient is at risk for social isolation and has been provided with information about the benefit of social connection.   He is at risk for psychosocial distress and has been provided with information to reduce risk.   Updated plan of care.  Patient reported difficulty with activities of daily living were addressed today.Information on urinary incontinence and treatment options given to patient.   The patient's PHQ-9 score is consistent with mild depression. He was provided with information regarding depression.       Flor Galo is a 74 year old, presenting for the following:  Medicare Visit        1/16/2025     9:18 AM   Additional Questions   Roomed by Sully Acosta CMA   Accompanied by Wife, Jeniffer CHURCH    Answers submitted by the patient for this visit:  Patient Health Questionnaire (Submitted on 1/16/2025)  If you checked off any problems, how difficult have these problems made it for you to do your work, take care of things at home, or get along with other people?: Extremely difficult  PHQ9 TOTAL SCORE: 8    -Wanting to discuss potassium supplement. He was told to cut back on his potassium.     -Also wanting to discuss some side effects from other medication he is on. Seems to not be able to stop eating. Always wanting to eat.    -Wife has also been dressing him and showering him which is something new since last time they were in here.      Health Care Directive  Patient has a Health Care Directive on file  Advance care planning document is on file and is current.        1/13/2025    General Health   How would you rate your overall physical health? (!) FAIR   Feel stress (tense, anxious, or unable to sleep) To some extent   (!) STRESS CONCERN      1/13/2025   Nutrition   Diet: Regular (no restrictions)         1/13/2025   Exercise   Days per week of moderate/strenous exercise 0 days   Average minutes spent exercising at this level 0 min   (!) EXERCISE CONCERN      1/13/2025   Social Factors   Frequency of gathering with friends or relatives Never   Worry food won't last until get money to buy more No   Food not last or not have enough money for food? No   Do you have housing? (Housing is defined as stable permanent housing and does not include staying ouside in a car, in a tent, in an abandoned building, in an overnight shelter, or couch-surfing.) Yes   Are you worried about losing your housing? No   Lack of transportation? No   Unable to get utilities (heat,electricity)? No   (!) SOCIAL CONNECTIONS CONCERN      1/16/2025   Fall Risk   Reason Gait Speed Test Not Completed Patient verbalizes unable to perform test          1/13/2025   Activities of Daily Living- Home Safety   Needs help with the following daily activites Telephone use    Transportation    Bathing    Medication administration    Money management    Toileting    Dressing   Safety concerns in the home None of the above       Multiple values from one day are sorted in reverse-chronological order         1/13/2025   Dental   Dentist two times every year? Yes         1/13/2025   Hearing Screening   Hearing concerns? None of the above         1/13/2025   Driving Risk Screening   Patient/family members have concerns about driving (!) DECLINE         1/13/2025   General Alertness/Fatigue Screening   Have you been more tired than usual lately? No         1/13/2025   Urinary Incontinence Screening   Bothered by leaking urine in past 6 months Yes         1/13/2025   TB Screening   Were you born outside of the [place]? No       Today's PHQ-9  Score:       2025     9:02 AM   PHQ-9 SCORE   PHQ-9 Total Score MyChart 8 (Mild depression)   PHQ-9 Total Score 8        Patient-reported         2025   Substance Use   Alcohol more than 3/day or more than 7/wk No   Do you have a current opioid prescription? No   How severe/bad is pain from 1 to 10? 0/10 (No Pain)   Do you use any other substances recreationally? No     Social History     Tobacco Use    Smoking status: Former     Current packs/day: 0.00     Types: Cigarettes     Quit date: 1982     Years since quittin.0    Smokeless tobacco: Never    Tobacco comments:     cigars, very occasional, maybe 1 x week   Substance Use Topics    Alcohol use: Not Currently    Drug use: No       ASCVD Risk   The 10-year ASCVD risk score (Hannah FOSTER, et al., 2019) is: 43.2%    Values used to calculate the score:      Age: 74 years      Sex: Male      Is Non- : No      Diabetic: Yes      Tobacco smoker: No      Systolic Blood Pressure: 136 mmHg      Is BP treated: Yes      HDL Cholesterol: 53 mg/dL      Total Cholesterol: 136 mg/dL                Reviewed and updated as needed this visit by Provider                    Current providers sharing in care for this patient include:  Patient Care Team:  Dez Sinclair MD as PCP - General (Family Practice)  Dez Sinclair MD as Assigned PCP  Imani Bello MD as Assigned Neuroscience Provider  Kathia Lara LICSW as Lead Care Coordinator (Primary Care - CC)  Neida Sutton CHW as Community Health Worker  Adriane Veliz LICSW as Assigned Behavioral Health Provider    The following health maintenance items are reviewed in Epic and correct as of today:  Health Maintenance   Topic Date Due    DEPRESSION ACTION PLAN  Never done    LUNG CANCER SCREENING  Never done    DTAP/TDAP/TD IMMUNIZATION (4 - Td or Tdap) 2024    LIPID  2025    MICROALBUMIN  2025    EYE EXAM  2025    A1C  2025  "   PHQ-9  07/16/2025    BMP  11/25/2025    ANNUAL REVIEW OF HM ORDERS  12/18/2025    MEDICARE ANNUAL WELLNESS VISIT  01/16/2026    DIABETIC FOOT EXAM  01/16/2026    FALL RISK ASSESSMENT  01/16/2026    ADVANCE CARE PLANNING  01/16/2030    COLORECTAL CANCER SCREENING  03/07/2032    HEPATITIS C SCREENING  Completed    INFLUENZA VACCINE  Completed    Pneumococcal Vaccine: 50+ Years  Completed    ZOSTER IMMUNIZATION  Completed    RSV VACCINE  Completed    AORTIC ANEURYSM SCREENING (SYSTEM ASSIGNED)  Completed    COVID-19 Vaccine  Completed    HPV IMMUNIZATION  Aged Out    MENINGITIS IMMUNIZATION  Aged Out    RSV MONOCLONAL ANTIBODY  Aged Out       Review of Systems  Constitutional, HEENT, cardiovascular, pulmonary, gi and gu systems are negative, except as otherwise noted.     Objective    Exam  /84 (BP Location: Right arm, Patient Position: Sitting, Cuff Size: Adult Regular)   Pulse 75   Temp 96.9  F (36.1  C) (Tympanic)   Resp 16   Ht 1.753 m (5' 9\")   Wt 95.6 kg (210 lb 11.2 oz)   SpO2 93%   BMI 31.11 kg/m     Estimated body mass index is 31.11 kg/m  as calculated from the following:    Height as of this encounter: 1.753 m (5' 9\").    Weight as of this encounter: 95.6 kg (210 lb 11.2 oz).    Physical Exam  GENERAL: alert leans to left  NECK: no adenopathy, no asymmetry, masses, or scars  RESP: lungs clear to auscultation - no rales, rhonchi or wheezes  CV: regular rate and rhythm, normal S1 S2, no S3 or S4, no murmur, click or rub, no peripheral edema  ABDOMEN: soft, nontender, no hepatosplenomegaly, no masses and bowel sounds normal  MS: leans to left  unstable in gait         1/16/2025   Mini Cog   Mini-Cog Not Completed (choose reason) Known dementia              Signed Electronically by: Dez Sinclair MD    "

## 2025-01-17 ENCOUNTER — APPOINTMENT (OUTPATIENT)
Dept: OCCUPATIONAL THERAPY | Facility: HOSPITAL | Age: 75
DRG: 562 | End: 2025-01-17
Attending: STUDENT IN AN ORGANIZED HEALTH CARE EDUCATION/TRAINING PROGRAM
Payer: MEDICARE

## 2025-01-17 ENCOUNTER — APPOINTMENT (OUTPATIENT)
Dept: PHYSICAL THERAPY | Facility: HOSPITAL | Age: 75
DRG: 562 | End: 2025-01-17
Attending: STUDENT IN AN ORGANIZED HEALTH CARE EDUCATION/TRAINING PROGRAM
Payer: MEDICARE

## 2025-01-17 ENCOUNTER — APPOINTMENT (OUTPATIENT)
Dept: RADIOLOGY | Facility: HOSPITAL | Age: 75
DRG: 562 | End: 2025-01-17
Attending: EMERGENCY MEDICINE
Payer: MEDICARE

## 2025-01-17 ENCOUNTER — APPOINTMENT (OUTPATIENT)
Dept: SPEECH THERAPY | Facility: HOSPITAL | Age: 75
DRG: 562 | End: 2025-01-17
Attending: STUDENT IN AN ORGANIZED HEALTH CARE EDUCATION/TRAINING PROGRAM
Payer: MEDICARE

## 2025-01-17 ENCOUNTER — HOSPITAL ENCOUNTER (INPATIENT)
Facility: HOSPITAL | Age: 75
LOS: 4 days | Discharge: NURSING, REHAB OR LONG-TERM CARE | DRG: 562 | End: 2025-01-21
Attending: EMERGENCY MEDICINE | Admitting: STUDENT IN AN ORGANIZED HEALTH CARE EDUCATION/TRAINING PROGRAM
Payer: MEDICARE

## 2025-01-17 DIAGNOSIS — R09.02 HYPOXIA: ICD-10-CM

## 2025-01-17 DIAGNOSIS — J18.9 PNEUMONIA DUE TO INFECTIOUS ORGANISM, UNSPECIFIED LATERALITY, UNSPECIFIED PART OF LUNG: Primary | ICD-10-CM

## 2025-01-17 DIAGNOSIS — S42.035A CLOSED NONDISPLACED FRACTURE OF ACROMIAL END OF LEFT CLAVICLE, INITIAL ENCOUNTER: ICD-10-CM

## 2025-01-17 LAB
ANION GAP SERPL CALCULATED.3IONS-SCNC: 10 MMOL/L (ref 7–15)
BASE EXCESS BLDV CALC-SCNC: 9.9 MMOL/L (ref -3–3)
BASOPHILS # BLD AUTO: 0 10E3/UL (ref 0–0.2)
BASOPHILS NFR BLD AUTO: 0 %
BUN SERPL-MCNC: 16.6 MG/DL (ref 8–23)
CALCIUM SERPL-MCNC: 9.4 MG/DL (ref 8.8–10.4)
CHLORIDE SERPL-SCNC: 97 MMOL/L (ref 98–107)
CREAT SERPL-MCNC: 0.84 MG/DL (ref 0.67–1.17)
EGFRCR SERPLBLD CKD-EPI 2021: >90 ML/MIN/1.73M2
EOSINOPHIL # BLD AUTO: 0 10E3/UL (ref 0–0.7)
EOSINOPHIL NFR BLD AUTO: 0 %
ERYTHROCYTE [DISTWIDTH] IN BLOOD BY AUTOMATED COUNT: 12.7 % (ref 10–15)
FLUAV RNA SPEC QL NAA+PROBE: NEGATIVE
FLUBV RNA RESP QL NAA+PROBE: NEGATIVE
GLUCOSE BLDC GLUCOMTR-MCNC: 141 MG/DL (ref 70–99)
GLUCOSE BLDC GLUCOMTR-MCNC: 144 MG/DL (ref 70–99)
GLUCOSE BLDC GLUCOMTR-MCNC: 157 MG/DL (ref 70–99)
GLUCOSE BLDC GLUCOMTR-MCNC: 162 MG/DL (ref 70–99)
GLUCOSE BLDC GLUCOMTR-MCNC: 174 MG/DL (ref 70–99)
GLUCOSE SERPL-MCNC: 160 MG/DL (ref 70–99)
HCO3 BLDV-SCNC: 36 MMOL/L (ref 21–28)
HCO3 SERPL-SCNC: 33 MMOL/L (ref 22–29)
HCT VFR BLD AUTO: 48.7 % (ref 40–53)
HGB BLD-MCNC: 16.3 G/DL (ref 13.3–17.7)
IMM GRANULOCYTES # BLD: 0 10E3/UL
IMM GRANULOCYTES NFR BLD: 0 %
LYMPHOCYTES # BLD AUTO: 1 10E3/UL (ref 0.8–5.3)
LYMPHOCYTES NFR BLD AUTO: 10 %
MCH RBC QN AUTO: 29.4 PG (ref 26.5–33)
MCHC RBC AUTO-ENTMCNC: 33.5 G/DL (ref 31.5–36.5)
MCV RBC AUTO: 88 FL (ref 78–100)
MONOCYTES # BLD AUTO: 0.8 10E3/UL (ref 0–1.3)
MONOCYTES NFR BLD AUTO: 9 %
NEUTROPHILS # BLD AUTO: 7.4 10E3/UL (ref 1.6–8.3)
NEUTROPHILS NFR BLD AUTO: 80 %
NRBC # BLD AUTO: 0 10E3/UL
NRBC BLD AUTO-RTO: 0 /100
NT-PROBNP SERPL-MCNC: 96 PG/ML (ref 0–900)
O2/TOTAL GAS SETTING VFR VENT: 28 %
OXYHGB MFR BLDV: 86 % (ref 70–75)
PCO2 BLDV: 54 MM HG (ref 40–50)
PH BLDV: 7.44 [PH] (ref 7.32–7.43)
PLATELET # BLD AUTO: 227 10E3/UL (ref 150–450)
PO2 BLDV: 52 MM HG (ref 25–47)
POTASSIUM SERPL-SCNC: 3.2 MMOL/L (ref 3.4–5.3)
POTASSIUM SERPL-SCNC: 3.2 MMOL/L (ref 3.4–5.3)
POTASSIUM SERPL-SCNC: 3.3 MMOL/L (ref 3.4–5.3)
RBC # BLD AUTO: 5.54 10E6/UL (ref 4.4–5.9)
RSV RNA SPEC NAA+PROBE: NEGATIVE
SAO2 % BLDV: 87.1 % (ref 70–75)
SARS-COV-2 RNA RESP QL NAA+PROBE: NEGATIVE
SODIUM SERPL-SCNC: 140 MMOL/L (ref 135–145)
WBC # BLD AUTO: 9.3 10E3/UL (ref 4–11)

## 2025-01-17 PROCEDURE — 73030 X-RAY EXAM OF SHOULDER: CPT | Mod: LT

## 2025-01-17 PROCEDURE — 250N000013 HC RX MED GY IP 250 OP 250 PS 637: Performed by: EMERGENCY MEDICINE

## 2025-01-17 PROCEDURE — 93005 ELECTROCARDIOGRAM TRACING: CPT | Performed by: EMERGENCY MEDICINE

## 2025-01-17 PROCEDURE — 250N000011 HC RX IP 250 OP 636: Performed by: EMERGENCY MEDICINE

## 2025-01-17 PROCEDURE — 250N000013 HC RX MED GY IP 250 OP 250 PS 637: Performed by: STUDENT IN AN ORGANIZED HEALTH CARE EDUCATION/TRAINING PROGRAM

## 2025-01-17 PROCEDURE — 250N000011 HC RX IP 250 OP 636: Performed by: INTERNAL MEDICINE

## 2025-01-17 PROCEDURE — 83880 ASSAY OF NATRIURETIC PEPTIDE: CPT | Performed by: EMERGENCY MEDICINE

## 2025-01-17 PROCEDURE — 97166 OT EVAL MOD COMPLEX 45 MIN: CPT | Mod: GO

## 2025-01-17 PROCEDURE — 82805 BLOOD GASES W/O2 SATURATION: CPT | Performed by: EMERGENCY MEDICINE

## 2025-01-17 PROCEDURE — 87637 SARSCOV2&INF A&B&RSV AMP PRB: CPT | Performed by: EMERGENCY MEDICINE

## 2025-01-17 PROCEDURE — 92610 EVALUATE SWALLOWING FUNCTION: CPT | Mod: GN

## 2025-01-17 PROCEDURE — 82962 GLUCOSE BLOOD TEST: CPT

## 2025-01-17 PROCEDURE — 71045 X-RAY EXAM CHEST 1 VIEW: CPT

## 2025-01-17 PROCEDURE — 36415 COLL VENOUS BLD VENIPUNCTURE: CPT | Performed by: STUDENT IN AN ORGANIZED HEALTH CARE EDUCATION/TRAINING PROGRAM

## 2025-01-17 PROCEDURE — 99285 EMERGENCY DEPT VISIT HI MDM: CPT | Mod: 25

## 2025-01-17 PROCEDURE — 36415 COLL VENOUS BLD VENIPUNCTURE: CPT | Performed by: EMERGENCY MEDICINE

## 2025-01-17 PROCEDURE — 120N000001 HC R&B MED SURG/OB

## 2025-01-17 PROCEDURE — 85004 AUTOMATED DIFF WBC COUNT: CPT | Performed by: EMERGENCY MEDICINE

## 2025-01-17 PROCEDURE — 80048 BASIC METABOLIC PNL TOTAL CA: CPT | Performed by: EMERGENCY MEDICINE

## 2025-01-17 PROCEDURE — 250N000011 HC RX IP 250 OP 636: Performed by: STUDENT IN AN ORGANIZED HEALTH CARE EDUCATION/TRAINING PROGRAM

## 2025-01-17 PROCEDURE — 97162 PT EVAL MOD COMPLEX 30 MIN: CPT | Mod: GP | Performed by: PHYSICAL THERAPIST

## 2025-01-17 PROCEDURE — 84132 ASSAY OF SERUM POTASSIUM: CPT | Performed by: STUDENT IN AN ORGANIZED HEALTH CARE EDUCATION/TRAINING PROGRAM

## 2025-01-17 PROCEDURE — 73080 X-RAY EXAM OF ELBOW: CPT | Mod: LT

## 2025-01-17 PROCEDURE — 99223 1ST HOSP IP/OBS HIGH 75: CPT | Mod: AI | Performed by: STUDENT IN AN ORGANIZED HEALTH CARE EDUCATION/TRAINING PROGRAM

## 2025-01-17 RX ORDER — METFORMIN HYDROCHLORIDE 500 MG/1
500 TABLET, EXTENDED RELEASE ORAL
Status: DISCONTINUED | OUTPATIENT
Start: 2025-01-17 | End: 2025-01-21 | Stop reason: HOSPADM

## 2025-01-17 RX ORDER — ENOXAPARIN SODIUM 100 MG/ML
40 INJECTION SUBCUTANEOUS EVERY 24 HOURS
Status: DISCONTINUED | OUTPATIENT
Start: 2025-01-17 | End: 2025-01-21 | Stop reason: HOSPADM

## 2025-01-17 RX ORDER — PIPERACILLIN SODIUM, TAZOBACTAM SODIUM 3; .375 G/15ML; G/15ML
3.38 INJECTION, POWDER, LYOPHILIZED, FOR SOLUTION INTRAVENOUS ONCE
Status: COMPLETED | OUTPATIENT
Start: 2025-01-17 | End: 2025-01-17

## 2025-01-17 RX ORDER — AMOXICILLIN 250 MG
1 CAPSULE ORAL 2 TIMES DAILY PRN
Status: DISCONTINUED | OUTPATIENT
Start: 2025-01-17 | End: 2025-01-21 | Stop reason: HOSPADM

## 2025-01-17 RX ORDER — LISINOPRIL 20 MG/1
20 TABLET ORAL DAILY
Status: DISCONTINUED | OUTPATIENT
Start: 2025-01-17 | End: 2025-01-21 | Stop reason: HOSPADM

## 2025-01-17 RX ORDER — PIPERACILLIN SODIUM, TAZOBACTAM SODIUM 3; .375 G/15ML; G/15ML
3.38 INJECTION, POWDER, LYOPHILIZED, FOR SOLUTION INTRAVENOUS EVERY 8 HOURS
Status: DISCONTINUED | OUTPATIENT
Start: 2025-01-17 | End: 2025-01-19

## 2025-01-17 RX ORDER — NICOTINE POLACRILEX 4 MG
15-30 LOZENGE BUCCAL
Status: DISCONTINUED | OUTPATIENT
Start: 2025-01-17 | End: 2025-01-21 | Stop reason: HOSPADM

## 2025-01-17 RX ORDER — CHLORTHALIDONE 25 MG/1
25 TABLET ORAL DAILY
Status: DISCONTINUED | OUTPATIENT
Start: 2025-01-17 | End: 2025-01-21 | Stop reason: HOSPADM

## 2025-01-17 RX ORDER — CALCIUM CARBONATE 500 MG/1
1000 TABLET, CHEWABLE ORAL 4 TIMES DAILY PRN
Status: DISCONTINUED | OUTPATIENT
Start: 2025-01-17 | End: 2025-01-21 | Stop reason: HOSPADM

## 2025-01-17 RX ORDER — ESCITALOPRAM OXALATE 10 MG/1
20 TABLET ORAL DAILY
Status: DISCONTINUED | OUTPATIENT
Start: 2025-01-17 | End: 2025-01-21 | Stop reason: HOSPADM

## 2025-01-17 RX ORDER — ACETAMINOPHEN 325 MG/1
975 TABLET ORAL 3 TIMES DAILY
Status: DISCONTINUED | OUTPATIENT
Start: 2025-01-17 | End: 2025-01-21 | Stop reason: HOSPADM

## 2025-01-17 RX ORDER — POTASSIUM CHLORIDE 1500 MG/1
40 TABLET, EXTENDED RELEASE ORAL ONCE
Status: COMPLETED | OUTPATIENT
Start: 2025-01-17 | End: 2025-01-17

## 2025-01-17 RX ORDER — HYDRALAZINE HYDROCHLORIDE 20 MG/ML
10 INJECTION INTRAMUSCULAR; INTRAVENOUS EVERY 6 HOURS PRN
Status: DISCONTINUED | OUTPATIENT
Start: 2025-01-17 | End: 2025-01-21 | Stop reason: HOSPADM

## 2025-01-17 RX ORDER — DEXTROSE MONOHYDRATE 25 G/50ML
25-50 INJECTION, SOLUTION INTRAVENOUS
Status: DISCONTINUED | OUTPATIENT
Start: 2025-01-17 | End: 2025-01-21 | Stop reason: HOSPADM

## 2025-01-17 RX ORDER — POTASSIUM CHLORIDE 1500 MG/1
20 TABLET, EXTENDED RELEASE ORAL 2 TIMES DAILY
Status: DISCONTINUED | OUTPATIENT
Start: 2025-01-17 | End: 2025-01-21 | Stop reason: HOSPADM

## 2025-01-17 RX ORDER — ROSUVASTATIN CALCIUM 10 MG/1
10 TABLET, COATED ORAL AT BEDTIME
Status: DISCONTINUED | OUTPATIENT
Start: 2025-01-17 | End: 2025-01-21 | Stop reason: HOSPADM

## 2025-01-17 RX ORDER — ACETAMINOPHEN 325 MG/1
650 TABLET ORAL ONCE
Status: COMPLETED | OUTPATIENT
Start: 2025-01-17 | End: 2025-01-17

## 2025-01-17 RX ORDER — POTASSIUM CHLORIDE 1500 MG/1
20 TABLET, EXTENDED RELEASE ORAL DAILY
Status: ON HOLD | COMMUNITY
End: 2025-01-21

## 2025-01-17 RX ORDER — AMOXICILLIN 250 MG
2 CAPSULE ORAL 2 TIMES DAILY PRN
Status: DISCONTINUED | OUTPATIENT
Start: 2025-01-17 | End: 2025-01-21 | Stop reason: HOSPADM

## 2025-01-17 RX ORDER — LIDOCAINE 40 MG/G
CREAM TOPICAL
Status: DISCONTINUED | OUTPATIENT
Start: 2025-01-17 | End: 2025-01-21 | Stop reason: HOSPADM

## 2025-01-17 RX ORDER — VITAMIN B COMPLEX
25 TABLET ORAL DAILY
Status: DISCONTINUED | OUTPATIENT
Start: 2025-01-17 | End: 2025-01-21 | Stop reason: HOSPADM

## 2025-01-17 RX ORDER — BUPROPION HYDROCHLORIDE 150 MG/1
150 TABLET ORAL EVERY MORNING
Status: DISCONTINUED | OUTPATIENT
Start: 2025-01-17 | End: 2025-01-21 | Stop reason: HOSPADM

## 2025-01-17 RX ADMIN — POTASSIUM CHLORIDE 40 MEQ: 1500 TABLET, EXTENDED RELEASE ORAL at 16:27

## 2025-01-17 RX ADMIN — PIPERACILLIN AND TAZOBACTAM 3.38 G: 3; .375 INJECTION, POWDER, FOR SOLUTION INTRAVENOUS at 21:01

## 2025-01-17 RX ADMIN — PIPERACILLIN AND TAZOBACTAM 3.38 G: 3; .375 INJECTION, POWDER, FOR SOLUTION INTRAVENOUS at 11:00

## 2025-01-17 RX ADMIN — POTASSIUM CHLORIDE 40 MEQ: 1500 TABLET, EXTENDED RELEASE ORAL at 03:15

## 2025-01-17 RX ADMIN — ESCITALOPRAM OXALATE 20 MG: 20 TABLET ORAL at 08:36

## 2025-01-17 RX ADMIN — Medication 25 MCG: at 08:08

## 2025-01-17 RX ADMIN — ACETAMINOPHEN 650 MG: 325 TABLET ORAL at 02:24

## 2025-01-17 RX ADMIN — ENOXAPARIN SODIUM 40 MG: 40 INJECTION SUBCUTANEOUS at 21:00

## 2025-01-17 RX ADMIN — POTASSIUM CHLORIDE 40 MEQ: 1500 TABLET, EXTENDED RELEASE ORAL at 21:00

## 2025-01-17 RX ADMIN — LISINOPRIL 20 MG: 20 TABLET ORAL at 08:08

## 2025-01-17 RX ADMIN — ACETAMINOPHEN 975 MG: 325 TABLET ORAL at 21:00

## 2025-01-17 RX ADMIN — BUPROPION HYDROCHLORIDE 150 MG: 150 TABLET, FILM COATED, EXTENDED RELEASE ORAL at 08:36

## 2025-01-17 RX ADMIN — ACETAMINOPHEN 325 MG: 325 TABLET ORAL at 08:07

## 2025-01-17 RX ADMIN — ACETAMINOPHEN 975 MG: 325 TABLET ORAL at 13:15

## 2025-01-17 RX ADMIN — HYDRALAZINE HYDROCHLORIDE 10 MG: 20 INJECTION INTRAMUSCULAR; INTRAVENOUS at 21:04

## 2025-01-17 RX ADMIN — PIPERACILLIN AND TAZOBACTAM 3.38 G: 3; .375 INJECTION, POWDER, FOR SOLUTION INTRAVENOUS at 04:20

## 2025-01-17 RX ADMIN — ROSUVASTATIN 10 MG: 10 TABLET, FILM COATED ORAL at 21:00

## 2025-01-17 ASSESSMENT — ACTIVITIES OF DAILY LIVING (ADL)
ADLS_ACUITY_SCORE: 41
ADLS_ACUITY_SCORE: 42
DEPENDENT_IADLS:: CLEANING;COOKING;LAUNDRY;SHOPPING;MEAL PREPARATION;MEDICATION MANAGEMENT;MONEY MANAGEMENT;TRANSPORTATION;INCONTINENCE
ADLS_ACUITY_SCORE: 55
ADLS_ACUITY_SCORE: 41
ADLS_ACUITY_SCORE: 42
ADLS_ACUITY_SCORE: 41
ADLS_ACUITY_SCORE: 55
ADLS_ACUITY_SCORE: 41
ADLS_ACUITY_SCORE: 33
ADLS_ACUITY_SCORE: 41
ADLS_ACUITY_SCORE: 33
ADLS_ACUITY_SCORE: 42
ADLS_ACUITY_SCORE: 41
ADLS_ACUITY_SCORE: 41
ADLS_ACUITY_SCORE: 55
ADLS_ACUITY_SCORE: 55
ADLS_ACUITY_SCORE: 41
ADLS_ACUITY_SCORE: 33

## 2025-01-17 ASSESSMENT — COLUMBIA-SUICIDE SEVERITY RATING SCALE - C-SSRS
2. HAVE YOU ACTUALLY HAD ANY THOUGHTS OF KILLING YOURSELF IN THE PAST MONTH?: NO
6. HAVE YOU EVER DONE ANYTHING, STARTED TO DO ANYTHING, OR PREPARED TO DO ANYTHING TO END YOUR LIFE?: NO
1. IN THE PAST MONTH, HAVE YOU WISHED YOU WERE DEAD OR WISHED YOU COULD GO TO SLEEP AND NOT WAKE UP?: NO

## 2025-01-17 NOTE — ED PROVIDER NOTES
EMERGENCY DEPARTMENT ENCOUNTER      NAME: Iraj De Leon  AGE: 74 year old male  YOB: 1950  MRN: [id number]  EVALUATION DATE & TIME: 1/17/2025  1:49 AM    PCP: Dez Sinclair    ED PROVIDER: Fatuma Tafoya M.D.      Chief Complaint   Patient presents with    Fall         FINAL IMPRESSION:  1. Hypoxia    2. Closed nondisplaced fracture of acromial end of left clavicle, initial encounter        MEDICAL DECISION MAKING:    Pertinent Labs & Imaging studies reviewed. (See chart for details)  ED Course as of 01/17/25 0456   Fri Jan 17, 2025   0155 Afebrile.  Vital signs here unremarkable.  Patient presenting after a fall at home.  Has history of Alzheimer's, lives with hisPartner.  Had a fall last night where he fell, struck his right shoulder.  They put him to bed with some Tylenol and then he woke up again this evening and tripped and fell and struck his left shoulder again.  He does not take any blood thinner medications.  These falls were witnessed by family.  Did not strike his head or lose conscious.  Only complaint here is of left shoulder pain.    Exam for patient here very pleasant appearing patient without any signs of external trauma.  He does have some mild tenderness on palpation of his left shoulder passively move his shoulder without pain in his distal humerus as well with palpation.  Otherwise his exam is unremarkable.    Will get an x-ray, give him some Tylenol.  Witnessed fall, did not strike head.  No blood thinner medications do not feel we need to scan his head.  Frequent falls at home, seems that this is more a slip.   0302 Labs shows potassium slightly low, repleted.  BNP appropriate.  Labs otherwise unremarkable.   0310 Patient's x-rays here  with the exception of chest x-ray showing small amount of pneumonitis.  He is not short of breath.  He was on 2 L when he came in, was on 1 L here for a while with oxygen saturations at 96%.  We did turn it off to see how he is feeling and he  is maintaining oxygen saturations in the low 90s but will continue to monitor to see if he does drop his sats further.  He is not hypoxic nor does he have any signs of respiratory distress.  His x-ray of his shoulder reveals a possible distal clavicle fracture.  This is likely cause for his discomfort.  Will place him in a brace.     Patient did require 2 L of oxygen to maintain sats above 90 once he fell asleep.  Did have small amount of pneumonitis seen on his chest x-ray.  Started antibiotics, overall here he looks well.  Spoke with hospitalist for admission.    Medical Decision Making  Obtained supplemental history:Supplemental history obtained?: EMS  Reviewed external records: External records reviewed?: No  Care impacted by chronic illness:Diabetes, Hyperlipidemia, Hypertension, Smoking / Nicotine Use, and Other: multiple sclerosis  Did you consider but not order tests?: Work up considered but not performed and documented in chart, if applicable  Did you interpret images independently?: Independent interpretation of ECG and images noted in documentation, when applicable.  Consultation discussion with other provider:Did you involve another provider (consultant, , pharmacy, etc.)?: No  Admit.    MIPS: Not Applicable        Critical care: 0 minutes excluding separately billable procedures.  Includes bedside management, time reviewing test results, review of records, discussing the case with staff, documenting the medical record and time spent with family members (or surrogate decision makers) discussing specific treatment issues.          ED COURSE:  1:54 AM I met with the patient, obtained history, performed an initial exam, and discussed options and plan for diagnostics and treatment here in the ED.   4:12 AM I spoke with the accepting hospitalist, Dr. Monterroso.    The importance of close follow up was discussed. We reviewed warning signs and symptoms, and I instructed Mr. De Leon to return to the emergency  department immediately if he develops any new or worsening symptoms. I provided additional verbal discharge instructions. Mr. De Leon expressed understanding and agreement with this plan of care, his questions were answered, and he was discharged in stable condition.     MEDICATIONS GIVEN IN THE EMERGENCY:  Medications   piperacillin-tazobactam (ZOSYN) 3.375 g vial to attach to  mL bag (has no administration in time range)   glucose gel 15-30 g (has no administration in time range)     Or   dextrose 50 % injection 25-50 mL (has no administration in time range)     Or   glucagon injection 1 mg (has no administration in time range)   insulin aspart (NovoLOG) injection (RAPID ACTING) (has no administration in time range)   insulin aspart (NovoLOG) injection (RAPID ACTING) (has no administration in time range)   acetaminophen (TYLENOL) tablet 650 mg (650 mg Oral $Given 1/17/25 0224)   potassium chloride jamal ER (KLOR-CON M20) CR tablet 40 mEq (40 mEq Oral $Given 1/17/25 0315)   piperacillin-tazobactam (ZOSYN) 3.375 g vial to attach to  mL bag (3.375 g Intravenous $New Bag 1/17/25 0420)       NEW PRESCRIPTIONS STARTED AT TODAY'S ER VISIT:  New Prescriptions    No medications on file          =================================================================    HPI    Patient information was obtained from: EMS    Use of : N/A       Iraj De Leon is a 74 year old male who presents after a fall.     HPI limited due to dementia.     Per EMS,  The patient arrives via EMS from home where he resides with his wife. Yesterday, on 1/16, the patient had a fall at home. Afterwards, he complained of left shoulder pain. He went to bed but then woke again at 1:00 AM thinking that it was the morning. Unfortunately, the patient then fell again and again complained of left shoulder pain. He has a history of dementia and multiple sclerosis.     Now, he complains of left shoulder and left upper arm pain.    REVIEW OF  SYSTEMS   Review of Systems      PAST MEDICAL HISTORY:  Past Medical History:   Diagnosis Date    Arthritis     Cancer (H) 02/2020    basal cell Moh's forehead    Diabetes mellitus (H)     type 2, diet controlled as of March 2019    Headache 2014    Due to spontaneous spinal fluid leak    Hyperlipidemia     Hypertension     Kidney stones 2019    Multiple sclerosis (H)     Osteoarthritis     bilateral hips    Pituitary microadenoma (H)     Posterior vitreous detachment        PAST SURGICAL HISTORY:  Past Surgical History:   Procedure Laterality Date    BASAL CELL CARCINOMA EXCISION  02/20/2020    Moh's procedure to forehead    CHOLECYSTECTOMY      COLONOSCOPY      EYE SURGERY Right     cataract    IR LUMBAR PUNCTURE  3/28/2014    JOINT REPLACEMENT  10/2019    RTHA    LITHOTRIPSY  2019    OTHER SURGICAL HISTORY  2009    lipoma removalforehead    OTHER SURGICAL HISTORY      spinal fluid leak    DE CYSTO/URETERO W/LITHOTRIPSY &INDWELL STENT INSRT Right 3/27/2019    Procedure: CYSTOSCOPY RIGHT RETROGRADE PYELOGRAM, RIGHT URETEROSCOPY WITH LASER  LITHOTRIPSY STONE EXTRACTION AND RIGHT STENT EXCHANGE;  Surgeon: Zia Coyle MD;  Location: [facility];  Service: Urology    [facility] TOTAL HIP ARTHROPLASTY Right 10/21/2019    Procedure: RIGHT TOTAL HIP ARTHROPLASTY;  Surgeon: Conrado Gomez MD;  Location: [facility] OR;  Service: Orthopedics    [facility] TOTAL HIP ARTHROPLASTY Left 3/16/2020    Procedure: LEFT TOTAL HIP ARTHROPLASTY;  Surgeon: Conrado Gomez MD;  Location: [facility];  Service: Orthopedics       CURRENT MEDICATIONS:      Current Facility-Administered Medications:     glucose gel 15-30 g, 15-30 g, Oral, Q15 Min PRN **OR** dextrose 50 % injection 25-50 mL, 25-50 mL, Intravenous, Q15 Min PRN **OR** glucagon injection 1 mg, 1 mg, Subcutaneous, Q15 Min PRN, Prasanna Salgado,     insulin aspart (NovoLOG) injection (RAPID ACTING), 1-7 Units, Subcutaneous, TID AC, Prasanna Salgado,      insulin aspart (NovoLOG) injection (RAPID ACTING), 1-5 Units, Subcutaneous, At Bedtime, Prasanna Salgado DO    piperacillin-tazobactam (ZOSYN) 3.375 g vial to attach to  mL bag, 3.375 g, Intravenous, Q8H, Prasanna Salgado DO    Current Outpatient Medications:     buPROPion (WELLBUTRIN XL) 150 MG 24 hr tablet, Take 1 tablet (150 mg) by mouth every morning., Disp: 90 tablet, Rfl: 1    chlorthalidone (HYGROTEN) 25 MG tablet, [CHLORTHALIDONE (HYGROTEN) 25 MG TABLET] Take 25 mg by mouth daily., Disp: , Rfl:     cholecalciferol, vitamin D3, 1,000 unit (25 mcg) tablet, [CHOLECALCIFEROL, VITAMIN D3, 1,000 UNIT (25 MCG) TABLET] Take 1,000 Units by mouth daily., Disp: , Rfl:     escitalopram (LEXAPRO) 20 MG tablet, Take 1 tablet (20 mg) by mouth daily., Disp: 90 tablet, Rfl: 1    lisinopril (ZESTRIL) 20 MG tablet, TAKE 1 TABLET BY MOUTH EVERY DAY, Disp: 90 tablet, Rfl: 2    metFORMIN (GLUCOPHAGE XR) 500 MG 24 hr tablet, Take 1 tablet (500 mg) by mouth daily (with dinner)., Disp: 90 tablet, Rfl: 1    multivitamin therapeutic tablet, [MULTIVITAMIN THERAPEUTIC TABLET] Take 1 tablet by mouth daily., Disp: , Rfl:     potassium chloride jamal ER (KLOR-CON M20) 20 MEQ CR tablet, TAKE 1 TABLET BY MOUTH 2 TIMES DAILY, Disp: 180 tablet, Rfl: 2    rosuvastatin (CRESTOR) 10 MG tablet, TAKE 1 TABLET BY MOUTH EVERYDAY AT BEDTIME, Disp: 90 tablet, Rfl: 3    ALLERGIES:  No Known Allergies    FAMILY HISTORY:  History reviewed. No pertinent family history.    SOCIAL HISTORY:   Social History     Socioeconomic History    Marital status:    Tobacco Use    Smoking status: Former     Current packs/day: 0.00     Types: Cigarettes     Quit date: 1982     Years since quittin.0    Smokeless tobacco: Never    Tobacco comments:     cigars, very occasional, maybe 1 x week   Substance and Sexual Activity    Alcohol use: Not Currently    Drug use: No     Social Drivers of Health     Financial Resource Strain: Low Risk  (2025)     Financial Resource Strain     Within the past 12 months, have you or your family members you live with been unable to get utilities (heat, electricity) when it was really needed?: No   Food Insecurity: Low Risk  (1/13/2025)    Food Insecurity     Within the past 12 months, did you worry that your food would run out before you got money to buy more?: No     Within the past 12 months, did the food you bought just not last and you didn t have money to get more?: No   Transportation Needs: Low Risk  (1/13/2025)    Transportation Needs     Within the past 12 months, has lack of transportation kept you from medical appointments, getting your medicines, non-medical meetings or appointments, work, or from getting things that you need?: No   Physical Activity: Inactive (1/13/2025)    Exercise Vital Sign     Days of Exercise per Week: 0 days     Minutes of Exercise per Session: 0 min   Stress: Stress Concern Present (1/13/2025)    [place] of [employer] - Occupational Stress Questionnaire     Feeling of Stress : To some extent   Social Connections: Moderately Isolated (1/13/2025)    Social Connection and Isolation Panel [NHANES]     Frequency of Communication with Friends and Family: Never     Frequency of Social Gatherings with Friends and Family: Never     Attends Muslim Services: Never     Active Member of Clubs or Organizations: Yes     Attends Club or Organization Meetings: Never     Marital Status:    Interpersonal Safety: Not At Risk (5/22/2019)    [facility]    Humiliation, Afraid, Rape, and Kick questionnaire     Fear of Current or Ex-Partner: No     Emotionally Abused: No     Physically Abused: No     Sexually Abused: No   Housing Stability: Low Risk  (1/13/2025)    Housing Stability     Do you have housing? : Yes     Are you worried about losing your housing?: No       PHYSICAL EXAM:   "  Vitals: BP (!) 159/88   Pulse 89   Temp 99.3  F (37.4  C) (Oral)   Resp 16   Ht 1.727 m (5' 8\")   Wt 94.3 kg (208 lb)   SpO2 95%   BMI 31.63 kg/m     General:. Alert and interactive, comfortable appearing. Very pleasant appearing patient without any signs of external trauma.  HENT: Oropharynx without erythema or exudates. MMM.  TMs clear bilaterally.  Eyes: Pupils mid-sized and equally reactive.   Neck: Full AROM.  No midline tenderness to palpation.  Cardiovascular: Regular rate and rhythm. Peripheral pulses 2+ bilaterally.  Chest/Pulmonary: Normal work of breathing. Lung sounds clear and equal throughout, no wheezes or crackles. No chest wall tenderness or deformities.  Abdomen: Soft, nondistended. Nontender without guarding or rebound.  Back/Spine: No CVA or midline tenderness.  Extremities: No lower extremity edema. He does have some mild tenderness on palpation of his left shoulder passively move his shoulder without pain in his distal humerus as well with palpation.  Skin: Warm and dry. Normal skin color.   Neuro: Speech clear. CNs grossly intact.  Strength and sensation grossly intact to all extremities.   Psych: Normal affect/mood, cooperative, memory appropriate.             LAB:  All pertinent labs reviewed and interpreted.  Labs Ordered and Resulted from Time of ED Arrival to Time of ED Departure   BASIC METABOLIC PANEL - Abnormal       Result Value    Sodium 140      Potassium 3.2 (*)     Chloride 97 (*)     Carbon Dioxide (CO2) 33 (*)     Anion Gap 10      Urea Nitrogen 16.6      Creatinine 0.84      GFR Estimate >90      Calcium 9.4      Glucose 160 (*)    BLOOD GAS VENOUS - Abnormal    pH Venous 7.44 (*)     pCO2 Venous 54 (*)     pO2 Venous 52 (*)     Bicarbonate Venous 36 (*)     Base Excess/Deficit Venous 9.9 (*)     FIO2 28      Oxyhemoglobin Venous 86 (*)     O2 Sat, Venous 87.1 (*)    N TERMINAL PRO BNP OUTPATIENT - Normal    N Terminal Pro BNP Outpatient 96     INFLUENZA A/B, RSV AND " SARS-COV2 PCR - Normal    Influenza A PCR Negative      Influenza B PCR Negative      RSV PCR Negative      SARS CoV2 PCR Negative     CBC WITH PLATELETS AND DIFFERENTIAL    WBC Count 9.3      RBC Count 5.54      Hemoglobin 16.3      Hematocrit 48.7      MCV 88      MCH 29.4      MCHC 33.5      RDW 12.7      Platelet Count 227      % Neutrophils 80      % Lymphocytes 10      % Monocytes 9      % Eosinophils 0      % Basophils 0      % Immature Granulocytes 0      NRBCs per 100 WBC 0      Absolute Neutrophils 7.4      Absolute Lymphocytes 1.0      Absolute Monocytes 0.8      Absolute Eosinophils 0.0      Absolute Basophils 0.0      Absolute Immature Granulocytes 0.0      Absolute NRBCs 0.0     GLUCOSE MONITOR NURSING POCT   GLUCOSE MONITOR NURSING POCT   GLUCOSE MONITOR NURSING POCT   GLUCOSE MONITOR NURSING POCT   GLUCOSE MONITOR NURSING POCT       RADIOLOGY:  Elbow  XR, G/E 3 views, left   Final Result   IMPRESSION: Normal joint spaces and alignment. No fracture or joint effusion. A small calcific density adjacent to the medial humeral condyle is unchanged.      XR Shoulder Left G/E 3 Views   Final Result   IMPRESSION:    1. A cortical stepoff and apparent linear lucency within the most distal aspect of the left clavicle near the acromioclavicular joint, suspicious for a minimally displaced acute fracture. Recommend correlation with the site of patient's symptoms.   2. No other findings suspicious for acute fracture or malalignment of the left shoulder.       XR Chest 1 View   Final Result   IMPRESSION: Heart size and pulmonary vascularity within normal limits. Subtle hazy increased opacity left mid and lower lung could be seen with a mild or low-grade pneumonitis. Right lung is clear. No significant bony abnormalities.          EKG:  See MDM      PROCEDURES:   None      I, Wally Thompson, am serving as a scribe to document services personally performed by Dr. Fatuma Tafoya  based on my observation and the  provider's statements to me. I, Fatuma Tafoya MD attest that Wally Thompson is acting in a scribe capacity, has observed my performance of the services and has documented them in accordance with my direction.      Fatuma Tafoya M.D.  Emergency Medicine  [facility] EMERGENCY DEPARTMENT  [address] [place] [id number]  [phone]  Dept: [phone]     Fatuma Tafoya MD  01/17/25 0453

## 2025-01-17 NOTE — CONSULTS
Care Management Initial Consult    General Information  Assessment completed with: Spouse or significant other,    Type of CM/SW Visit: Initial Assessment    Primary Care Provider verified and updated as needed: Yes   Readmission within the last 30 days: no previous admission in last 30 days      Reason for Consult: discharge planning  Advance Care Planning:            Communication Assessment  Patient's communication style: spoken language (English or Bilingual)             Cognitive  Cognitive/Neuro/Behavioral: all  Level of Consciousness: intermittent confusion  Arousal Level: opens eyes spontaneously  Orientation: disoriented to, place  Mood/Behavior: flat affect, calm, cooperative     Speech: clear    Living Environment:   People in home: spouse     Current living Arrangements: independent living facility      Able to return to prior arrangements: yes       Family/Social Support:  Care provided by: spouse/significant other  Provides care for: no one, unable/limited ability to care for self  Marital Status:   Support system:            Description of Support System:           Current Resources:   Patient receiving home care services: No        Community Resources: None  Equipment currently used at home: grab bar, toilet, grab bar, tub/shower, shower chair, walker, standard  Supplies currently used at home: Incontinence Supplies    Employment/Financial:  Employment Status: retired        Financial Concerns: none           Does the patient's insurance plan have a 3 day qualifying hospital stay waiver?  No    Lifestyle & Psychosocial Needs:  Social Drivers of Health     Food Insecurity: Low Risk  (1/13/2025)    Food Insecurity     Within the past 12 months, did you worry that your food would run out before you got money to buy more?: No     Within the past 12 months, did the food you bought just not last and you didn t have money to get more?: No   Depression: Not at risk (1/16/2025)    PHQ-2     PHQ-2  Score: 0   Housing Stability: Low Risk  (1/13/2025)    Housing Stability     Do you have housing? : Yes     Are you worried about losing your housing?: No   Tobacco Use: Medium Risk (1/17/2025)    Patient History     Smoking Tobacco Use: Former     Smokeless Tobacco Use: Never     Passive Exposure: Not on file   Financial Resource Strain: Low Risk  (1/13/2025)    Financial Resource Strain     Within the past 12 months, have you or your family members you live with been unable to get utilities (heat, electricity) when it was really needed?: No   Alcohol Use: Not At Risk (10/26/2024)    AUDIT-C     Frequency of Alcohol Consumption: Never     Average Number of Drinks: Patient does not drink     Frequency of Binge Drinking: Never   Transportation Needs: Low Risk  (1/13/2025)    Transportation Needs     Within the past 12 months, has lack of transportation kept you from medical appointments, getting your medicines, non-medical meetings or appointments, work, or from getting things that you need?: No   Physical Activity: Inactive (1/13/2025)    Exercise Vital Sign     Days of Exercise per Week: 0 days     Minutes of Exercise per Session: 0 min   Interpersonal Safety: Not At Risk (5/22/2019)    [facility] [employer] [facility] [employer] [facility] [employer] [facility]    Humiliation, Afraid, Rape, and Kick questionnaire     Fear of Current or Ex-Partner: No     Emotionally Abused: No     Physically Abused: No     Sexually Abused: No   Stress: Stress Concern Present (1/13/2025)    [place] of Occupational Health - Occupational Stress Questionnaire     Feeling of Stress : To some extent   Social Connections: Moderately Isolated (1/13/2025)    Social Connection and Isolation Panel [NHANES]     Frequency of Communication with Friends and Family: Never     Frequency of Social Gatherings with Friends and Family: Never     Attends Yazidism Services: Never     Active Member of Clubs or  Organizations: Yes     Attends Club or Organization Meetings: Never     Marital Status:    Health Literacy: Not on file       Functional Status:  Prior to admission patient needed assistance:   Dependent ADLs:: Ambulation-walker, Dressing, Grooming, Incontinence, Bathing  Dependent IADLs:: Cleaning, Cooking, Laundry, Shopping, Meal Preparation, Medication Management, Money Management, Transportation, Incontinence       Mental Health Status:  Mental Health Status: No Current Concerns       Chemical Dependency Status:  Chemical Dependency Status: No Current Concerns             Values/Beliefs:  Spiritual, Cultural Beliefs, Sabianist Practices, Values that affect care: no               Discussed  Partnership in Safe Discharge Planning  document with patient/family: No    Additional Information:  CM called pt's wife Jeniffer via phone to discuss discharge planning and complete initial assessment.     Pt lives  in an ILF with wife Jeniffer . Pt uses walker for ambulation. Jeniffer assists with dressing, bathing, and all IADLs. Jeniffer states pt was supposed to have start of care appointment today with [facility]. Jeniffer is hoping pt can go to TCU at discharge. CM emailed copy of TCU list to [email or web address] at [email or web address]. CM to follow up with Jeniffer to get choices after pt is seen by PT/OT. Discussed pt's need for 3 day IP stay for pt to have TCU coverage.      Next Steps: therapy recs, medical stability, get TCU choices    11:36 AM  Jeniffer states top two choices would be [facility] and [facility]. She will review list for additional choices.     Eugenia Hernandez, LGSW

## 2025-01-17 NOTE — PLAN OF CARE
Goal Outcome Evaluation:      Plan of Care Reviewed With: patient    Overall Patient Progress: no change      NURSING PROGRESS NOTE  Shift Summary        Date: January 17, 2025     Neuro/Musculoskeletal:  A&O to self only  Cardiac:   VSS.            Respiratory:  Sating in the 90s on RA.  GI/:  Adequate urine output.  LBM: none  Diet/Appetite:  Tolerating REG diet.  Activity:  SBA  Pain:  denies  Skin:  No new deficits noted.   LDAs + Drips/IVF:  Saline locked. JAH Drain flushed.   Protocols/Labs:  K protocol. Pending results.     Pertinent Shift Updates:  PT/OT         Plan:  TCSOHAM ELIZABETH RN  ................................

## 2025-01-17 NOTE — PROGRESS NOTES
SPEECH PATHOLOGY CLINICAL SWALLOW EVALUATION       01/17/25 1000   Appointment Info   Signing Clinician's Name / Credentials (SLP) Sebastian Sunshine MA [facility] SLP   General Information   Onset of Illness/Injury or Date of Surgery 01/17/25   Referring Physician Prasanna Salgado DO   Patient/Family Therapy Goal Statement (SLP) none stated   Pertinent History of Current Problem 74 year old male with DM2, HLD, HTN, Depression, Multiple Sclerosis, Alzheimer's dementia   Who presented to the ER after falling at home. He lives with his partner, he fell on the night of 1/15/25 and hit his left shoulder. His wife gave him tylenol and put him to bed. He fell on the night of 1/16/25 after waking up in the middle of the night and landed on his left shoulder. He did not hit his head. In the ER he was found to be hypoxic, satting 86% on room air, and improved to 96% with 1L by nasal canula.  Work up showed: K of 3.2, chloride 97, CO2 33, normal WBC, negative flu, covid, RSV, CXR with Subtle hazy increased opacity left mid and lower lung could be seen with a mild or low-grade pneumonitis. Left clavicle fx. Arm in sling.   General Observations Pt alert, leaning to L. Confused but awake and following directions.   Type of Evaluation   Type of Evaluation Swallow Evaluation   Oral Motor   Oral Musculature generally intact   Structural Abnormalities none present   Mucosal Quality adequate   Dentition (Oral Motor)   Dentition (Oral Motor) natural dentition;adequate dentition   Facial Symmetry (Oral Motor)   Facial Symmetry (Oral Motor) WNL   Lip Function (Oral Motor)   Lip Range of Motion (Oral Motor) WNL   Lip Strength (Oral Motor) WFL   Tongue Function (Oral Motor)   Tongue Strength (Oral Motor) WFL   Tongue Coordination/Speed (Oral Motor) reduced rate   Tongue ROM (Oral Motor) WNL   Jaw Function (Oral Motor)   Jaw Function (Oral Motor) WNL   Vocal Quality/Secretion Management (Oral Motor)   Vocal Quality (Oral Motor) WFL   Secretion  Management (Oral Motor) WNL   General Swallowing Observations   Past History of Dysphagia none known   Respiratory Support nasal cannula   Current Diet/Method of Nutritional Intake (General Swallowing Observations, NIS) thin liquids (level 0);regular diet   Swallowing Evaluation Clinical swallow evaluation   Clinical Swallow Evaluation   Feeding Assistance frequent cues/help required   Clinical Swallow Evaluation Textures Trialed thin liquids;solid foods   Clinical Swallow Eval: Thin Liquid Texture Trial   Mode of Presentation, Thin Liquids straw   Volume of Liquid or Food Presented 4 oz   Oral Phase of Swallow WFL   Pharyngeal Phase of Swallow intact   Diagnostic Statement WFL   Clinical Swallow Evaluation: Solid Food Texture Trial   Mode of Presentation self-fed   Volume Presented gustavo crackers   Oral Phase WFL   Pharyngeal Phase intact   Diagnostic Statement WFL   Esophageal Phase of Swallow   Patient reports or presents with symptoms of esophageal dysphagia No   Swallowing Recommendations   Diet Consistency Recommendations thin liquids (level 0);regular diet   Comment, Swallowing Recommendations No dysphagia concerns, no overt s/s aspiration. Recommend continue regular diet w/thin liquids   Clinical Impression   Criteria for Skilled Therapeutic Interventions Met (SLP Eval) Evaluation only   Clinical Impression Comments Pt is awake/alert, confused but follow directions. He is able to feed himself with set up and min cue. He demonstrates good rate of intake and bolus volumes. Mastication is intact. Oral bolus control is intact with solids and liquids. There is good oral clearance after swallows. There is no overt s/s aspiration with any consistency even with rapid consecutive swallows of thin liquids by straw. No ongoing SLP needs.   SLP Total Evaluation Time   Eval: oral/pharyngeal swallow function, clinical swallow Minutes (25736) 16   SLP Discharge Planning   SLP Plan eval only   SLP Rationale for DC Rec per  care team   SLP Brief overview of current status  No dysphagia concerns, no overt s/s aspiration. Recommend continue regular diet w/thin liquids   SLP Time and Intention   Total Session Time (sum of timed and untimed services) 16

## 2025-01-17 NOTE — ED NOTES
Pts wife was called and updated on pt status. I also asked wife if she was coming down to see pt as pt was asking.  Wife thinks pt will get worse  and get agitated about going home if she comes, so she is not coming today.  She said to tell him that she will call him once he gets into  a room. I told pt that.  He needs things repeated, though,  as he forgets things.

## 2025-01-17 NOTE — PROGRESS NOTES
Occupational Therapy       01/17/25 1310   Appointment Info   Signing Clinician's Name / Credentials (OT) Renea Scanlon OTR/L   Living Environment   People in Home spouse   Current Living Arrangements apartment   Living Environment Comments Patient confused unable to provide home history, patient has dementia at baseline   Self-Care   Equipment Currently Used at Home walker, rolling  (unsure if FWW vs 4WW)   General Information   Onset of Illness/Injury or Date of Surgery 01/17/25   Referring Physician Prasanna Salgado DO   Patient/Family Therapy Goal Statement (OT) none stated   Additional Occupational Profile Info/Pertinent History of Current Problem 74-year-old male with left acute closed nondisplaced distal clavicular fracture   Existing Precautions/Restrictions weight bearing  (Sling to LUE)   Left Upper Extremity (Weight-bearing Status) non weight-bearing (NWB)   Cognitive Status Examination   Orientation Status not oriented to person, place or time   Cognitive Status Comments Patietn repeating same questions, wanting to leave   Range of Motion Comprehensive   Comment, General Range of Motion R WNL-left not tested due to fracture   Strength Comprehensive (MMT)   Comment, General Manual Muscle Testing (MMT) Assessment R WNL-left not tested due to fracture   Bed Mobility   Bed Mobility supine-sit;sit-supine   Supine-Sit [place] (Bed Mobility) maximum assist (25% patient effort);2 person assist   Sit-Supine [place] (Bed Mobility) maximum assist (25% patient effort);2 person assist   Transfers   Transfers sit-stand transfer   Sit-Stand Transfer   Sit-Stand [place] (Transfers) maximum assist (25% patient effort);2 person assist   Sit/Stand Transfer Comments Attempted 2 times, patient continues to use LUE   Activities of Daily Living   BADL Assessment/Intervention lower body dressing   Lower Body Dressing Assessment/Training   [place] Level (Lower Body Dressing) socks;maximum assist (25%  patient effort)   Clinical Impression   Criteria for Skilled Therapeutic Interventions Met (OT) Yes, treatment indicated   OT Diagnosis decreased ADL independence   OT Problem List-Impairments impacting ADL problems related to;activity tolerance impaired;balance;pain   Assessment of Occupational Performance 3-5 Performance Deficits   Identified Performance Deficits trsfs, dressing, bed mob   Planned Therapy Interventions (OT) ADL retraining;balance training;bed mobility training;transfer training   Clinical Decision Making Complexity (OT) detailed assessment/moderate complexity   Risk & Benefits of therapy have been explained evaluation/treatment results reviewed;care plan/treatment goals reviewed   OT Total Evaluation Time   OT Eval, Moderate Complexity Minutes (37743) 20   OT Goals   Therapy Frequency (OT) 5 times/week   OT Predicted Duration/Target Date for Goal Attainment 01/24/25   OT Goals Upper Body Dressing;Transfers;Bed Mobility   OT: Upper Body Dressing Moderate assist   OT: Bed Mobility Moderate assist   OT: Transfer Moderate assist   OT Discharge Planning   OT Plan Trsfs, bed mobility (Ax2)   OT Discharge Recommendation (DC Rec) Transitional Care Facility   OT Rationale for DC Rec Patient currently requiring heavy assist of 2 for standing, unable to ambulate.   OT Brief overview of current status Max A x2 for bed mobility/trsfs   Total Session Time   Total Session Time (sum of timed and untimed services) 20

## 2025-01-17 NOTE — ED NOTES
Patient examined, evaluated, and educated on discharge prescriptions and instructions by FRED Magallanes. Patient discharged to [facility] by FRED Magallanes.   Report given to MARCE Parada.

## 2025-01-17 NOTE — CONSULTS
ORTHOPEDIC CONSULTATION    Consultation  Iraj De Leon,  1950, MRN [id number]    Hypoxia [R09.02]  Closed nondisplaced fracture of acromial end of left clavicle, initial encounter [S42.035A]    PCP: Dez Sinclair, [phone]   Code status:  Full Code       Extended Emergency Contact Information  Primary Emergency Contact: Jeniffer De Leon  Address: [address]           [place] [address] [facility]  Home Phone: [phone]  Mobile Phone: [phone]  Relation: Spouse  Secondary Emergency Contact: Carol Sheridan  Address: [address] [place] [address] [facility]  Home Phone: [phone]  Mobile Phone: [phone]  Relation: Cousin         IMPRESSION:  74-year-old male with left acute closed nondisplaced distal clavicular fracture     PLAN:  This patient was discussed with Dr. Skelton, on-call surgeon for [facility] and they are in agreement with the following plan.   -No indication for urgent surgical intervention at this time.  Will plan for nonoperative management.  -Nonweightbearing left upper extremity  -Sling at all times to the left upper extremity  -PT/OT  -Pain control per primary  -Orthopedically stable for discharge.  Likely will need TCU at discharge.  -Follow-up in 2 to 3 weeks with a shoulder surgeon at [facility].    Ortho will sign off. Please feel free to reach out with any further questions or concerns.       Thank you for including [facility] in the care of Iraj De Leon. It has been a pleasure participating in their care.        CHIEF COMPLAINT: <principal problem not specified>    HISTORY OF PRESENT ILLNESS:  The patient is seen in orthopedic consultation at the request of Prasanna Salgado DO for left clavicle fracture.  The patient is a 74 year old male    The patient presents today with pain in his left shoulder.  He had a fall yesterday.  He notes that he slipped and fell directly onto the left shoulder.  He had  "immediate onset of severe pain and was brought into the emergency department where radiographs demonstrated a clavicle fracture.  He is in a sling during the time my visit is fairly comfortable in the sling.  Denies any history of injuries to this left shoulder in the past.  He seems a bit confused during my visit, further history was obtained from his wife over the phone.  They live together in independent living facility.  Patient has poor balance and typically ambulates with assistance of a walker.  Denies use of anticoagulation.      ALLERGIES:   Review of patient's allergies indicates No Known Allergies      MEDICATIONS UPON ADMISSION:  Medications were reviewed.  They include:   (Not in a hospital admission)        SOCIAL HISTORY:   he  reports that he quit smoking about 43 years ago. He has never used smokeless tobacco. He reports that he does not currently use alcohol. He reports that he does not use drugs.      FAMILY HISTORY:  family history is not on file.      REVIEW OF SYSTEMS:   Reviewed with patient. See HPI, otherwise negative       PHYSICAL EXAMINATION:  Vitals: BP (!) 166/87   Pulse 80   Temp 99.3  F (37.4  C) (Oral)   Resp 21   Ht 1.727 m (5' 8\")   Wt 94.3 kg (208 lb)   SpO2 94%   BMI 31.63 kg/m    General: On examination, the patient is resting comfortably, NAD, awake, and alert and oriented to person, place, time, and, and general circumstances   SKIN: There is no evidence of erythema, warmth, ecchymosis, swelling, deformity, crepitus, break to the skin, open wound.  Pulses:  Brachial and radial pulse is intact and equal bilaterally  Sensation: intact and equal bilaterally to the distal upper extremities.  Tenderness: Tender to palpation of the distal aspect of the clavicle.  No other bony tenderness.  ROM: Wiggles fingers.  Flexion/extension of the wrist intact.   strength intact flexion/extension/abduction/adduction/inversion/eversion without pain.     Contralateral side= Full range " of motion, Negative joint instability findings, 5/5 motor groups about the joint, Non-tender.       RADIOGRAPHIC EVALUATION:  Personally reviewed  EXAM: LEFT SHOULDER 3 VIEWS  LOCATION: [facility]  DATE/TIME: 1/17/2025 2:54 AM CST     INDICATION: Fall. Shoulder pain.  COMPARISON: None.                                                                      IMPRESSION:   1. A cortical stepoff and apparent linear lucency within the most distal aspect of the left clavicle near the acromioclavicular joint, suspicious for a minimally displaced acute fracture. Recommend correlation with the site of patient's symptoms.  2. No other findings suspicious for acute fracture or malalignment of the left shoulder.     PERTINENT LABS:  Personally reviewed  Recent Labs   Lab Test 01/17/25  0211 03/17/20  0604 03/16/20  0637   INR  --   --  0.97   HGB 16.3   < >  --       < >  --     < > = values in this interval not displayed.         AMMON REINOSO PA-C  Date: 1/17/2025  Time: 12:26 PM  [facility]    CC1:   Prasanna Salgado DO    CC2:   Dez Sinclair

## 2025-01-17 NOTE — PROGRESS NOTES
"   01/17/25 1311   Appointment Info   Signing Clinician's Name / Credentials (PT) Holly Vazquez, PT, DPT   Living Environment   People in Home spouse   Current Living Arrangements independent living facility   Home Accessibility no concerns   Self-Care   Equipment Currently Used at Home walker, rolling   Fall history within last six months yes   Number of times patient has fallen within last six months 1  (at least)   Activity/Exercise/Self-Care Comment Pt confused, unable to provide PLOF information. Above information obtained from chart review.   General Information   Onset of Illness/Injury or Date of Surgery 01/17/25   Referring Physician Prasanna Salgado DO   Patient/Family Therapy Goals Statement (PT) None stated.   Pertinent History of Current Problem (include personal factors and/or comorbidities that impact the POC) Per H&P: \"74 year old male with DM2, HLD, HTN, Depression, Multiple Sclerosis, Alzheimer's dementia   Who presented to the ER after falling at home.   He lives with his partner, he fell on the night of 1/15/25 and hit his left shoulder. His wife gave him tylenol and put him to bed. He fell on the night of 1/16/25 after waking up in the middle of the night and landed on his left shoulder. He did not hit his head.   In the ER he was found to be hypoxic, satting 86% on room air, and improved to 96% with 1L by nasal canula.  Work up showed: K of 3.2, chloride 97, CO2 33, normal WBC, negative flu, covid, RSV, CXR with Subtle hazy increased opacity left mid and lower lung could be seen with a mild or low-grade pneumonitis. Left shoulder X-ray showed: A cortical stepoff and apparent linear lucency within the most distal aspect of the left clavicle near the acromioclavicular joint, suspicious for a minimally displaced acute fracture. He was placed in a sling and given Zosyn.   Patient admitted for work up and management of falls, pneumonitis, acute hypoxic respiratory failure, clavicle fracture  \" "   Existing Precautions/Restrictions fall;weight bearing  (immobilizer)   Weight-Bearing Status - LUE nonweight-bearing   Cognition   Affect/Mental Status (Cognition) confused   Cognitive Status Comments Pt asks repeatedly to be brought to the front of the hospital.   Bed Mobility   Bed Mobility supine-sit;sit-supine   Supine-Sit [place] (Bed Mobility) maximum assist (25% patient effort);2 person assist   Sit-Supine [place] (Bed Mobility) maximum assist (25% patient effort);2 person assist   Bed Mobility Limitations decreased ability to use arms for pushing/pulling;decreased ability to use legs for bridging/pushing   Impairments Contributing to Impaired Bed Mobility pain;decreased strength   Assistive Device (Bed Mobility) draw sheet   Comment, (Bed Mobility) Pt resistive with mobility.   Transfers   Transfers sit-stand transfer   Transfer Safety Concerns Noted decreased weight-shifting ability   Impairments Contributing to Impaired Transfers impaired balance;decreased strength   Sit-Stand Transfer   Sit-Stand [place] (Transfers) maximum assist (25% patient effort);2 person assist   Assistive Device (Sit-Stand Transfers) walker, candelaria   Gait/Stairs (Locomotion)   Comment, (Gait/Stairs) Pt unable to ambulate due to poor standing tolerance.   Clinical Impression   Criteria for Skilled Therapeutic Intervention Yes, treatment indicated   PT Diagnosis (PT) impaired functional mobility   Influenced by the following impairments decreased strength, impaired balance, NWB restriction   Functional limitations due to impairments transfers, ambulation   Clinical Presentation (PT Evaluation Complexity) evolving   Clinical Presentation Rationale Clinical judgment.   Clinical Decision Making (Complexity) moderate complexity   Planned Therapy Interventions (PT) balance training;bed mobility training;gait training;home exercise program;neuromuscular re-education;patient/family education;strengthening;ROM (range of  motion);transfer training   Risk & Benefits of therapy have been explained evaluation/treatment results reviewed;participants voiced agreement with care plan;participants included;patient   PT Total Evaluation Time   PT Reenaal, Moderate Complexity Minutes (02185) 20   Physical Therapy Goals   PT Frequency 5x/week   PT Predicted Duration/Target Date for Goal Attainment 01/24/25   PT Goals Bed Mobility;Transfers;Gait   PT: Bed Mobility Moderate assist;Supine to/from sit   PT: Transfers Moderate assist;Sit to/from stand;Assistive device   PT: Gait Moderate assist;Assistive device;Timothy walker;10 feet   PT Discharge Planning   PT Plan progress mobility as able   PT Discharge Recommendation (DC Rec) Transitional Care Facility   PT Rationale for DC Rec Pt requiring significant hands on assist of 2 for sit <> stand transfers. Unable to ambulate at this time. Recommend TCU at discharge.   PT Brief overview of current status Supine <> sit, max assist of 2. Sit <> stand, max assist of 2.   PT Equipment Needed at Discharge lift device   Physical Therapy Time and Intention   Total Session Time (sum of timed and untimed services) 20

## 2025-01-17 NOTE — PROGRESS NOTES
Pt arrived from ED at 1500.  Pt alert to self only.  Pt denied pain and appears comfortable.  ED nurse reported incont. Of B&B.  Requires Oxygen 1.5l/nc at this time.  Came up on room air and O2 sat was 83%, oxygen applied and O2 sat 93% on 1.5l/nc.  Call light within reach and helped pt call wife.

## 2025-01-17 NOTE — H&P
[facility]    History and Physical - Hospitalist Service       Date of Admission:  1/17/2025    Assessment & Plan      Iraj De Leon is a 74 year old male with DM2, HLD, HTN, Depression, Multiple Sclerosis, Alzheimer's dementia   Who presented to the ER after falling at home.   He lives with his partner, he fell on the night of 1/15/25 and hit his left shoulder. His wife gave him tylenol and put him to bed. He fell on the night of 1/16/25 after waking up in the middle of the night and landed on his left shoulder. He did not hit his head.   In the ER he was found to be hypoxic, satting 86% on room air, and improved to 96% with 1L by nasal canula.  Work up showed: K of 3.2, chloride 97, CO2 33, normal WBC, negative flu, covid, RSV, CXR with Subtle hazy increased opacity left mid and lower lung could be seen with a mild or low-grade pneumonitis. Left shoulder X-ray showed: A cortical stepoff and apparent linear lucency within the most distal aspect of the left clavicle near the acromioclavicular joint, suspicious for a minimally displaced acute fracture. He was placed in a sling and given Zosyn.   Patient admitted for work up and management of falls, pneumonitis, acute hypoxic respiratory failure, clavicle fracture    Falls, MS, Clavicular fracture  - PT / OT / CM  - keep arm in sling  - orthopedics consulted  - tylenol for pain control    Acute hypoxic respiratory failure, pneumonitis  - zosyn   - O2 as needed to keep sats > 90%  - incentive spirometer  - speech evaluation    Depression  - continued on buproprion, escitalopram    Hypokalemia  - replacement protocol    HTN  - continued on lisinopril  - chlorthalidone held    DM2  - carb consistent diet  - Metformin held  - sliding scale insulin     HLD  - continued on rosuvastatin          Diet:    DVT Prophylaxis: Enoxaparin (Lovenox) SQ  Acosta Catheter: Not present  Lines: None     Cardiac Monitoring: None  Code Status:      Clinically  "Significant Risk Factors Present on Admission        # Hypokalemia: Lowest K = 3.2 mmol/L in last 2 days, will replace as needed   # Hypochloremia: Lowest Cl = 97 mmol/L in last 2 days, will monitor as appropriate          # Hypertension: Noted on problem list     # Dementia: noted on problem list      # DMII: A1C = 7.4 % (Ref range: 0.0 - 5.6 %) within past 6 months    # Obesity: Estimated body mass index is 31.63 kg/m  as calculated from the following:    Height as of this encounter: 1.727 m (5' 8\").    Weight as of this encounter: 94.3 kg (208 lb).       # Financial/Environmental Concerns:           Disposition Plan     Medically Ready for Discharge: Anticipated in 2-4 Days           Prasanna Salgado DO  Hospitalist Service  [facility]  Securely message with [employer] (more info)  Text page via [facility] Paging/Directory     ______________________________________________________________________    Chief Complaint   Fall    History is obtained from the patient    History of Present Illness   Iraj De Loen is a 74 year old male with DM2, HLD, HTN, Depression, Multiple Sclerosis, Alzheimer's dementia   Who presented to the ER after falling at home.   He lives with his partner, he fell on the night of 1/15/25 and hit his left shoulder. His wife gave him tylenol and put him to bed. He fell on the night of 1/16/25 after waking up in the middle of the night and landed on his left shoulder. He did not hit his head.   In the ER he was found to be hypoxic, satting 86% on room air, and improved to 96% with 1L by nasal canula.  Work up showed: K of 3.2, chloride 97, CO2 33, normal WBC, negative flu, covid, RSV, CXR with Subtle hazy increased opacity left mid and lower lung could be seen with a mild or low-grade pneumonitis. Left shoulder X-ray showed: A cortical stepoff and apparent linear lucency within the most distal aspect of the left clavicle near the acromioclavicular joint, suspicious for a " minimally displaced acute fracture. He was placed in a sling and given Zosyn.   Patient admitted for work up and management of falls, pneumonitis, acute hypoxic respiratory failure, clavicle fracture    Past Medical History    Past Medical History:   Diagnosis Date    Arthritis     Cancer (H) 02/2020    basal cell Moh's forehead    Diabetes mellitus (H)     type 2, diet controlled as of March 2019    Headache 2014    Due to spontaneous spinal fluid leak    Hyperlipidemia     Hypertension     Kidney stones 2019    Multiple sclerosis (H)     Osteoarthritis     bilateral hips    Pituitary microadenoma (H)     Posterior vitreous detachment      Past Surgical History   Past Surgical History:   Procedure Laterality Date    BASAL CELL CARCINOMA EXCISION  02/20/2020    Moh's procedure to forehead    CHOLECYSTECTOMY      COLONOSCOPY      EYE SURGERY Right     cataract    IR LUMBAR PUNCTURE  3/28/2014    JOINT REPLACEMENT  10/2019    RTHA    LITHOTRIPSY  2019    OTHER SURGICAL HISTORY  2009    lipoma removalforehead    OTHER SURGICAL HISTORY      spinal fluid leak    [place] CYSTO/URETERO W/LITHOTRIPSY &INDWELL STENT INSRT Right 3/27/2019    Procedure: CYSTOSCOPY RIGHT RETROGRADE PYELOGRAM, RIGHT URETEROSCOPY WITH LASER  LITHOTRIPSY STONE EXTRACTION AND RIGHT STENT EXCHANGE;  Surgeon: Zia Coyle MD;  Location: [facility] Main OR;  Service: Urology    [facility] TOTAL HIP ARTHROPLASTY Right 10/21/2019    Procedure: RIGHT TOTAL HIP ARTHROPLASTY;  Surgeon: Conrado Gomez MD;  Location: [facility] OR;  Service: Orthopedics    [facility] TOTAL HIP ARTHROPLASTY Left 3/16/2020    Procedure: LEFT TOTAL HIP ARTHROPLASTY;  Surgeon: Conrado Gomez MD;  Location: [facility] OR;  Service: Orthopedics     Prior to Admission Medications   Prior to Admission Medications   Prescriptions Last Dose Informant Patient Reported? Taking?   Td (tetanus & diphtheria toxoids) -  adult formulation - for ages 7 years and older   No No   Sig:  Inject 0.5 mLs into the muscle once for 1 dose.   buPROPion (WELLBUTRIN XL) 150 MG 24 hr tablet   No No   Sig: Take 1 tablet (150 mg) by mouth every morning.   chlorthalidone (HYGROTEN) 25 MG tablet   Yes No   Sig: [CHLORTHALIDONE (HYGROTEN) 25 MG TABLET] Take 25 mg by mouth daily.   cholecalciferol, vitamin D3, 1,000 unit (25 mcg) tablet   Yes No   Sig: [CHOLECALCIFEROL, VITAMIN D3, 1,000 UNIT (25 MCG) TABLET] Take 1,000 Units by mouth daily.   escitalopram (LEXAPRO) 20 MG tablet   No No   Sig: Take 1 tablet (20 mg) by mouth daily.   lisinopril (ZESTRIL) 20 MG tablet   No No   Sig: TAKE 1 TABLET BY MOUTH EVERY DAY   metFORMIN (GLUCOPHAGE XR) 500 MG 24 hr tablet   No No   Sig: Take 1 tablet (500 mg) by mouth daily (with dinner).   multivitamin therapeutic tablet   Yes No   Sig: [MULTIVITAMIN THERAPEUTIC TABLET] Take 1 tablet by mouth daily.   potassium chloride jamal ER (KLOR-CON M20) 20 MEQ CR tablet   No No   Sig: TAKE 1 TABLET BY MOUTH 2 TIMES DAILY   rosuvastatin (CRESTOR) 10 MG tablet   No No   Sig: TAKE 1 TABLET BY MOUTH EVERYDAY AT BEDTIME      Facility-Administered Medications: None      Social History   I have reviewed this patient's social history and updated it with pertinent information if needed.  Social History     Tobacco Use    Smoking status: Former     Current packs/day: 0.00     Types: Cigarettes     Quit date: 1982     Years since quittin.0    Smokeless tobacco: Never    Tobacco comments:     cigars, very occasional, maybe 1 x week   Substance Use Topics    Alcohol use: Not Currently    Drug use: No     Family History       Allergies   No Known Allergies     Physical Exam   Vital Signs: Temp: 99.3  F (37.4  C) Temp src: Oral BP: (!) 159/88 Pulse: 89   Resp: 16 SpO2: 95 % O2 Device: Nasal cannula Oxygen Delivery: 1 LPM  Weight: 208 lbs 0 oz    GEN: in no acute distress, pleasantly demented   HEENT: Moist mucus membranes, anicteric sclerae  NECK: symmetric without tracheal deviation  CV:  reg rhythm, normal rate, audible s1 and s2, no murmurs / rubs / gallops  RESP: clear to auscultation, no rhonchi / rales / wheezes  ABD: non-distended  EXT: no peripheral edema, left arm in sling  SKIN: no suspicious skin findings, warm  NEURO: moves all four extremities, no focal deficits      Medical Decision Making       70 MINUTES SPENT BY ME on the date of service doing chart review, history, exam, documentation & further activities per the note.      Data   ------------------------- PAST 24 HR DATA REVIEWED -----------------------------------------------    I have personally reviewed the following data over the past 24 hrs:    9.3  \   16.3   / 227     140 97 (L) 16.6 /  160 (H)   3.2 (L) 33 (H) 0.84 \     Trop: N/A BNP: 96       Imaging results reviewed over the past 24 hrs:   Recent Results (from the past 24 hours)   XR Chest 1 View    Narrative    EXAM: XR CHEST 1 VIEW  LOCATION: [facility]  DATE: 1/17/2025    INDICATION: Hypoxia.  COMPARISON: 6/19/2023.      Impression    IMPRESSION: Heart size and pulmonary vascularity within normal limits. Subtle hazy increased opacity left mid and lower lung could be seen with a mild or low-grade pneumonitis. Right lung is clear. No significant bony abnormalities.   XR Shoulder Left G/E 3 Views    Narrative    EXAM: LEFT SHOULDER 3 VIEWS  LOCATION: [facility]  DATE/TIME: 1/17/2025 2:54 AM CST    INDICATION: Fall. Shoulder pain.  COMPARISON: None.      Impression    IMPRESSION:   1. A cortical stepoff and apparent linear lucency within the most distal aspect of the left clavicle near the acromioclavicular joint, suspicious for a minimally displaced acute fracture. Recommend correlation with the site of patient's symptoms.  2. No other findings suspicious for acute fracture or malalignment of the left shoulder.    Elbow  XR, G/E 3 views, left    Narrative    EXAM: XR ELBOW LEFT G/E 3 VIEWS  LOCATION: [facility]  DATE: 1/17/2025    INDICATION: trauma, fall  COMPARISON: 07/04/2024      Impression    IMPRESSION: Normal joint spaces and alignment. No fracture or joint effusion. A small calcific density adjacent to the medial humeral condyle is unchanged.

## 2025-01-17 NOTE — ED TRIAGE NOTES
Pt brought in via EMS.  Pt c/o L shoulder pain d/t fallx2 tonight. EMS reports pt fell earlier in the day and wife gave him APAP and sent him to bed. At 0100 pt was confused and got up from bed, and lost his footing. Denies hitting head or loss of consciousness. Pt not on thinners. Continues to c/o L shoulder pain.     Triage Assessment (Adult)       Row Name 01/17/25 0157          Triage Assessment    Airway WDL WDL        Respiratory WDL    Respiratory WDL WDL        Skin Circulation/Temperature WDL    Skin Circulation/Temperature WDL WDL        Cardiac WDL    Cardiac WDL WDL        Cognitive/Neuro/Behavioral WDL    Cognitive/Neuro/Behavioral WDL all     Level of Consciousness intermittent confusion     Arousal Level opens eyes spontaneously     Orientation disoriented to;place     Speech clear     Mood/Behavior flat affect;calm;cooperative

## 2025-01-17 NOTE — PLAN OF CARE
Goal Outcome Evaluation:      Plan of Care Reviewed With: spouse          Outcome Evaluation: Goal is TCU

## 2025-01-17 NOTE — MEDICATION SCRIBE - ADMISSION MEDICATION HISTORY
Medication Scribe Admission Medication History    Admission medication history is complete. The information provided in this note is only as accurate as the sources available at the time of the update.    Information Source(s): Family member via phone    Pertinent Information: Patient's spouse provided updated PTA med list over the phone    Changes made to PTA medication list:  Added: None  Deleted: None  Changed: None    Allergies reviewed with patient and updates made in EHR: yes    Medication History Completed By: Mustapha Boateng 1/17/2025 5:54 AM    PTA Med List   Medication Sig Last Dose/Taking    buPROPion (WELLBUTRIN XL) 150 MG 24 hr tablet Take 1 tablet (150 mg) by mouth every morning. 1/16/2025 Morning    chlorthalidone (HYGROTEN) 25 MG tablet [CHLORTHALIDONE (HYGROTEN) 25 MG TABLET] Take 25 mg by mouth daily. 1/16/2025 Morning    cholecalciferol, vitamin D3, 1,000 unit (25 mcg) tablet [CHOLECALCIFEROL, VITAMIN D3, 1,000 UNIT (25 MCG) TABLET] Take 1,000 Units by mouth daily. 1/16/2025 Morning    escitalopram (LEXAPRO) 20 MG tablet Take 1 tablet (20 mg) by mouth daily. 1/16/2025 Morning    lisinopril (ZESTRIL) 20 MG tablet TAKE 1 TABLET BY MOUTH EVERY DAY 1/16/2025 Morning    metFORMIN (GLUCOPHAGE XR) 500 MG 24 hr tablet Take 1 tablet (500 mg) by mouth daily (with dinner). 1/16/2025 Evening    multivitamin therapeutic tablet [MULTIVITAMIN THERAPEUTIC TABLET] Take 1 tablet by mouth daily. 1/16/2025 Morning    potassium chloride jamal ER (KLOR-CON M20) 20 MEQ CR tablet Take 20 mEq by mouth daily. 1/16/2025 Morning    rosuvastatin (CRESTOR) 10 MG tablet TAKE 1 TABLET BY MOUTH EVERYDAY AT BEDTIME 1/16/2025 Bedtime

## 2025-01-17 NOTE — ED NOTES
Wife Jeniffer updated via phone call to [phone]. She expressed understanding about the pt being admitted.

## 2025-01-17 NOTE — ED NOTES
"[facility] ED Handoff Report    ED Chief Complaint: Fall    ED Diagnosis:  (R09.02) Hypoxia  Comment: placed on 2L via NC d/t O2 in the 80s on RA. Pt is normally on RA.  Plan: Continue to monitor. Have attempted to wean pt to RA without success.    (S42.035A) Closed nondisplaced fracture of acromial end of left clavicle, initial encounter  Comment: L shoulder pain. Arm placed in sling and pain medication administered.  Plan: Consult orthopedics, and continue to monitor.       PMH:    Past Medical History:   Diagnosis Date    Arthritis     Cancer (H) 02/2020    basal cell Moh's forehead    Diabetes mellitus (H)     type 2, diet controlled as of March 2019    Headache 2014    Due to spontaneous spinal fluid leak    Hyperlipidemia     Hypertension     Kidney stones 2019    Multiple sclerosis (H)     Osteoarthritis     bilateral hips    Pituitary microadenoma (H)     Posterior vitreous detachment         Code Status:  No Order     Falls Risk: Yes Band: Applied    Current Living Situation/Residence: lives with a significant other and lives in an assisted living facility on the independent living side.    Elimination Status: Continent: wears briefs     Activity Level: SBA w/ walker    Patients Preferred Language:  English     Needed: No    Vital Signs:  BP (!) 159/88   Pulse 78   Temp 99.3  F (37.4  C) (Oral)   Resp 16   Ht 1.727 m (5' 8\")   Wt 94.3 kg (208 lb)   SpO2 96%   BMI 31.63 kg/m       Cardiac Rhythm: Sinus Rhythm    Pain Score: 0/10    Is the Patient Confused:  Yes, pt has intermittent confusion. He is alert to himself and situation but not to location.    Last Food or Drink: 01/17/25 at     Focused Assessment:  Thursday during the day, the pt fell and had complaints of L shoulder pain. Wife gave him APAP and got him to bed. Pt got up at 0100 confused thinking it was time to get up for the day, lost his balance and slipped. Pt denies loss of consciousness or hitting head. Pt is not on " thinners. Continues with c/o L shoulder pain. L arm was placed in sling d/t xray showing fracture.     Tests Performed: Done: Labs and Imaging    Treatments Provided:  Sling, APAP, Potassium replacement, and antibiotics.    Family Dynamics/Concerns: No    Family Updated On Visitor Policy: Yes    Plan of Care Communicated to Family: Yes    Who Was Updated about Plan of Care: Pt and family called.    Belongings Checklist Done and Signed by Patient: N/A    Medications sent with patient: Insulin pen    Covid: asymptomatic , negative    Additional Information: N/A    RN: Maria A Roy RN   1/17/2025 5:04 AM

## 2025-01-17 NOTE — ED NOTES
Pt sleeping comfortably. Woke up to give him his pills. Cooperative.  Pt stated he wanted to go home with his  wife.  Explained to pt he needd to be treated for pneumonia than he could go home to her. I told him she was aware he was here.  Pt reoriented after that.  Bs 141 this am. Holding am insulin until after his tray comes and he eats it.

## 2025-01-18 LAB
ALBUMIN SERPL BCG-MCNC: 3.9 G/DL (ref 3.5–5.2)
ALP SERPL-CCNC: 110 U/L (ref 40–150)
ALT SERPL W P-5'-P-CCNC: 29 U/L (ref 0–70)
ANION GAP SERPL CALCULATED.3IONS-SCNC: 9 MMOL/L (ref 7–15)
AST SERPL W P-5'-P-CCNC: 19 U/L (ref 0–45)
BASOPHILS # BLD AUTO: 0 10E3/UL (ref 0–0.2)
BASOPHILS NFR BLD AUTO: 0 %
BILIRUB SERPL-MCNC: 0.7 MG/DL
BUN SERPL-MCNC: 9.9 MG/DL (ref 8–23)
CALCIUM SERPL-MCNC: 9 MG/DL (ref 8.8–10.4)
CHLORIDE SERPL-SCNC: 100 MMOL/L (ref 98–107)
CREAT SERPL-MCNC: 0.74 MG/DL (ref 0.67–1.17)
EGFRCR SERPLBLD CKD-EPI 2021: >90 ML/MIN/1.73M2
EOSINOPHIL # BLD AUTO: 0.1 10E3/UL (ref 0–0.7)
EOSINOPHIL NFR BLD AUTO: 1 %
ERYTHROCYTE [DISTWIDTH] IN BLOOD BY AUTOMATED COUNT: 12.6 % (ref 10–15)
GLUCOSE BLDC GLUCOMTR-MCNC: 143 MG/DL (ref 70–99)
GLUCOSE BLDC GLUCOMTR-MCNC: 153 MG/DL (ref 70–99)
GLUCOSE BLDC GLUCOMTR-MCNC: 153 MG/DL (ref 70–99)
GLUCOSE BLDC GLUCOMTR-MCNC: 195 MG/DL (ref 70–99)
GLUCOSE BLDC GLUCOMTR-MCNC: 220 MG/DL (ref 70–99)
GLUCOSE SERPL-MCNC: 161 MG/DL (ref 70–99)
HCO3 SERPL-SCNC: 30 MMOL/L (ref 22–29)
HCT VFR BLD AUTO: 48.7 % (ref 40–53)
HGB BLD-MCNC: 16.2 G/DL (ref 13.3–17.7)
IMM GRANULOCYTES # BLD: 0 10E3/UL
IMM GRANULOCYTES NFR BLD: 0 %
LYMPHOCYTES # BLD AUTO: 0.8 10E3/UL (ref 0.8–5.3)
LYMPHOCYTES NFR BLD AUTO: 12 %
MCH RBC QN AUTO: 29.3 PG (ref 26.5–33)
MCHC RBC AUTO-ENTMCNC: 33.3 G/DL (ref 31.5–36.5)
MCV RBC AUTO: 88 FL (ref 78–100)
MONOCYTES # BLD AUTO: 0.6 10E3/UL (ref 0–1.3)
MONOCYTES NFR BLD AUTO: 9 %
NEUTROPHILS # BLD AUTO: 5.4 10E3/UL (ref 1.6–8.3)
NEUTROPHILS NFR BLD AUTO: 78 %
NRBC # BLD AUTO: 0 10E3/UL
NRBC BLD AUTO-RTO: 0 /100
PLATELET # BLD AUTO: 216 10E3/UL (ref 150–450)
POTASSIUM SERPL-SCNC: 3.5 MMOL/L (ref 3.4–5.3)
POTASSIUM SERPL-SCNC: 3.7 MMOL/L (ref 3.4–5.3)
PROT SERPL-MCNC: 6.2 G/DL (ref 6.4–8.3)
RBC # BLD AUTO: 5.53 10E6/UL (ref 4.4–5.9)
SODIUM SERPL-SCNC: 139 MMOL/L (ref 135–145)
WBC # BLD AUTO: 7 10E3/UL (ref 4–11)

## 2025-01-18 PROCEDURE — 250N000011 HC RX IP 250 OP 636: Performed by: INTERNAL MEDICINE

## 2025-01-18 PROCEDURE — 82947 ASSAY GLUCOSE BLOOD QUANT: CPT | Performed by: STUDENT IN AN ORGANIZED HEALTH CARE EDUCATION/TRAINING PROGRAM

## 2025-01-18 PROCEDURE — 99232 SBSQ HOSP IP/OBS MODERATE 35: CPT | Performed by: INTERNAL MEDICINE

## 2025-01-18 PROCEDURE — 250N000011 HC RX IP 250 OP 636: Performed by: STUDENT IN AN ORGANIZED HEALTH CARE EDUCATION/TRAINING PROGRAM

## 2025-01-18 PROCEDURE — 85004 AUTOMATED DIFF WBC COUNT: CPT | Performed by: STUDENT IN AN ORGANIZED HEALTH CARE EDUCATION/TRAINING PROGRAM

## 2025-01-18 PROCEDURE — 120N000001 HC R&B MED SURG/OB

## 2025-01-18 PROCEDURE — 250N000013 HC RX MED GY IP 250 OP 250 PS 637: Performed by: INTERNAL MEDICINE

## 2025-01-18 PROCEDURE — 93005 ELECTROCARDIOGRAM TRACING: CPT

## 2025-01-18 PROCEDURE — 250N000013 HC RX MED GY IP 250 OP 250 PS 637: Performed by: STUDENT IN AN ORGANIZED HEALTH CARE EDUCATION/TRAINING PROGRAM

## 2025-01-18 PROCEDURE — 82374 ASSAY BLOOD CARBON DIOXIDE: CPT | Performed by: STUDENT IN AN ORGANIZED HEALTH CARE EDUCATION/TRAINING PROGRAM

## 2025-01-18 PROCEDURE — 84132 ASSAY OF SERUM POTASSIUM: CPT | Performed by: STUDENT IN AN ORGANIZED HEALTH CARE EDUCATION/TRAINING PROGRAM

## 2025-01-18 PROCEDURE — 93010 ELECTROCARDIOGRAM REPORT: CPT | Mod: HIP | Performed by: INTERNAL MEDICINE

## 2025-01-18 PROCEDURE — 999N000226 HC STATISTIC SLP IP EVAL DEFER

## 2025-01-18 PROCEDURE — 36415 COLL VENOUS BLD VENIPUNCTURE: CPT | Performed by: STUDENT IN AN ORGANIZED HEALTH CARE EDUCATION/TRAINING PROGRAM

## 2025-01-18 RX ORDER — QUETIAPINE FUMARATE 25 MG/1
25 TABLET, FILM COATED ORAL ONCE
Status: COMPLETED | OUTPATIENT
Start: 2025-01-18 | End: 2025-01-18

## 2025-01-18 RX ORDER — HALOPERIDOL 5 MG/ML
2 INJECTION INTRAMUSCULAR EVERY 6 HOURS PRN
Status: DISCONTINUED | OUTPATIENT
Start: 2025-01-18 | End: 2025-01-21 | Stop reason: HOSPADM

## 2025-01-18 RX ADMIN — ACETAMINOPHEN 975 MG: 325 TABLET ORAL at 14:21

## 2025-01-18 RX ADMIN — HYDRALAZINE HYDROCHLORIDE 10 MG: 20 INJECTION INTRAMUSCULAR; INTRAVENOUS at 15:52

## 2025-01-18 RX ADMIN — ROSUVASTATIN 10 MG: 10 TABLET, FILM COATED ORAL at 20:19

## 2025-01-18 RX ADMIN — ENOXAPARIN SODIUM 40 MG: 40 INJECTION SUBCUTANEOUS at 20:19

## 2025-01-18 RX ADMIN — PIPERACILLIN AND TAZOBACTAM 3.38 G: 3; .375 INJECTION, POWDER, FOR SOLUTION INTRAVENOUS at 20:20

## 2025-01-18 RX ADMIN — ESCITALOPRAM OXALATE 20 MG: 20 TABLET ORAL at 08:26

## 2025-01-18 RX ADMIN — QUETIAPINE FUMARATE 25 MG: 25 TABLET ORAL at 10:50

## 2025-01-18 RX ADMIN — PIPERACILLIN AND TAZOBACTAM 3.38 G: 3; .375 INJECTION, POWDER, FOR SOLUTION INTRAVENOUS at 11:05

## 2025-01-18 RX ADMIN — ACETAMINOPHEN 975 MG: 325 TABLET ORAL at 08:26

## 2025-01-18 RX ADMIN — Medication 25 MCG: at 08:26

## 2025-01-18 RX ADMIN — ACETAMINOPHEN 975 MG: 325 TABLET ORAL at 20:19

## 2025-01-18 RX ADMIN — PIPERACILLIN AND TAZOBACTAM 3.38 G: 3; .375 INJECTION, POWDER, FOR SOLUTION INTRAVENOUS at 03:19

## 2025-01-18 RX ADMIN — LISINOPRIL 20 MG: 20 TABLET ORAL at 08:26

## 2025-01-18 RX ADMIN — BUPROPION HYDROCHLORIDE 150 MG: 150 TABLET, FILM COATED, EXTENDED RELEASE ORAL at 08:26

## 2025-01-18 ASSESSMENT — ACTIVITIES OF DAILY LIVING (ADL)
ADLS_ACUITY_SCORE: 69
ADLS_ACUITY_SCORE: 76
ADLS_ACUITY_SCORE: 76
ADLS_ACUITY_SCORE: 69
ADLS_ACUITY_SCORE: 59
ADLS_ACUITY_SCORE: 55
ADLS_ACUITY_SCORE: 59
ADLS_ACUITY_SCORE: 69
ADLS_ACUITY_SCORE: 59
ADLS_ACUITY_SCORE: 55
ADLS_ACUITY_SCORE: 76
ADLS_ACUITY_SCORE: 69
ADLS_ACUITY_SCORE: 69
ADLS_ACUITY_SCORE: 76
ADLS_ACUITY_SCORE: 71
ADLS_ACUITY_SCORE: 59
ADLS_ACUITY_SCORE: 59
ADLS_ACUITY_SCORE: 63
ADLS_ACUITY_SCORE: 76
ADLS_ACUITY_SCORE: 59
ADLS_ACUITY_SCORE: 76
ADLS_ACUITY_SCORE: 59
ADLS_ACUITY_SCORE: 59

## 2025-01-18 NOTE — PROGRESS NOTES
[facility]    Medicine Progress Note - Hospitalist Service    Date of Admission:  1/17/2025    Assessment & Plan     Date of Admission:  1/17/2025    Iraj De Leon is a 74 year old male with DM2, HLD, HTN, Depression, Multiple Sclerosis, Alzheimer's dementia   Who presented to the ER after falling at home.   He lives with his partner, he fell on the night of 1/15/25 and hit his left shoulder. His wife gave him tylenol and put him to bed. He fell on the night of 1/16/25 after waking up in the middle of the night and landed on his left shoulder. He did not hit his head.    Assessment & Plan   Fall at home, no reported head injuries or LOC  No reported cardiopulmonary symptoms  History of underlying dementia, multiple sclerosis  CT head not done on admission, however has no focal deficits  PT OT following, plan discharge to TCU when bed available    Acute respiratory failure on admission  Down to 1.5 L/min oxygen via cannula  Chest imaging suggestive of left-sided pneumonia  Continues on antibiotics well-tolerated  Continue O2 per RT, ED viral studies negative  Consult speech therapy rule out aspiration    Left clavicular fracture, post fall  - PT / OT / CM  - keep arm in sling, nonoperative management, nonweightbearing left upper extremity  - orthopedics consulted, recommend PT OT, pain control, follow-up 2 to 3 weeks with [facility]  - tylenol for pain control    Depression  - continued on buproprion, escitalopram    Hypokalemia  - replacement protocol    HTN  - continued on lisinopril  - chlorthalidone held    DM2  - carb consistent diet  - Metformin held  - sliding scale insulin     HLD  - continued on rosuvastatin    Diet: Combination Diet Regular Diet Adult; Moderate Consistent Carb (60 g CHO per Meal) Diet  DVT Prophylaxis: Enoxaparin (Lovenox) SQ  Acosta Catheter: Not present  Lines: None     Cardiac Monitoring: None  Code Status: Full Code       Clinically Significant Risk  "Factors        # Hypokalemia: Lowest K = 3.2 mmol/L in last 2 days, will replace as needed   # Hypochloremia: Lowest Cl = 97 mmol/L in last 2 days, will monitor as appropriate          # Hypertension: Noted on problem list     # Dementia: noted on problem list       # DMII: A1C = 7.4 % (Ref range: 0.0 - 5.6 %) within past 6 months, PRESENT ON ADMISSION  # Obesity: Estimated body mass index is 31.63 kg/m  as calculated from the following:    Height as of this encounter: 1.727 m (5' 8\").    Weight as of this encounter: 94.3 kg (208 lb)., PRESENT ON ADMISSION     # Financial/Environmental Concerns: none         Social Drivers of Health    Tobacco Use: Medium Risk (1/17/2025)    Patient History     Smoking Tobacco Use: Former     Smokeless Tobacco Use: Never   Physical Activity: Inactive (1/13/2025)    Exercise Vital Sign     Days of Exercise per Week: 0 days     Minutes of Exercise per Session: 0 min   Stress: Stress Concern Present (1/13/2025)    [employer] - Occupational Stress Questionnaire     Feeling of Stress : To some extent   Social Connections: Moderately Isolated (1/13/2025)    Social Connection and Isolation Panel [NHANES]     Frequency of Communication with Friends and Family: Never     Frequency of Social Gatherings with Friends and Family: Never     Attends Oriental orthodox Services: Never     Active Member of Clubs or Organizations: Yes     Attends Club or Organization Meetings: Never     Marital Status:           Disposition Plan     Medically Ready for Discharge: Anticipated in 2-4 Days             Navdeep Melendrez MD  Hospitalist Service  [facility]  Securely message with [employer] (more info)  Text page via [employer] Paging/Directory   ______________________________________________________________________    Interval History   No events overnight per nursing  Patient arousable this morning, not in painful distress but slightly confused    Physical Exam "   Vital Signs: Temp: 98.6  F (37  C) Temp src: Oral BP: (!) 172/85 Pulse: 88   Resp: 18 SpO2: 97 % O2 Device: Nasal cannula Oxygen Delivery: 1.5 LPM  Weight: 208 lbs 0 oz  GEN: in no acute distress, pleasantly demented   HEENT: Moist mucus membranes, anicteric sclerae  NECK: symmetric without tracheal deviation  CV: reg rhythm, normal rate, audible s1 and s2, no murmurs / rubs / gallops  RESP: clear to auscultation, no rhonchi / rales / wheezes  ABD: non-distended  EXT: no peripheral edema, left arm in sling  SKIN: no suspicious skin findings, warm  NEURO: moves all four extremities, no focal deficits      Medical Decision Making       3 5 MINUTES SPENT BY ME on the date of service doing chart review, history, exam, documentation & further activities per the note.      Data   Imaging results reviewed over the past 24 hrs:   No results found for this or any previous visit (from the past 24 hours).

## 2025-01-18 NOTE — PLAN OF CARE
Problem: Adult Inpatient Plan of Care  Goal: Readiness for Transition of Care  Outcome: Progressing  Flowsheets (Taken 1/17/2025 1900)  Transportation Anticipated: agency  Intervention: Mutually Develop Transition Plan  Recent Flowsheet Documentation  Taken 1/17/2025 1900 by Sully Gerardo, RN  Transportation Anticipated: agency  Patient/Family Anticipated Services at Transition: rehabilitation services  Patient/Family Anticipates Transition to: inpatient rehabilitation facility  Taken 1/17/2025 1600 by Sully Gerardo, RN  Equipment Currently Used at Home: walker, rolling     Problem: Mobility Impairment  Goal: Optimal Mobility  1/17/2025 2245 by Sully Gerardo, RN  Outcome: Progressing  1/17/2025 2159 by Sully Gerardo, RN  Outcome: Progressing     Problem: Pain Acute  Goal: Optimal Pain Control and Function  1/17/2025 2245 by Sully Gerardo, RN  Outcome: Progressing  1/17/2025 2159 by Sully Gerardo, RN  Outcome: Progressing     Goal Outcome Evaluation:  Patient is alert to self. Patient needs frequent reminders to keep nasal cannula on. Patient incontinent of bladder, attempted to use primo-fit and patient pulled it off x 2. Patient attempted to sit at the side of bed and then stand with walker but was not able to hold himself up. Patient has sling to LUE and is NWB to LUE, needs reminders to keep arm in sling, denies pain.

## 2025-01-18 NOTE — PLAN OF CARE
Pt is alert, oriented to self only. Calm, cooperative, redirectable.  - frequently wants to get out of bed to see his wife (will not be here until morning)  - /89 at 2300, MD aware, no new orders, BP recheck 174/93  - other vitals stable  - on 1.5 L O2 nasal, pt will take it off and chew on it, but is redirectable   - denies pain this shift   - frequent voiding requiring whole bed changes  - 0130 potassium 3.7  - 0200 blood sugar: 153      Problem: Gas Exchange Impaired  Goal: Optimal Gas Exchange  Outcome: Progressing  Intervention: Optimize Oxygenation and Ventilation  Recent Flowsheet Documentation  Taken 1/18/2025 0000 by Naya Vargas, RN  Head of Bed (HOB) Positioning: HOB at 20 degrees     Intervention: Identify and Manage Fall Risk  Recent Flowsheet Documentation  Taken 1/18/2025 0000 by Naya Vargas RN  Safety Promotion/Fall Prevention:   activity supervised   assistive device/personal items within reach   patient and family education   room near nurse's station   safety round/check completed  Intervention: Prevent Skin Injury  Recent Flowsheet Documentation  Taken 1/18/2025 0000 by Naya Vargas RN  Body Position:   supine, head elevated   supine, legs elevated  Intervention: Prevent Infection  Recent Flowsheet Documentation  Taken 1/18/2025 0000 by Naya Vargas RN  Infection Prevention:   single patient room provided   rest/sleep promoted   hand hygiene promoted  Goal: Optimal Comfort and Wellbeing  Outcome: Progressing  Goal: Readiness for Transition of Care  Outcome: Progressing   Goal Outcome Evaluation:      Plan of Care Reviewed With: patient    Overall Patient Progress: no changeOverall Patient Progress: no change

## 2025-01-18 NOTE — PROGRESS NOTES
"CLINICAL NUTRITION SERVICES - ASSESSMENT NOTE    RECOMMENDATIONS FOR MDs/PROVIDERS TO ORDER:    Malnutrition Status:    Not noted    Registered Dietitian Interventions:  None at this time    Future/Additional Recommendations:  Monitor po intake, weight, labs     REASON FOR ASSESSMENT  Positive admission nutrition risk screen for unsure about weight loss. No decreased appetite noted    Pt with past medical history of DM 2, hyperlipidemia, hypertension, depression, MS, Alzheimer's dementia who presented to the ER after falling at home. In the ER he was found to be hypoxic.  He was admitted for work up and management of falls, pneumonitis, acute hypoxic respiratory failure and clavicle fracture    SUBJECTIVE INFORMATION  Assessed patient in room. Spoke with pt's wife, Jeniffer who states his appetite was more than good-They live in a senior home and Jeniffer makes hime breakfast and they eat in the dining room for lunch and supper, He does not follow any special diet.    NUTRITION HISTORY  Pt does not know what his usual weight is or if he followed any special diet at home    CURRENT NUTRITION ORDERS  Diet: Moderate Consistent Carbohydrate    CURRENT INTAKE/TOLERANCE  Ate 100%  lunch and supper on 1/17- currently eating his breakfast, most of it is gone    LABS  Nutrition-relevant labs: Glu 161 (H)    MEDICATIONS  Nutrition-relevant medications:  wellbutrin, lovenox, lexapro, novolog, zestril, zosyn, crestor, vit D 3    ANTHROPOMETRICS  Height: 172.7 cm (5' 8\")  Most Recent Weight: 94.3 kg (208 lb)  IBW: 70 kg ( 154 lb)  % IBW: 135%  BMI (kg/m ): Obesity Class I BMI 30-34.9  Weight History:   Wt Readings from Last 10 Encounters:   01/17/25 94.3 kg (208 lb)   01/16/25 95.6 kg (210 lb 11.2 oz)   11/25/24 99.4 kg (219 lb 3.2 oz)   10/28/24 98.3 kg (216 lb 12.8 oz)   10/14/24 97.1 kg (214 lb)   10/10/24 100.5 kg (221 lb 8 oz)   08/08/24 98.4 kg (217 lb)   05/16/24 97.5 kg (215 lb)   04/18/24 96.8 kg (213 lb 4.8 oz)   02/14/24 " 95.7 kg (211 lb)   11 lb weight loss in < 2 months ( 5%) not clinically significant    Dosing Weight: 76 kg, based on adjusted wt    ASSESSED NUTRITION NEEDS  Estimated Energy Needs: 1911-1144 kcals/day (25 - 30 kcals/kg)  Justification: Maintenance  Estimated Protein Needs: 76 grams protein/day ( 1 g/Kg )  Justification: Maintenance  Estimated Fluid Needs: 6593-8120 mL/day (1 mL/kcal)  Justification: Maintenance    SYSTEM FINDINGS    Skin/wounds: No open wounds noted  GI symptoms: WDL:  last Bm not documented    MALNUTRITION  % Intake: No decreased intake noted ( per pt)  % Weight Loss: Weight loss does not meet criteria   Subcutaneous Fat Loss: None observed  Muscle Loss: None observed  Fluid Accumulation/Edema: Moderate, 2+  Malnutrition Diagnosis: Patient does not meet two of the established criteria necessary for diagnosing malnutrition  Malnutrition Present on Admission: No    NUTRITION DIAGNOSIS  Altered nutrition related laboratory values related to DM 2 as evidenced by elevated BG    INTERVENTIONS  Continue current diet     Goals  Blood glucose <180  Consume 75% or more of nutritionally adequate meals 3 times per day or the equivalent with supplements/snacks     Monitoring/Evaluation  Progress toward goals will be monitored and evaluated per policy.

## 2025-01-18 NOTE — PLAN OF CARE
Problem: Fall Injury Risk  Goal: Absence of Fall and Fall-Related Injury  Outcome: Not Progressing  Intervention: Identify and Manage Contributors  Recent Flowsheet Documentation  Taken 1/18/2025 0825 by Geraldine Bloom RN  Medication Review/Management: medications reviewed  Intervention: Promote Injury-Free Environment  Recent Flowsheet Documentation  Taken 1/18/2025 0825 by Geraldine Bloom RN  Safety Promotion/Fall Prevention:   activity supervised   assistive device/personal items within reach   patient and family education   room near nurse's station   safety round/check completed     Problem: Mobility Impairment  Goal: Optimal Mobility  Outcome: Not Progressing  Intervention: Optimize Mobility  Recent Flowsheet Documentation  Taken 1/18/2025 0825 by Geraldine Bloom RN  Activity Management: activity adjusted per tolerance  Positioning/Transfer Devices:   pillows   in use     Problem: Gas Exchange Impaired  Goal: Optimal Gas Exchange  Outcome: Not Progressing  Intervention: Optimize Oxygenation and Ventilation  Recent Flowsheet Documentation  Taken 1/18/2025 0825 by Geraldine Bloom RN  Head of Bed (HOB) Positioning: HOB at 20-30 degrees     Problem: Pain Acute  Goal: Optimal Pain Control and Function  Outcome: Progressing  Intervention: Optimize Psychosocial Wellbeing  Recent Flowsheet Documentation  Taken 1/18/2025 1020 by Geraldine Bloom RN  Diversional Activities: music  Intervention: Prevent or Manage Pain  Recent Flowsheet Documentation  Taken 1/18/2025 0825 by Geraldine Bloom RN  Sleep/Rest Enhancement: music provided  Medication Review/Management: medications reviewed   Goal Outcome Evaluation:             Pt denies pain.  Pt initially this morning was calm and cooperative.  However, after breakfast was attempting to get OOB by self wanting to go home.  Pt was easily redirectable, he would attempt again as soon as you ended conversation.  Nurse stayed in room for charting.  Easily engaged in  conversation, but not always on track.  Did not understand that he was not home.  Even when speaking with wife on phone, he was unaware of who she was unless reminded.  Pt had 1 time dose of Seroquel, which helped calm pt.  Pt ate 100% of breakfast and lunch and able to feed self after tray set up.  Pt removed oxygen multiple times and also needed redirection to not pull on IV.  Pt has removed gown and bedding also.  Incont. Of B&B, and needed incont. Pads changed.

## 2025-01-18 NOTE — PROGRESS NOTES
Care Management Follow Up    Length of Stay (days): 1    Expected Discharge Date: 01/20/2025    Anticipated Discharge Plan:  Transitional Care    Transportation: Wheelchair Transport. Costs discussed with wife Jeniffer.    PT Recommendations: Transitional Care Facility  OT Recommendations:  Transitional Care Facility     Barriers to Discharge: placement    Prior Living Situation: independent living facility with spouse    Discussed  Partnership in Safe Discharge Planning  document with patient/family: No     Handoff Completed: No, handoff not indicated or clinically appropriate    Patient/Spokesperson Updated: Yes. Who? Spouse Jeniffer    Additional Information:  CM is working on TCU placement for Pt at discharge. Referrals are pending at this time. Family is reviewing their TCU list for more choices.     [facility] still considering. SW called and left a voicemail with admissions requesting a return phone call back at [phone]. [facility] declined due to lack of bed availability.    10:51 AM  SW called and spoke to Pt's wife Jeniffer about any other TCU preferences for CM to send referrals. Jeniffer reported that she plans to review the list a bit closer and will plan to call CM back with more choices sometime this weekend.    1:59 PM  SW received a phone call from Pt's wife Jeniffer who called to request that CM send a referral to [facility] and [facility] [address]. Jeniffer clarified her order of preference for TCU placement: [facility], [facility], and [facility] [address]. SW discussed plans for transport at discharge and potential costs for hospital transport at discharge. Jeniffer requested hospital transportation and was understanding of the potential costs for transport.     Next Steps: CM to follow up on pending TCU referrals for placement at discharge.       MIRI De La Cruz

## 2025-01-19 LAB
ANION GAP SERPL CALCULATED.3IONS-SCNC: 8 MMOL/L (ref 7–15)
BUN SERPL-MCNC: 15 MG/DL (ref 8–23)
CALCIUM SERPL-MCNC: 9.2 MG/DL (ref 8.8–10.4)
CHLORIDE SERPL-SCNC: 99 MMOL/L (ref 98–107)
CREAT SERPL-MCNC: 1 MG/DL (ref 0.67–1.17)
EGFRCR SERPLBLD CKD-EPI 2021: 79 ML/MIN/1.73M2
GLUCOSE BLDC GLUCOMTR-MCNC: 142 MG/DL (ref 70–99)
GLUCOSE BLDC GLUCOMTR-MCNC: 144 MG/DL (ref 70–99)
GLUCOSE BLDC GLUCOMTR-MCNC: 148 MG/DL (ref 70–99)
GLUCOSE BLDC GLUCOMTR-MCNC: 165 MG/DL (ref 70–99)
GLUCOSE BLDC GLUCOMTR-MCNC: 197 MG/DL (ref 70–99)
GLUCOSE BLDC GLUCOMTR-MCNC: 233 MG/DL (ref 70–99)
GLUCOSE SERPL-MCNC: 147 MG/DL (ref 70–99)
HCO3 SERPL-SCNC: 32 MMOL/L (ref 22–29)
POTASSIUM SERPL-SCNC: 3.6 MMOL/L (ref 3.4–5.3)
SODIUM SERPL-SCNC: 139 MMOL/L (ref 135–145)

## 2025-01-19 PROCEDURE — 80048 BASIC METABOLIC PNL TOTAL CA: CPT | Performed by: INTERNAL MEDICINE

## 2025-01-19 PROCEDURE — 250N000011 HC RX IP 250 OP 636: Performed by: INTERNAL MEDICINE

## 2025-01-19 PROCEDURE — 250N000012 HC RX MED GY IP 250 OP 636 PS 637: Performed by: STUDENT IN AN ORGANIZED HEALTH CARE EDUCATION/TRAINING PROGRAM

## 2025-01-19 PROCEDURE — 250N000011 HC RX IP 250 OP 636: Performed by: STUDENT IN AN ORGANIZED HEALTH CARE EDUCATION/TRAINING PROGRAM

## 2025-01-19 PROCEDURE — 99233 SBSQ HOSP IP/OBS HIGH 50: CPT | Performed by: INTERNAL MEDICINE

## 2025-01-19 PROCEDURE — 36415 COLL VENOUS BLD VENIPUNCTURE: CPT | Performed by: INTERNAL MEDICINE

## 2025-01-19 PROCEDURE — 250N000013 HC RX MED GY IP 250 OP 250 PS 637: Performed by: INTERNAL MEDICINE

## 2025-01-19 PROCEDURE — 82374 ASSAY BLOOD CARBON DIOXIDE: CPT | Performed by: INTERNAL MEDICINE

## 2025-01-19 PROCEDURE — 250N000013 HC RX MED GY IP 250 OP 250 PS 637: Performed by: STUDENT IN AN ORGANIZED HEALTH CARE EDUCATION/TRAINING PROGRAM

## 2025-01-19 PROCEDURE — 120N000001 HC R&B MED SURG/OB

## 2025-01-19 RX ORDER — PIPERACILLIN SODIUM, TAZOBACTAM SODIUM 3; .375 G/15ML; G/15ML
3.38 INJECTION, POWDER, LYOPHILIZED, FOR SOLUTION INTRAVENOUS EVERY 8 HOURS
Status: COMPLETED | OUTPATIENT
Start: 2025-01-19 | End: 2025-01-20

## 2025-01-19 RX ORDER — AMLODIPINE BESYLATE 2.5 MG/1
2.5 TABLET ORAL DAILY
Status: DISCONTINUED | OUTPATIENT
Start: 2025-01-19 | End: 2025-01-21

## 2025-01-19 RX ADMIN — ROSUVASTATIN 10 MG: 10 TABLET, FILM COATED ORAL at 21:04

## 2025-01-19 RX ADMIN — PIPERACILLIN AND TAZOBACTAM 3.38 G: 3; .375 INJECTION, POWDER, FOR SOLUTION INTRAVENOUS at 03:13

## 2025-01-19 RX ADMIN — ACETAMINOPHEN 975 MG: 325 TABLET ORAL at 09:20

## 2025-01-19 RX ADMIN — ESCITALOPRAM OXALATE 20 MG: 20 TABLET ORAL at 09:20

## 2025-01-19 RX ADMIN — AMLODIPINE BESYLATE 2.5 MG: 2.5 TABLET ORAL at 12:20

## 2025-01-19 RX ADMIN — LISINOPRIL 20 MG: 20 TABLET ORAL at 09:20

## 2025-01-19 RX ADMIN — ACETAMINOPHEN 975 MG: 325 TABLET ORAL at 21:04

## 2025-01-19 RX ADMIN — PIPERACILLIN AND TAZOBACTAM 3.38 G: 3; .375 INJECTION, POWDER, FOR SOLUTION INTRAVENOUS at 21:04

## 2025-01-19 RX ADMIN — ENOXAPARIN SODIUM 40 MG: 40 INJECTION SUBCUTANEOUS at 21:05

## 2025-01-19 RX ADMIN — Medication 25 MCG: at 09:20

## 2025-01-19 RX ADMIN — BUPROPION HYDROCHLORIDE 150 MG: 150 TABLET, FILM COATED, EXTENDED RELEASE ORAL at 09:20

## 2025-01-19 RX ADMIN — PIPERACILLIN AND TAZOBACTAM 3.38 G: 3; .375 INJECTION, POWDER, FOR SOLUTION INTRAVENOUS at 11:25

## 2025-01-19 RX ADMIN — INSULIN ASPART 1 UNITS: 100 INJECTION, SOLUTION INTRAVENOUS; SUBCUTANEOUS at 21:05

## 2025-01-19 RX ADMIN — ACETAMINOPHEN 975 MG: 325 TABLET ORAL at 13:35

## 2025-01-19 RX ADMIN — METFORMIN ER 500 MG 500 MG: 500 TABLET ORAL at 17:18

## 2025-01-19 ASSESSMENT — ACTIVITIES OF DAILY LIVING (ADL)
ADLS_ACUITY_SCORE: 76
ADLS_ACUITY_SCORE: 71
ADLS_ACUITY_SCORE: 72
ADLS_ACUITY_SCORE: 71
ADLS_ACUITY_SCORE: 67
ADLS_ACUITY_SCORE: 71
ADLS_ACUITY_SCORE: 71
ADLS_ACUITY_SCORE: 72
ADLS_ACUITY_SCORE: 71
ADLS_ACUITY_SCORE: 72
ADLS_ACUITY_SCORE: 76
ADLS_ACUITY_SCORE: 71
ADLS_ACUITY_SCORE: 67
ADLS_ACUITY_SCORE: 76
ADLS_ACUITY_SCORE: 71

## 2025-01-19 NOTE — PLAN OF CARE
Problem: Adult Inpatient Plan of Care  Goal: Plan of Care Review  Description: The Plan of Care Review/Shift note should be completed every shift.  The Outcome Evaluation is a brief statement about your assessment that the patient is improving, declining, or no change.  This information will be displayed automatically on your shift  note.  Outcome: Progressing  Goal: Absence of Hospital-Acquired Illness or Injury  Intervention: Prevent Infection  Recent Flowsheet Documentation  Taken 1/18/2025 1727 by Sully Gerardo RN  Infection Prevention:   single patient room provided   rest/sleep promoted   hand hygiene promoted     Problem: Fall Injury Risk  Goal: Absence of Fall and Fall-Related Injury  Outcome: Progressing     Problem: Pain Acute  Goal: Optimal Pain Control and Function  Outcome: Progressing     Problem: Gas Exchange Impaired  Goal: Optimal Gas Exchange  Outcome: Progressing     Goal Outcome Evaluation:  Patient is alert to self. Patient sleeping on and off this writers shift. Up for dinner, ate 100%. Patient incontinent of bowel and bladder. Patient denies pain in the LUE and remains NWB LUE. Patient attempting to get oob but redirectable.

## 2025-01-19 NOTE — PLAN OF CARE
Problem: Pain Acute  Goal: Optimal Pain Control and Function  Outcome: Progressing  Intervention: Prevent or Manage Pain  Recent Flowsheet Documentation  Taken 1/19/2025 0920 by Geraldine Bloom RN  Sleep/Rest Enhancement: music provided  Medication Review/Management: medications reviewed     Problem: Gas Exchange Impaired  Goal: Optimal Gas Exchange  Outcome: Progressing  Intervention: Optimize Oxygenation and Ventilation  Recent Flowsheet Documentation  Taken 1/19/2025 0920 by Geraldine Bloom RN  Head of Bed (HOB) Positioning: HOB at 20-30 degrees     Problem: Comorbidity Management  Goal: Maintenance of Behavioral Health Symptom Control  Outcome: Progressing  Intervention: Maintain Behavioral Health Symptom Control  Recent Flowsheet Documentation  Taken 1/19/2025 0920 by Geraldine Bloom RN  Medication Review/Management: medications reviewed  Goal: Blood Glucose Levels Within Targeted Range  Outcome: Progressing  Intervention: Monitor and Manage Glycemia  Recent Flowsheet Documentation  Taken 1/19/2025 0920 by Geraldine Bloom RN  Medication Review/Management: medications reviewed  Goal: Blood Pressure in Desired Range  Outcome: Progressing  Intervention: Maintain Blood Pressure Management  Recent Flowsheet Documentation  Taken 1/19/2025 0920 by Geraldine Bloom RN  Medication Review/Management: medications reviewed   Goal Outcome Evaluation:         Pt denies pain this morning.  BG this morning 144, received insulin per sliding scale orders.  Ate 100% of breakfast and able to feed self after tray set up.  O2 sat 95% on 1.5l/nc.  BP this morning was 166/87, received scheduled Lisinopril and on recheck BP was 136/77.  Pt very forgetful and is disorientated to time, place and situation, but has been very pleasant and cooperative.

## 2025-01-19 NOTE — PROGRESS NOTES
[facility]    Medicine Progress Note - Hospitalist Service    Date of Admission:  1/17/2025    Assessment & Plan   Iraj De Leon is a 74 year old male with DM2, HLD, HTN, Depression, Multiple Sclerosis, Alzheimer's dementia who presented to the ER after falling at home.   He  fell on the night of 1/15/25 and hit his left shoulder. His wife gave him tylenol and put him to bed. He fell on the night of 1/16/25 after waking up in the middle of the night and landed on his left shoulder. He did not hit his head.     Assessment & Plan      Left clavicular fracture, post fall                 PT / OT / CM         keep arm in sling, nonoperative management, nonweightbearing left upper extremity         orthopedics consulted, recommend PT OT, pain control, follow-up 2 to 3 weeks with [facility]         tylenol for pain control     2.  Acute hypoxic respiratory failure       Community-acquired pneumonia          COVID/RSV/influenza negative on admission         Full respiratory panel not completed           CXR  (1/17/25) with left sided infiltrates (mid and lower lung fields)         Has been on IV zosyn (day#3) d/t concern of possible aspiration component - will transition to po abx 1/20/25         Has been on supplemental oxygen - will try to wean off today       Out of bed to chair       IS will be difficult with this pt         SLP evaluated on 1/17/25 - no concerns for aspiration         3.  Hypokalemia, resolved       - replacement protocol prn     4. HTN  - continued on lisinopril  - chlorthalidone held d/t ongoing concerns over hypokalemia   Will start norvasc 2.5mg po with holding parameters    BP continue to be variable     5. DM2  - carb consistent diet  - Metformin - will restart  - sliding scale insulin prn     6. HLD  - continued on rosuvastatin      7.  Depression  - continued on buproprion, escitalopram      PPE Used:  Mask, gloves          Diet: DVT Prophylaxis: Enoxaparin  "(Lovenox) SQ  Acosta Catheter: Not present  Lines: None     Cardiac Monitoring: None  Code Status: Full Code      Clinically Significant Risk Factors        # Hypokalemia: Lowest K = 3.2 mmol/L in last 2 days, will replace as needed            # Hypertension: Noted on problem list     # Dementia: noted on problem list       # DMII: A1C = 7.4 % (Ref range: 0.0 - 5.6 %) within past 6 months, PRESENT ON ADMISSION  # Obesity: Estimated body mass index is 31.63 kg/m  as calculated from the following:    Height as of this encounter: 1.727 m (5' 8\").    Weight as of this encounter: 94.3 kg (208 lb)., PRESENT ON ADMISSION     # Financial/Environmental Concerns: none         Disposition Plan     Medically Ready for Discharge: Ready Now    Called pt's wife, Jeniffer.  All of her questions were answered including CXR results, oxygen needs, and disposition.  Discussed CODE STATUS and she confirms that he is a DNR/I.    She is concerned about TCU availability and awaiting SW update.             Courtney Otoole DO  Hospitalist Service  [facility]  Securely message with [employer] (more info)  Text page via [employer] Paging/Directory   ______________________________________________________________________    Interval History     Awake but minimally verbal this am.  Will not answer direct questions asked about symptoms this am.  Still on nasal cannula.  No new concerns overnight per nursing.     Physical Exam   Vital Signs: Temp: 98.3  F (36.8  C) Temp src: Oral BP: 123/78 Pulse: 98   Resp: 17 SpO2: 96 % O2 Device: Nasal cannula Oxygen Delivery: 1.5 LPM  Weight: 208 lbs 0 oz    GEN:  Alert, oriented x 3, appears comfortable, no overt respiratory distress.  HEENT:  Normocephalic/atraumatic, no scleral icterus, nasal cannula in place  CV:  somewhat distant but regular rate and rhythm, no murmur  S1 + S2 noted.  LUNGS:  Clear to auscultation ant/lat bilaterally without rales/rhonchi/wheezing/retractions.  " Symmetric chest rise on inhalation noted.  Unable to really follow directions when asked to take a deep breath.  ABD:  Active bowel sounds, soft, no grimacing to light palpation throughout  EXT:  mild swelling over the left clavicular joint - no significant bruising noted anterior.   Trace pretibial edema bilaterally.  No cyanosis.  No new joint synovitis noted.  SKIN:  Dry to touch, no new exanthems noted in the visualized areas.    Medical Decision Making       47 MINUTES SPENT BY ME on the date of service doing chart review, history, exam, documentation & further activities per the note.      Data   Medications   Current Facility-Administered Medications   Medication Dose Route Frequency Provider Last Rate Last Admin     Current Facility-Administered Medications   Medication Dose Route Frequency Provider Last Rate Last Admin    acetaminophen (TYLENOL) tablet 975 mg  975 mg Oral TID Prasanna Salgado DO   975 mg at 01/18/25 2019    buPROPion (WELLBUTRIN XL) 24 hr tablet 150 mg  150 mg Oral QAM Prasanna Salgado DO   150 mg at 01/18/25 0826    [Held by provider] chlorthalidone (HYGROTON) tablet 25 mg  25 mg Oral Daily Prasanna Salgado DO        enoxaparin ANTICOAGULANT (LOVENOX) injection 40 mg  40 mg Subcutaneous Q24H Caryl Choi MD   40 mg at 01/18/25 2019    escitalopram (LEXAPRO) tablet 20 mg  20 mg Oral Daily Prasanna Salgado DO   20 mg at 01/18/25 0826    insulin aspart (NovoLOG) injection (RAPID ACTING)  1-7 Units Subcutaneous TID AC Prasanna Salgado DO   2 Units at 01/18/25 1744    insulin aspart (NovoLOG) injection (RAPID ACTING)  1-5 Units Subcutaneous At Bedtime Prasanna Salgado DO        lisinopril (ZESTRIL) tablet 20 mg  20 mg Oral Daily Prasanna Salgado DO   20 mg at 01/18/25 0826    [Held by provider] metFORMIN (GLUCOPHAGE XR) 24 hr tablet 500 mg  500 mg Oral Daily with supper Prasanna Salgado DO        piperacillin-tazobactam (ZOSYN) 3.375 g vial to attach to  mL bag   "3.375 g Intravenous Q8H Prasanna Salgado DO   3.375 g at 01/19/25 0313    [Held by provider] potassium chloride jamal ER (KLOR-CON M20) CR tablet 20 mEq  20 mEq Oral BID Prasanna Salgado DO        rosuvastatin (CRESTOR) tablet 10 mg  10 mg Oral At Bedtime Prasanna Salgado DO   10 mg at 01/18/25 2019    sodium chloride (PF) 0.9% PF flush 3 mL  3 mL Intracatheter Q8H Prasanna Salgado,    3 mL at 01/19/25 0115    Vitamin D3 (CHOLECALCIFEROL) tablet 25 mcg  25 mcg Oral Daily Prasanna Salgado DO   25 mcg at 01/18/25 0826     Labs and Imaging results below reviewed today.  Recent Labs   Lab 01/18/25  0517 01/17/25 0211   WBC 7.0 9.3   HGB 16.2 16.3   HCT 48.7 48.7   MCV 88 88    227     Recent Labs   Lab 01/19/25  0854 01/19/25  0747 01/19/25  0529 01/18/25  0820 01/18/25  0517 01/18/25  0201 01/18/25  0129 01/17/25  0519 01/17/25  0211   NA  --   --  139  --  139  --   --   --  140   POTASSIUM  --   --  3.6  --  3.5  --  3.7   < > 3.2*   CHLORIDE  --   --  99  --  100  --   --   --  97*   CO2  --   --  32*  --  30*  --   --   --  33*   ANIONGAP  --   --  8  --  9  --   --   --  10   * 142* 147*   < > 161*   < >  --    < > 160*   BUN  --   --  15.0  --  9.9  --   --   --  16.6   CR  --   --  1.00  --  0.74  --   --   --  0.84   GFRESTIMATED  --   --  79  --  >90  --   --   --  >90   MIHIR  --   --  9.2  --  9.0  --   --   --  9.4    < > = values in this interval not displayed.     7-Day Micro Results       Collected Updated Procedure Result Status      01/17/2025 0212 01/17/2025 0305 Influenza A/B, RSV and SARS-CoV2 PCR (COVID-19) Nasopharyngeal [[id number]]    Swab from Nasopharyngeal    Final result Component Value   Influenza A PCR Negative   Influenza B PCR Negative   RSV PCR Negative   SARS CoV2 PCR Negative   NEGATIVE: SARS-CoV-2 (COVID-19) RNA not detected, presumed negative.                  No results for input(s): \"TSH\" in the last 168 hours.  No results for input(s): \"COLOR\", " "\"APPEARANCE\", \"URINEGLC\", \"URINEBILI\", \"URINEKETONE\", \"SG\", \"UBLD\", \"URINEPH\", \"PROTEIN\", \"UROBILINOGEN\", \"NITRITE\", \"LEUKEST\", \"RBCU\", \"WBCU\" in the last 168 hours.    No results found for this or any previous visit (from the past 24 hours).    "

## 2025-01-19 NOTE — PLAN OF CARE
Pt is oriented to self only, quiet, calm, cooperative  - vitals stable  - on 1.5 L O2, pt will put nasal prongs in mouth and chew on them  - incontinence care provided  - denies pain this shift  - 0200 blood sugar: 148  - 0600 blood sugar: 147   - potassium recheck this morning      Problem: Mobility Impairment  Goal: Optimal Mobility  Outcome: Not Progressing  Intervention: Optimize Mobility  Recent Flowsheet Documentation  Taken 1/19/2025 0200 by Naya Vargas, RN  Positioning/Transfer Devices:   pillows   in use     Problem: Gas Exchange Impaired  Goal: Optimal Gas Exchange  Outcome: Progressing  Intervention: Optimize Oxygenation and Ventilation  Recent Flowsheet Documentation  Taken 1/19/2025 0200 by Naya Vargas, RN  Head of Bed (HOB) Positioning: HOB at 20-30 degrees   Goal Outcome Evaluation:      Plan of Care Reviewed With: patient    Overall Patient Progress: no changeOverall Patient Progress: no change

## 2025-01-20 ENCOUNTER — APPOINTMENT (OUTPATIENT)
Dept: OCCUPATIONAL THERAPY | Facility: HOSPITAL | Age: 75
DRG: 562 | End: 2025-01-20
Payer: MEDICARE

## 2025-01-20 ENCOUNTER — APPOINTMENT (OUTPATIENT)
Dept: PHYSICAL THERAPY | Facility: HOSPITAL | Age: 75
DRG: 562 | End: 2025-01-20
Payer: MEDICARE

## 2025-01-20 LAB
ATRIAL RATE - MUSE: 85 BPM
DIASTOLIC BLOOD PRESSURE - MUSE: NORMAL MMHG
GLUCOSE BLDC GLUCOMTR-MCNC: 133 MG/DL (ref 70–99)
GLUCOSE BLDC GLUCOMTR-MCNC: 151 MG/DL (ref 70–99)
GLUCOSE BLDC GLUCOMTR-MCNC: 168 MG/DL (ref 70–99)
GLUCOSE BLDC GLUCOMTR-MCNC: 169 MG/DL (ref 70–99)
GLUCOSE BLDC GLUCOMTR-MCNC: 170 MG/DL (ref 70–99)
INTERPRETATION ECG - MUSE: NORMAL
P AXIS - MUSE: 40 DEGREES
PLATELET # BLD AUTO: 241 10E3/UL (ref 150–450)
POTASSIUM SERPL-SCNC: 3.7 MMOL/L (ref 3.4–5.3)
PR INTERVAL - MUSE: 154 MS
QRS DURATION - MUSE: 102 MS
QT - MUSE: 346 MS
QTC - MUSE: 411 MS
R AXIS - MUSE: 1 DEGREES
SYSTOLIC BLOOD PRESSURE - MUSE: NORMAL MMHG
T AXIS - MUSE: 220 DEGREES
VENTRICULAR RATE- MUSE: 85 BPM

## 2025-01-20 PROCEDURE — 120N000001 HC R&B MED SURG/OB

## 2025-01-20 PROCEDURE — 85049 AUTOMATED PLATELET COUNT: CPT | Performed by: INTERNAL MEDICINE

## 2025-01-20 PROCEDURE — 250N000011 HC RX IP 250 OP 636: Performed by: INTERNAL MEDICINE

## 2025-01-20 PROCEDURE — 84132 ASSAY OF SERUM POTASSIUM: CPT | Performed by: INTERNAL MEDICINE

## 2025-01-20 PROCEDURE — 36415 COLL VENOUS BLD VENIPUNCTURE: CPT | Performed by: INTERNAL MEDICINE

## 2025-01-20 PROCEDURE — 250N000013 HC RX MED GY IP 250 OP 250 PS 637: Performed by: STUDENT IN AN ORGANIZED HEALTH CARE EDUCATION/TRAINING PROGRAM

## 2025-01-20 PROCEDURE — 99232 SBSQ HOSP IP/OBS MODERATE 35: CPT | Performed by: INTERNAL MEDICINE

## 2025-01-20 PROCEDURE — 97535 SELF CARE MNGMENT TRAINING: CPT | Mod: GO

## 2025-01-20 PROCEDURE — 250N000013 HC RX MED GY IP 250 OP 250 PS 637: Performed by: INTERNAL MEDICINE

## 2025-01-20 PROCEDURE — 97530 THERAPEUTIC ACTIVITIES: CPT | Mod: GP | Performed by: PHYSICAL THERAPIST

## 2025-01-20 RX ADMIN — AMOXICILLIN AND CLAVULANATE POTASSIUM 1 TABLET: 875; 125 TABLET, FILM COATED ORAL at 08:51

## 2025-01-20 RX ADMIN — Medication 25 MCG: at 08:50

## 2025-01-20 RX ADMIN — ENOXAPARIN SODIUM 40 MG: 40 INJECTION SUBCUTANEOUS at 21:06

## 2025-01-20 RX ADMIN — AMOXICILLIN AND CLAVULANATE POTASSIUM 1 TABLET: 875; 125 TABLET, FILM COATED ORAL at 21:06

## 2025-01-20 RX ADMIN — BUPROPION HYDROCHLORIDE 150 MG: 150 TABLET, FILM COATED, EXTENDED RELEASE ORAL at 08:50

## 2025-01-20 RX ADMIN — ACETAMINOPHEN 975 MG: 325 TABLET ORAL at 08:50

## 2025-01-20 RX ADMIN — ROSUVASTATIN 10 MG: 10 TABLET, FILM COATED ORAL at 21:06

## 2025-01-20 RX ADMIN — AMLODIPINE BESYLATE 2.5 MG: 2.5 TABLET ORAL at 08:51

## 2025-01-20 RX ADMIN — ACETAMINOPHEN 975 MG: 325 TABLET ORAL at 21:06

## 2025-01-20 RX ADMIN — ESCITALOPRAM OXALATE 20 MG: 20 TABLET ORAL at 08:50

## 2025-01-20 RX ADMIN — LISINOPRIL 20 MG: 20 TABLET ORAL at 08:50

## 2025-01-20 RX ADMIN — METFORMIN ER 500 MG 500 MG: 500 TABLET ORAL at 18:05

## 2025-01-20 ASSESSMENT — ACTIVITIES OF DAILY LIVING (ADL)
ADLS_ACUITY_SCORE: 70
ADLS_ACUITY_SCORE: 71
ADLS_ACUITY_SCORE: 70
ADLS_ACUITY_SCORE: 71
ADLS_ACUITY_SCORE: 71
ADLS_ACUITY_SCORE: 70
ADLS_ACUITY_SCORE: 70
ADLS_ACUITY_SCORE: 71
ADLS_ACUITY_SCORE: 71
ADLS_ACUITY_SCORE: 70
ADLS_ACUITY_SCORE: 71
ADLS_ACUITY_SCORE: 70
ADLS_ACUITY_SCORE: 70
ADLS_ACUITY_SCORE: 71
ADLS_ACUITY_SCORE: 70
ADLS_ACUITY_SCORE: 71

## 2025-01-20 NOTE — PROGRESS NOTES
[facility]    PROGRESS NOTE - Hospitalist Service    ASSESSMENT AND PLAN     Active Problems:    Hypoxia    Closed nondisplaced fracture of acromial end of left clavicle, initial encounter    Iraj De Leon is a 74 year old male with DM2, HLD, HTN, Depression, Multiple Sclerosis, Alzheimer's dementia who presented to the ER after falling at home.   He  fell on the night of 1/15/25 and hit his left shoulder. His wife gave him tylenol and put him to bed. He fell on the night of 1/16/25 after waking up in the middle of the night and landed on his left shoulder.          Left clavicular fracture, post fall               PT / OT / CM         keep arm in sling, nonoperative management, nonweightbearing left upper extremity         orthopedics consulted, recommend PT OT, pain control, follow-up 2 to 3 weeks with [place]         tylenol for pain control     2.  Acute hypoxic respiratory failure       Community-acquired pneumonia       LL pneumonitis            COVID/RSV/influenza negative on admission         Full respiratory panel not completed            CXR  (1/17/25) with left sided infiltrates (mid and lower lung fields)         Received 3 days of Zosyn.  Augmentin initiated 1/20          Has been on supplemental oxygen - Attempting to wean again       IS will be difficult with this pt          SLP evaluated on 1/17/25 - no concerns for aspiration         3.  Hypokalemia, resolved       - replacement protocol prn     4. HTN  - continued on lisinopril  - chlorthalidone held d/t ongoing concerns over hypokalemia   Started on norvasc 2.5mg po with holding parameters     5. DM2  - Metformin- restarted   - Sliding scale insulin prn     6. HLD  - continued on rosuvastatin       7.  Depression  - continued on buproprion, escitalopram        Barriers to discharge: TCU placement-planned discharge tomorrow 1/21  Medically stable    Anticipated length of stay: 1 more night    Medically Ready  "for Discharge: Ready Now    Clinically Significant Risk Factors                   # Hypertension: Noted on problem list     # Dementia: noted on problem list       # DMII: A1C = 7.4 % (Ref range: 0.0 - 5.6 %) within past 6 months, PRESENT ON ADMISSION  # Obesity: Estimated body mass index is 31.63 kg/m  as calculated from the following:    Height as of this encounter: 1.727 m (5' 8\").    Weight as of this encounter: 94.3 kg (208 lb)., PRESENT ON ADMISSION     # Financial/Environmental Concerns: none         Subjective:  Patient resting comfortably in bed.  No complaints today.  Confused.    PHYSICAL EXAM  Temp:  [98  F (36.7  C)-98.3  F (36.8  C)] 98  F (36.7  C)  Pulse:  [84-99] 88  Resp:  [16-18] 18  BP: (137-183)/(74-93) 164/83  SpO2:  [90 %-93 %] 90 %  Wt Readings from Last 1 Encounters:   01/17/25 94.3 kg (208 lb)       Intake/Output Summary (Last 24 hours) at 1/20/2025 1333  Last data filed at 1/20/2025 1000  Gross per 24 hour   Intake 1320 ml   Output --   Net 1320 ml      Body mass index is 31.63 kg/m .    GENRL: Alert.  Answering some questions.  No respiratory distress.  Propped up in bed.   CHEST: Diminished throughout.  Breathing easily   HEART: Regular rate   EXTRM: No pedal edema  NEURO: Following some commands, No involuntary movements.   PSYCH: confused   INTGM: No skin rash    Medical Decision Making       35 MINUTES SPENT BY ME on the date of service doing chart review, history, exam, documentation & further activities per the note.      PERTINENT LABS/IMAGING:  Results for orders placed or performed during the hospital encounter of 01/17/25   XR Shoulder Left G/E 3 Views    Impression    IMPRESSION:   1. A cortical stepoff and apparent linear lucency within the most distal aspect of the left clavicle near the acromioclavicular joint, suspicious for a minimally displaced acute fracture. Recommend correlation with the site of patient's symptoms.  2. No other findings suspicious for acute fracture or " malalignment of the left shoulder.    Elbow  XR, G/E 3 views, left    Impression    IMPRESSION: Normal joint spaces and alignment. No fracture or joint effusion. A small calcific density adjacent to the medial humeral condyle is unchanged.   XR Chest 1 View    Impression    IMPRESSION: Heart size and pulmonary vascularity within normal limits. Subtle hazy increased opacity left mid and lower lung could be seen with a mild or low-grade pneumonitis. Right lung is clear. No significant bony abnormalities.     Most Recent 3 CBC's:  Recent Labs   Lab Test 01/20/25  0543 01/18/25  0517 01/17/25  0211 11/19/24  1422   WBC  --  7.0 9.3 5.2   HGB  --  16.2 16.3 16.2   MCV  --  88 88 89    216 227 225     Most Recent 3 BMP's:  Recent Labs   Lab Test 01/20/25  1200 01/20/25  0815 01/20/25  0543 01/20/25  0233 01/19/25  0747 01/19/25  0529 01/18/25  0820 01/18/25  0517 01/17/25  0519 01/17/25  0211   NA  --   --   --   --   --  139  --  139  --  140   POTASSIUM  --   --  3.7  --   --  3.6  --  3.5   < > 3.2*   CHLORIDE  --   --   --   --   --  99  --  100  --  97*   CO2  --   --   --   --   --  32*  --  30*  --  33*   BUN  --   --   --   --   --  15.0  --  9.9  --  16.6   CR  --   --   --   --   --  1.00  --  0.74  --  0.84   ANIONGAP  --   --   --   --   --  8  --  9  --  10   MIHIR  --   --   --   --   --  9.2  --  9.0  --  9.4   * 151*  --  133*   < > 147*   < > 161*   < > 160*    < > = values in this interval not displayed.     Most Recent 2 LFT's:  Recent Labs   Lab Test 01/18/25  0517 11/25/24  1042   AST 19 19   ALT 29 24   ALKPHOS 110 109   BILITOTAL 0.7 0.4       Recent Labs   Lab Test 01/16/25  1006   CHOL 139   HDL 50   LDL 64   TRIG 123     Recent Labs   Lab Test 01/16/25  1006 01/18/24  0925   LDL 64 62     Recent Labs   Lab Test 01/20/25  1200 01/20/25  0815 01/20/25  0543 01/19/25  0747 01/19/25  0529   NA  --   --   --   --  139   POTASSIUM  --   --  3.7  --  3.6   CHLORIDE  --   --   --   --  99  "  CO2  --   --   --   --  32*   *   < >  --    < > 147*   BUN  --   --   --   --  15.0   CR  --   --   --   --  1.00   GFRESTIMATED  --   --   --   --  79   MIHIR  --   --   --   --  9.2    < > = values in this interval not displayed.     Recent Labs   Lab Test 11/25/24  1042 05/16/24  0916 01/18/24  0925   A1C 7.4* 7.3* 7.1*     Recent Labs   Lab Test 01/18/25  0517 01/17/25  0211 11/19/24  1422   HGB 16.2 16.3 16.2     No results for input(s): \"TROPONINI\" in the last 86135 hours.  Recent Labs   Lab Test 01/17/25  0211   NTBNP 96     Recent Labs   Lab Test 02/20/24  1028   TSH 1.17     Recent Labs   Lab Test 03/16/20  0637 10/22/19  0534 10/21/19  0821   INR 0.97 1.11* 1.01       Ambar Lopez, DO  Hospitalist Service  [facility]      "

## 2025-01-20 NOTE — PLAN OF CARE
Pt is alert, oriented to self only, calm, cooperative   - vitals stable  - 0200 blood glucose: 133  - 1L O2 nasal  - incontinence care provided  - potassium recheck this am      Goal Outcome Evaluation:      Plan of Care Reviewed With: patient    Overall Patient Progress: no change    Outcome Evaluation: Medically ready for TCU      Problem: Gas Exchange Impaired  Goal: Optimal Gas Exchange  Outcome: Progressing  Intervention: Optimize Oxygenation and Ventilation  Recent Flowsheet Documentation  Taken 1/20/2025 0230 by Naya Vargas RN  Head of Bed (HOB) Positioning: HOB at 20 degrees  Taken 1/20/2025 0200 by Naya Vargas RN  Head of Bed ([facility]) Positioning: HOB at 20-30 degrees     Problem: Comorbidity Management  Goal: Blood Glucose Levels Within Targeted Range  Outcome: Progressing  Intervention: Monitor and Manage Glycemia  Recent Flowsheet Documentation  Taken 1/20/2025 0200 by Naya Vargas RN  Medication Review/Management: medications reviewed     Problem: Comorbidity Management  Goal: Blood Pressure in Desired Range  Outcome: Progressing  Intervention: Maintain Blood Pressure Management  Recent Flowsheet Documentation  Taken 1/20/2025 0200 by Naya Vargas RN  Medication Review/Management: medications reviewed

## 2025-01-20 NOTE — PROGRESS NOTES
Care Management Follow Up    Length of Stay (days): 3    Expected Discharge Date: 01/21/2025    Anticipated Discharge Plan:  Transitional Care    Transportation: Confirmed Wheelchair. Transportation costs discussed? Yes. Discussed with wife     PT Recommendations: Transitional Care Facility  OT Recommendations:  Transitional Care Facility     Barriers to Discharge: placement    Prior Living Situation: independent living facility with spouse    Discussed  Partnership in Safe Discharge Planning  document with patient/family: No     Handoff Completed: No, handoff not indicated or clinically appropriate    Patient/Spokesperson Updated: Yes. Who? Wife     Additional Information:  See below     Next Steps: continue to follow     Bernadine Styles RN        Patient accepted at [facility] [address] [facility] for tomorrow between 12-4pm. Discussed with Emilie via teams    Called wife. She is agreeable to placement at [facility]. Wants to check with [facility] since it's closer but would like to proceed with [facility] if [facility] is unable to accept. Would like wheelchair transport as discussed with previous SW     12:47 PM  Called wife again and she agrees to proceed with [facility] tomorrow . Ride time [id number]

## 2025-01-20 NOTE — PLAN OF CARE
"  Problem: Adult Inpatient Plan of Care  Goal: Plan of Care Review  Description: The Plan of Care Review/Shift note should be completed every shift.  The Outcome Evaluation is a brief statement about your assessment that the patient is improving, declining, or no change.  This information will be displayed automatically on your shift  note.  Outcome: Progressing  Goal: Patient-Specific Goal (Individualized)  Description: You can add care plan individualizations to a care plan. Examples of Individualization might be:  \"Parent requests to be called daily at 9am for status\", \"I have a hard time hearing out of my right ear\", or \"Do not touch me to wake me up as it startles  me\".  Outcome: Progressing  Goal: Absence of Hospital-Acquired Illness or Injury  Outcome: Progressing  Intervention: Prevent Skin Injury  Recent Flowsheet Documentation  Taken 1/19/2025 1809 by Sully Gerardo, RN  Body Position:   right   turned  Taken 1/19/2025 1609 by Sully Gerardo, RN  Body Position: sitting up in bed  Intervention: Prevent Infection  Recent Flowsheet Documentation  Taken 1/19/2025 1624 by Sully Gerardo, RN  Infection Prevention:   single patient room provided   rest/sleep promoted   hand hygiene promoted  Goal: Optimal Comfort and Wellbeing  Outcome: Progressing  Goal: Readiness for Transition of Care  Outcome: Progressing     Goal Outcome Evaluation:  Patient alert to self. Patient denies pain. Patient incontinent of bladder. Up with assist x 2 to bedside commode. Attempted to wean of O2, but patient would drop to the upper 80's. Placed onto 1 liter O2 back up to mid 90's.                       "

## 2025-01-20 NOTE — PLAN OF CARE
"  Problem: Adult Inpatient Plan of Care  Goal: Plan of Care Review  Description: The Plan of Care Review/Shift note should be completed every shift.  The Outcome Evaluation is a brief statement about your assessment that the patient is improving, declining, or no change.  This information will be displayed automatically on your shift  note.  Outcome: Progressing  Flowsheets (Taken 1/20/2025 1128)  Plan of Care Reviewed With: patient  Goal: Patient-Specific Goal (Individualized)  Description: You can add care plan individualizations to a care plan. Examples of Individualization might be:  \"Parent requests to be called daily at 9am for status\", \"I have a hard time hearing out of my right ear\", or \"Do not touch me to wake me up as it startles  me\".  Outcome: Progressing  Goal: Absence of Hospital-Acquired Illness or Injury  Outcome: Progressing  Intervention: Identify and Manage Fall Risk  Recent Flowsheet Documentation  Taken 1/20/2025 0809 by Prashanth Thompson RN  Safety Promotion/Fall Prevention:   activity supervised   clutter free environment maintained   lighting adjusted  Intervention: Prevent Skin Injury  Recent Flowsheet Documentation  Taken 1/20/2025 1009 by Prashanth Thompson RN  Body Position:   left   right   turned   heels elevated   supine  Taken 1/20/2025 0809 by Prashanth Thompson RN  Body Position:   left   right   turned   heels elevated   supine  Skin Protection: adhesive use limited  Intervention: Prevent Infection  Recent Flowsheet Documentation  Taken 1/20/2025 0809 by Prashanth Thompson RN  Infection Prevention:   hand hygiene promoted   rest/sleep promoted   single patient room provided  Goal: Optimal Comfort and Wellbeing  Outcome: Progressing  Goal: Readiness for Transition of Care  Outcome: Progressing     Problem: Fall Injury Risk  Goal: Absence of Fall and Fall-Related Injury  Outcome: Progressing  Intervention: Promote Injury-Free Environment  Recent Flowsheet Documentation  Taken 1/20/2025 0809 by " Jay, Prashanth A, RN  Safety Promotion/Fall Prevention:   activity supervised   clutter free environment maintained   lighting adjusted     Problem: Mobility Impairment  Goal: Optimal Mobility  Outcome: Progressing  Intervention: Optimize Mobility  Recent Flowsheet Documentation  Taken 1/20/2025 1009 by Prashanth Thompson RN  Positioning/Transfer Devices:   in use   pillows  Taken 1/20/2025 0809 by Prashanth Thompson RN  Activity Management: activity adjusted per tolerance  Positioning/Transfer Devices:   in use   pillows     Problem: Pain Acute  Goal: Optimal Pain Control and Function  Outcome: Progressing  Intervention: Optimize Psychosocial Wellbeing  Recent Flowsheet Documentation  Taken 1/20/2025 0809 by Prashanth Thompson RN  Diversional Activities: television     Problem: Gas Exchange Impaired  Goal: Optimal Gas Exchange  Outcome: Progressing  Intervention: Optimize Oxygenation and Ventilation  Recent Flowsheet Documentation  Taken 1/20/2025 1009 by Prashanth Thompson RN  Head of Bed (HOB) Positioning: HOB at 20-30 degrees  Taken 1/20/2025 0809 by Prashanth Thompson RN  Head of Bed (HOB) Positioning: HOB at 20-30 degrees     Problem: Comorbidity Management  Goal: Maintenance of Behavioral Health Symptom Control  Outcome: Progressing  Goal: Blood Glucose Levels Within Targeted Range  Outcome: Progressing  Goal: Blood Pressure in Desired Range  Outcome: Progressing     Problem: Confusion Chronic  Goal: Optimal Cognitive Function  Outcome: Progressing  Intervention: Minimize Injury Risk and Provide Safety  Recent Flowsheet Documentation  Taken 1/20/2025 0809 by Prashanth Thompson RN  Enhanced Safety Measures: assistive devices when indicated     Problem: Pneumonia  Goal: Fluid Balance  Outcome: Progressing  Goal: Resolution of Infection Signs and Symptoms  Outcome: Progressing  Goal: Effective Oxygenation and Ventilation  Outcome: Progressing  Intervention: Promote Airway Secretion Clearance  Recent Flowsheet Documentation  Taken  1/20/2025 0809 by Prashanth Thompson, RN  Activity Management: activity adjusted per tolerance  Intervention: Optimize Oxygenation and Ventilation  Recent Flowsheet Documentation  Taken 1/20/2025 1009 by Prashanth Thompson, RN  Head of Bed (HOB) Positioning: HOB at 20-30 degrees  Taken 1/20/2025 0809 by Prashanth Thompson, RN  Head of Bed (HOB) Positioning: HOB at 20-30 degrees     Problem: Electrolyte Imbalance  Goal: Electrolyte Balance  Outcome: Progressing   Goal Outcome Evaluation:      Plan of Care Reviewed With: patient

## 2025-01-21 ENCOUNTER — DOCUMENTATION ONLY (OUTPATIENT)
Dept: GERIATRICS | Facility: CLINIC | Age: 75
End: 2025-01-21
Payer: MEDICARE

## 2025-01-21 VITALS
WEIGHT: 208 LBS | RESPIRATION RATE: 20 BRPM | HEART RATE: 88 BPM | HEIGHT: 68 IN | OXYGEN SATURATION: 94 % | DIASTOLIC BLOOD PRESSURE: 90 MMHG | BODY MASS INDEX: 31.52 KG/M2 | SYSTOLIC BLOOD PRESSURE: 162 MMHG | TEMPERATURE: 97.8 F

## 2025-01-21 PROBLEM — M25.512 CHRONIC LEFT SHOULDER PAIN: Status: ACTIVE | Noted: 2019-01-08

## 2025-01-21 PROBLEM — H90.3 SENSORINEURAL HEARING LOSS (SNHL) OF BOTH EARS: Status: ACTIVE | Noted: 2021-08-24

## 2025-01-21 PROBLEM — R31.0 FRANK HEMATURIA: Status: ACTIVE | Noted: 2019-02-24

## 2025-01-21 PROBLEM — Z85.828 HISTORY OF BASAL CELL CARCINOMA: Status: ACTIVE | Noted: 2020-01-13

## 2025-01-21 PROBLEM — G89.29 CHRONIC LEFT SHOULDER PAIN: Status: ACTIVE | Noted: 2019-01-08

## 2025-01-21 PROBLEM — H93.13 TINNITUS, BILATERAL: Chronic | Status: ACTIVE | Noted: 2021-08-02

## 2025-01-21 PROBLEM — E83.50 CALCIUM DISORDER: Status: ACTIVE | Noted: 2019-06-11

## 2025-01-21 PROBLEM — F03.90 SENILE DEMENTIA (H): Status: ACTIVE | Noted: 2023-06-20

## 2025-01-21 PROBLEM — R31.29 MICROSCOPIC HEMATURIA: Status: ACTIVE | Noted: 2023-06-19

## 2025-01-21 PROBLEM — N20.0 KIDNEY STONE: Status: ACTIVE | Noted: 2024-02-27

## 2025-01-21 PROBLEM — Z96.649 HISTORY OF TOTAL HIP REPLACEMENT: Status: ACTIVE | Noted: 2019-10-21

## 2025-01-21 PROBLEM — F43.23 ADJUSTMENT DISORDER WITH MIXED ANXIETY AND DEPRESSED MOOD: Chronic | Status: ACTIVE | Noted: 2022-07-14

## 2025-01-21 PROBLEM — S92.421A: Chronic | Status: ACTIVE | Noted: 2022-07-20

## 2025-01-21 PROBLEM — A08.11 NOROVIRUS: Status: ACTIVE | Noted: 2023-06-21

## 2025-01-21 PROBLEM — G31.84 MILD NEUROCOGNITIVE DISORDER: Chronic | Status: ACTIVE | Noted: 2022-07-14

## 2025-01-21 LAB
GLUCOSE BLDC GLUCOMTR-MCNC: 147 MG/DL (ref 70–99)
GLUCOSE BLDC GLUCOMTR-MCNC: 148 MG/DL (ref 70–99)
HOLD SPECIMEN: NORMAL
POTASSIUM SERPL-SCNC: 4.1 MMOL/L (ref 3.4–5.3)

## 2025-01-21 PROCEDURE — 36415 COLL VENOUS BLD VENIPUNCTURE: CPT | Performed by: INTERNAL MEDICINE

## 2025-01-21 PROCEDURE — 250N000013 HC RX MED GY IP 250 OP 250 PS 637: Performed by: STUDENT IN AN ORGANIZED HEALTH CARE EDUCATION/TRAINING PROGRAM

## 2025-01-21 PROCEDURE — 250N000013 HC RX MED GY IP 250 OP 250 PS 637: Performed by: INTERNAL MEDICINE

## 2025-01-21 PROCEDURE — 84132 ASSAY OF SERUM POTASSIUM: CPT | Performed by: INTERNAL MEDICINE

## 2025-01-21 RX ORDER — AMLODIPINE BESYLATE 5 MG/1
5 TABLET ORAL DAILY
Status: DISCONTINUED | OUTPATIENT
Start: 2025-01-22 | End: 2025-01-21 | Stop reason: HOSPADM

## 2025-01-21 RX ORDER — AMLODIPINE BESYLATE 5 MG/1
5 TABLET ORAL DAILY
Qty: 30 TABLET | Refills: 0 | Status: SHIPPED | OUTPATIENT
Start: 2025-01-22

## 2025-01-21 RX ADMIN — Medication 25 MCG: at 08:33

## 2025-01-21 RX ADMIN — AMLODIPINE BESYLATE 2.5 MG: 2.5 TABLET ORAL at 08:33

## 2025-01-21 RX ADMIN — LISINOPRIL 20 MG: 20 TABLET ORAL at 08:33

## 2025-01-21 RX ADMIN — BUPROPION HYDROCHLORIDE 150 MG: 150 TABLET, FILM COATED, EXTENDED RELEASE ORAL at 08:33

## 2025-01-21 RX ADMIN — ACETAMINOPHEN 975 MG: 325 TABLET ORAL at 08:33

## 2025-01-21 RX ADMIN — ESCITALOPRAM OXALATE 20 MG: 20 TABLET ORAL at 08:33

## 2025-01-21 RX ADMIN — AMOXICILLIN AND CLAVULANATE POTASSIUM 1 TABLET: 875; 125 TABLET, FILM COATED ORAL at 08:33

## 2025-01-21 ASSESSMENT — ACTIVITIES OF DAILY LIVING (ADL)
ADLS_ACUITY_SCORE: 74
ADLS_ACUITY_SCORE: 74
ADLS_ACUITY_SCORE: 78
ADLS_ACUITY_SCORE: 74
ADLS_ACUITY_SCORE: 78
ADLS_ACUITY_SCORE: 74
ADLS_ACUITY_SCORE: 78
ADLS_ACUITY_SCORE: 74

## 2025-01-21 NOTE — PROGRESS NOTES
Care Management Discharge Note    Discharge Date: 01/21/2025       Discharge Disposition: Transitional Care    Discharge Services: None    Discharge DME: None    Discharge Transportation: agency    Private pay costs discussed: transportation costs    Does the patient's insurance plan have a 3 day qualifying hospital stay waiver?  No    PAS Confirmation Code: [id number]  Patient/family educated on Medicare website which has current facility and service quality ratings: yes    Education Provided on the Discharge Plan: Yes  Persons Notified of Discharge Plans: wife   Patient/Family in Agreement with the Plan:      Handoff Referral Completed: No, handoff not indicated or clinically appropriate    Additional Information:  See below     Bernadine Styles RN        Spoke to wife Jeniffer via telephone. She is wanting an update from provider today before discharge. Message sent to Dr Finnegan.     Confirmed with Emilie conrad [facility] on [address] that they can accept patient as planned     Orders sent via epic     PCS completed for transport     10:25 AM  Spoke to wife again. She talked to the doctor and is agreeable to discharge today. She says patient needs a shirt to wear and she cannot leave the house today due to the weather and her COPD. Shirt given to patient from the clothing closet

## 2025-01-21 NOTE — PLAN OF CARE
Physical Therapy Discharge Summary    Reason for therapy discharge:    Discharged to transitional care facility.    Progress towards therapy goal(s). See goals on Care Plan in [facility] electronic health record for goal details.  Goals not met.  Barriers to achieving goals:   limited tolerance for therapy and discharge from facility.    Therapy recommendation(s):    Continued therapy is recommended.  Rationale/Recommendations:  PT goals not met.

## 2025-01-21 NOTE — PLAN OF CARE
Problem: Adult Inpatient Plan of Care  Goal: Optimal Comfort and Wellbeing  Outcome: Progressing     Problem: Pain Acute  Goal: Optimal Pain Control and Function  Outcome: Progressing  Intervention: Optimize Psychosocial Wellbeing  Recent Flowsheet Documentation  Taken 1/21/2025 0115 by Johnnie Day RN  Diversional Activities: television  Intervention: Prevent or Manage Pain  Recent Flowsheet Documentation  Taken 1/21/2025 0115 by Johnnie Day RN  Medication Review/Management: medications reviewed     Problem: Gas Exchange Impaired  Goal: Optimal Gas Exchange  Outcome: Progressing  Intervention: Optimize Oxygenation and Ventilation  Recent Flowsheet Documentation  Taken 1/21/2025 0115 by Johnnie Day RN  Head of Bed (HOB) Positioning: HOB at 30 degrees     Problem: Comorbidity Management  Goal: Blood Pressure in Desired Range  Outcome: Progressing  Intervention: Maintain Blood Pressure Management  Recent Flowsheet Documentation  Taken 1/21/2025 0115 by Johnnie Day RN  Medication Review/Management: medications reviewed   Goal Outcome Evaluation:       A&O x 1 to self only. Not oob this shift, pt requires dani lift per last shift report. Admitted for hypoxia. Pt has left clavicle fracture, non surgical. PIV access right upper forearm SL. Potassium protocol. Potassium recheck scheduled 1/21/2025 morning. BG check. BG @0200 was 148. Anticipated discharge 1/21/2025 @[id number]. Bed in low position, call light within reach. Non-slip footwear in use. Patient calls appropriately.   Visit Vitals  BP (!) 166/90 (BP Location: Right arm)   Pulse 79   Temp 97.6  F (36.4  C) (Oral)   Resp 20

## 2025-01-21 NOTE — DISCHARGE SUMMARY
"[facility]  Hospitalist Discharge Summary      Date of Admission:  1/17/2025  Date of Discharge:  1/21/2025  Discharging Provider: Navdeep Melendrez MD  Discharge Service: Hospitalist Service    Discharge Diagnoses   Left clavicle fracture, post fall, follow-up orthopedics in 2 to 3 weeks, recommend conservative care with sling  Acute hypoxic respiratory failure, from pneumonitis/pneumonia, discharged on oxygen, continue antibiotics  SLP no aspiration on evaluation  Hypertension controlled on lisinopril, new med Norvasc started,  Diabetes continue on metformin  Hyperlipidemia continue rosuvastatin    Clinically Significant Risk Factors     # DMII: A1C = 7.4 % (Ref range: 0.0 - 5.6 %) within past 6 months  # Obesity: Estimated body mass index is 31.63 kg/m  as calculated from the following:    Height as of this encounter: 1.727 m (5' 8\").    Weight as of this encounter: 94.3 kg (208 lb).       Follow-ups Needed After Discharge   Follow-up Appointments       Follow Up Care      Please follow-up with a shoulder specialist in 2-3 weeks at [facility]. Call our scheduling line at [phone] to make an appointment, if you do not already have one scheduled.        Follow Up and recommended labs and tests      Follow up with skilled nursing physician.  The following labs/tests are recommended: cbc/bmp.  Follow up with primary care provider in post tcu d/c            PCP follow-up    Unresulted Labs Ordered in the Past 30 Days of this Admission       No orders found from 12/18/2024 to 1/18/2025.        These results will be followed up by above    Discharge Disposition   Discharged to short-term care facility  Condition at discharge: Stable    Hospital Course   Iraj De Leon is a 74 year old male with DM2, HLD, HTN, Depression, Multiple Sclerosis, Alzheimer's dementia who presented to the ER after falling at home.   He  fell on the night of 1/15/25 and hit his left shoulder. His wife gave him " tylenol and put him to bed. He fell on the night of 1/16/25 after waking up in the middle of the night and landed on his left shoulder.    He was seen by orthopedics recommended nonoperative intervention, sling was applied, pain meds were given with good response.  Found to have mild hypoxic respiratory failure with chest imaging showing CAP/pneumonitis left lower lobe, given antibiotics which were continued on discharge, also continues on supplemental oxygen.  Note COVID and influenza/RSV was negative.  Speech evaluation has had no concerns for aspiration.  Lisinopril was continued but diuretics was held given hypokalemia, this was replaced with Norvasc which was well-tolerated.  He has been discharged to TCU facility today.  I called the wife before discharge, all questions were satisfactorily answered         Left clavicular fracture, post fall               PT / OT / CM         keep arm in sling, nonoperative management, nonweightbearing left upper extremity         orthopedics consulted, recommend PT OT, pain control, follow-up 2 to 3 weeks with [facility]         tylenol for pain control     2.  Acute hypoxic respiratory failure       Community-acquired pneumonia       LL pneumonitis            COVID/RSV/influenza negative on admission         Full respiratory panel not completed            CXR  (1/17/25) with left sided infiltrates (mid and lower lung fields)         Received 3 days of Zosyn.  Augmentin initiated 1/20          Has been on supplemental oxygen - Attempting to wean again       IS will be difficult with this pt          SLP evaluated on 1/17/25 - no concerns for aspiration         3.  Hypokalemia, resolved       - replacement protocol prn     4. HTN  - continued on lisinopril  - chlorthalidone held d/t ongoing concerns over hypokalemia   Started on norvasc 2.5mg po with holding parameters     5. DM2  - Metformin- restarted   - Sliding scale insulin prn     6. HLD  - continued on  rosuvastatin       7.  Depression  - continued on buproprion, escitalopram        Barriers to discharge: TCU placement-planned discharge tomorrow 1/21  Medically stable     Anticipated length of stay: 1 more night     Medically Ready for Discharge: Ready Now    Consultations This Hospital Stay   ORTHOPEDIC SURGERY IP CONSULT  PHYSICAL THERAPY ADULT IP CONSULT  OCCUPATIONAL THERAPY ADULT IP CONSULT  SPEECH LANGUAGE PATH ADULT IP CONSULT  CARE MANAGEMENT / SOCIAL WORK IP CONSULT  CARE MANAGEMENT / SOCIAL WORK IP CONSULT  SPEECH LANGUAGE PATH ADULT IP CONSULT  PHYSICAL THERAPY ADULT IP CONSULT  OCCUPATIONAL THERAPY ADULT IP CONSULT    Code Status   No CPR- Do NOT Intubate    Time Spent on this Encounter   I, Navdeep Melendrez MD, personally saw the patient today and spent greater than 30 minutes discharging this patient.       Navdeep Melendrez MD  [address] [address]  Phone: [phone]  Fax: [phone]  ______________________________________________________________________    Physical Exam   Vital Signs: Temp: 97.8  F (36.6  C) Temp src: Oral BP: (!) 162/90 Pulse: 88   Resp: 20 SpO2: 94 % O2 Device: Nasal cannula Oxygen Delivery: 1 LPM  Weight: 208 lbs 0 oz    GENRL:  Answering some questions.  Not very verbal, confused  CHEST: Clear anteriorly  HEART: S1-S2 noted  EXTRM: No pedal edema  NEURO: Verbal but confused no involuntary movements.       Primary Care Physician   Dez Sinclair    Discharge Orders      Primary Care - Care Coordination Referral      Follow Up Care    Please follow-up with a shoulder specialist in 2-3 weeks at [facility]. Call our scheduling line at [phone] to make an appointment, if you do not already have one scheduled.     NO active or passive range of motion    NO active or passive range of motion of the shoulder     Elbow and Wrist range of motion    Active and passive elbow range of motion is permitted. Perform  Fist pumps every hour while you are awake.     NO weight bearing    No weight bearing on your operative extremity.     Sling WITHOUT pillow instructions    Wear sling at all times.  May remove sling only for exercises, hygiene, and skin checks twice daily.  Contact your surgeon team if you have any redness or irritation from your sling.     General info for SNF    Length of Stay Estimate: Short Term Care: Estimated # of Days <30  Condition at Discharge: Stable  Level of care:skilled   Rehabilitation Potential: Good  Admission H&P remains valid and up-to-date: Yes  Recent Chemotherapy: N/A  Use Nursing Home Standing Orders: Yes     Mantoux instructions    Give two-step Mantoux (PPD) Per Facility Policy Yes     Follow Up and recommended labs and tests    Follow up with [facility] physician.  The following labs/tests are recommended: cbc/bmp.  Follow up with primary care provider in post tcu d/c     Reason for your hospital stay    Mechanical fall, left clavicle fracture/PNA     Intake and output    Every shift     Daily weights    Call Provider for weight gain of more than 2 pounds per day or 5 pounds per week.     Activity - Up with nursing assistance     Physical Therapy Adult Consult    Evaluate and treat as clinically indicated.    Reason:  eval     Occupational Therapy Adult Consult    Evaluate and treat as clinically indicated.    Reason:  eval     Oxygen (SNF/TCU) Discharge     Fall precautions     Diet    Follow this diet upon discharge: Current Diet:Orders Placed This Encounter      Combination Diet Regular Diet Adult; Moderate Consistent Carb (60 g CHO per Meal) Diet       Significant Results and Procedures   Results for orders placed or performed during the hospital encounter of 01/17/25   XR Shoulder Left G/E 3 Views    Narrative    EXAM: LEFT SHOULDER 3 VIEWS  LOCATION: [facility]  DATE/TIME: 1/17/2025 2:54 AM CST    INDICATION: Fall. Shoulder pain.  COMPARISON: None.       Impression    IMPRESSION:   1. A cortical stepoff and apparent linear lucency within the most distal aspect of the left clavicle near the acromioclavicular joint, suspicious for a minimally displaced acute fracture. Recommend correlation with the site of patient's symptoms.  2. No other findings suspicious for acute fracture or malalignment of the left shoulder.    Elbow  XR, G/E 3 views, left    Narrative    EXAM: XR ELBOW LEFT G/E 3 VIEWS  LOCATION: [facility]  DATE: 1/17/2025    INDICATION: trauma, fall  COMPARISON: 07/04/2024      Impression    IMPRESSION: Normal joint spaces and alignment. No fracture or joint effusion. A small calcific density adjacent to the medial humeral condyle is unchanged.   XR Chest 1 View    Narrative    EXAM: XR CHEST 1 VIEW  LOCATION: [facility]  DATE: 1/17/2025    INDICATION: Hypoxia.  COMPARISON: 6/19/2023.      Impression    IMPRESSION: Heart size and pulmonary vascularity within normal limits. Subtle hazy increased opacity left mid and lower lung could be seen with a mild or low-grade pneumonitis. Right lung is clear. No significant bony abnormalities.       Discharge Medications   Current Discharge Medication List        START taking these medications    Details   amLODIPine (NORVASC) 5 MG tablet Take 1 tablet (5 mg) by mouth daily.  Qty: 30 tablet, Refills: 0    Associated Diagnoses: Closed nondisplaced fracture of acromial end of left clavicle, initial encounter      amoxicillin-clavulanate (AUGMENTIN) 875-125 MG tablet Take 1 tablet by mouth every 12 hours for 3 days.  Qty: 6 tablet, Refills: 0    Associated Diagnoses: Pneumonia due to infectious organism, unspecified laterality, unspecified part of lung           CONTINUE these medications which have NOT CHANGED    Details   buPROPion (WELLBUTRIN XL) 150 MG 24 hr tablet Take 1 tablet (150 mg) by mouth every morning.  Qty: 90 tablet, Refills: 1    Associated Diagnoses: Severe  episode of recurrent major depressive disorder, without psychotic features (H)      cholecalciferol, vitamin D3, 1,000 unit (25 mcg) tablet [CHOLECALCIFEROL, VITAMIN D3, 1,000 UNIT (25 MCG) TABLET] Take 1,000 Units by mouth daily.      escitalopram (LEXAPRO) 20 MG tablet Take 1 tablet (20 mg) by mouth daily.  Qty: 90 tablet, Refills: 1    Associated Diagnoses: Anxiety      lisinopril (ZESTRIL) 20 MG tablet TAKE 1 TABLET BY MOUTH EVERY DAY  Qty: 90 tablet, Refills: 2    Associated Diagnoses: Essential hypertension      metFORMIN (GLUCOPHAGE XR) 500 MG 24 hr tablet Take 1 tablet (500 mg) by mouth daily (with dinner).  Qty: 90 tablet, Refills: 1    Associated Diagnoses: Type 2 diabetes mellitus without complication, without long-term current use of insulin (H)      multivitamin therapeutic tablet [MULTIVITAMIN THERAPEUTIC TABLET] Take 1 tablet by mouth daily.      rosuvastatin (CRESTOR) 10 MG tablet TAKE 1 TABLET BY MOUTH EVERYDAY AT BEDTIME  Qty: 90 tablet, Refills: 3    Associated Diagnoses: Hyperlipidemia LDL goal <100           STOP taking these medications       chlorthalidone (HYGROTEN) 25 MG tablet Comments:   Reason for Stopping:         potassium chloride jamal ER (KLOR-CON M20) 20 MEQ CR tablet Comments:   Reason for Stopping:         potassium chloride jamal ER (KLOR-CON M20) 20 MEQ CR tablet Comments:   Reason for Stopping:             Allergies   No Known Allergies

## 2025-01-21 NOTE — PLAN OF CARE
Goal Outcome Evaluation:  Patient is alert and with intermittent confusion. Oriented to self and place. Patient able to report the year, and looked at their watch for the day of the week and date. Patient on 1L of oxygen via nasal canula.    Incontinent to bowel and bladder. Tolerates 60g carb diet. Blood sugars on 170 and 168.     Patient wearing sling on left arm, non-weight bearing per provider note. Patient requires dani lift with staff assist of 2. Turned and repositioned patient every two hours.     Patient will discharge on 1/21 between 12:30-1:15pm. Spouse would appreciate a phone call with the provider prior to discharge, sticky note added.       Problem: Pain Acute  Goal: Optimal Pain Control and Function  Outcome: Progressing  Intervention: Optimize Psychosocial Wellbeing  Recent Flowsheet Documentation  Taken 1/20/2025 1600 by Lu Cano RN  Diversional Activities: television  Intervention: Prevent or Manage Pain  Recent Flowsheet Documentation  Taken 1/20/2025 1600 by Lu Cano RN  Medication Review/Management: medications reviewed     Problem: Comorbidity Management  Goal: Blood Glucose Levels Within Targeted Range  Outcome: Progressing  Intervention: Monitor and Manage Glycemia  Recent Flowsheet Documentation  Taken 1/20/2025 1600 by Lu Cano RN  Medication Review/Management: medications reviewed  Goal: Blood Pressure in Desired Range  Outcome: Progressing  Intervention: Maintain Blood Pressure Management  Recent Flowsheet Documentation  Taken 1/20/2025 1600 by Lu Cano RN  Medication Review/Management: medications reviewed     Problem: Confusion Chronic  Goal: Optimal Cognitive Function  Outcome: Progressing  Intervention: Minimize Injury Risk and Provide Safety  Recent Flowsheet Documentation  Taken 1/20/2025 1600 by Lu Cano RN  Enhanced Safety Measures:   assistive devices when indicated   pain management     Problem: Pneumonia  Goal: Effective  Oxygenation and Ventilation  Outcome: Progressing  Intervention: Promote Airway Secretion Clearance  Recent Flowsheet Documentation  Taken 1/20/2025 1600 by Lu Cano RN  Activity Management: activity adjusted per tolerance  Intervention: Optimize Oxygenation and Ventilation  Recent Flowsheet Documentation  Taken 1/20/2025 2030 by Lu Cano, RN  Head of Bed (HOB) Positioning: HOB at 30-45 degrees  Taken 1/20/2025 1600 by Lu Cano RN  Head of Bed (HOB) Positioning: HOB at 30 degrees

## 2025-01-21 NOTE — PROGRESS NOTES
[facility] GERIATRICS  INITIAL VISIT NOTE    PRIMARY CARE PROVIDER AND CLINIC: Dez Sinclair [id number] [address] / [place] [address]    [facility] Medical Record Number: [id number]  Place of Service where encounter took place: [facility]) [[id number]]    Chief Complaint   Patient presents with    Hospital F/U     [facility] 1/17/2025 - 1/21/2025     HPI:    Iraj De Leon is a 74 year old (1950) male was admitted to the above facility from [facility]. Hospital stay 1/17/25 through 1/21/25 where they were admitted for clavicle fracture. Now admitted to this facility for rehab, medical management, and nursing care.      History obtained from: facility chart records, facility staff, patient report, and [facility] chart review.      Brief Hospital Course: PMH of Dm2, HLD, HTN, depression, MS, Alzheimer's dementia who presented after a fall. Found to have left clavicular fracture. Ortho consulted and recommended non operative management, NWB javier SOTO. Was noted to have hypoxia, started on supplemental oxygen. CXR showed left sided infiltrates, received 3 days of IV Zosyn, then transitioned to oral Augmentin. Chlorthalidone held due to hypokalemia, started on Norvasc. Did have some behaviors due to his dementia while IP, responded well to a one time dose of Seroquel. When medically stable was discharged to TCU for further rehab and medical management.       TCU Course: Staff report was agitated and restless yesterday evening. Repeatedly asking staff to go to elevator or go zto the basement. He did grab the arm of another patient and asked them to go th the basement with them. Also went into another patient's room in his wheelchair. Became very anxious when staff left his room and immediately hit call button for them to come back. He did attempt to self transfers, would also wheel self around unit in wheelchair looking for how to get home. He was given  PRN gabapentin, but it had no effect. He finally fell asleep around 2 AM this per staff report.     On visit today he was eating breakfast. Either answered questions with one word answers or did not answer them at all. He denies pain. Appetite is good. Denies any SOB. Is having bowel movements. Staff report he does take sling off at times. Staff report no behaviors this AM, enjoys working on his puzzle. Ambulated 10ft with therapy today.     CODE STATUS/ADVANCE DIRECTIVES: DNR / DNI    ALLERGIES:  No Known Allergies    PAST MEDICAL HISTORY:   Past Medical History:   Diagnosis Date    Arthritis     Cancer (H) 02/2020    basal cell Moh's forehead    Diabetes mellitus (H)     type 2, diet controlled as of March 2019    Headache 2014    Due to spontaneous spinal fluid leak    Hyperlipidemia     Hypertension     Kidney stones 2019    Multiple sclerosis (H)     Osteoarthritis     bilateral hips    Pituitary microadenoma (H)     Posterior vitreous detachment      PAST SURGICAL HISTORY:   Past Surgical History:   Procedure Laterality Date    BASAL CELL CARCINOMA EXCISION  02/20/2020    Moh's procedure to forehead    CHOLECYSTECTOMY      COLONOSCOPY      EYE SURGERY Right     cataract    IR LUMBAR PUNCTURE  3/28/2014    JOINT REPLACEMENT  10/2019    RTHA    LITHOTRIPSY  2019    OTHER SURGICAL HISTORY  2009    lipoma removalforehead    OTHER SURGICAL HISTORY      spinal fluid leak    OR CYSTO/URETERO W/LITHOTRIPSY &INDWELL STENT INSRT Right 3/27/2019    Procedure: CYSTOSCOPY RIGHT RETROGRADE PYELOGRAM, RIGHT URETEROSCOPY WITH LASER  LITHOTRIPSY STONE EXTRACTION AND RIGHT STENT EXCHANGE;  Surgeon: Zia Coyle MD;  Location: [facility];  Service: Urology    [facility] TOTAL HIP ARTHROPLASTY Right 10/21/2019    Procedure: RIGHT TOTAL HIP ARTHROPLASTY;  Surgeon: Conraod Gomez MD;  Location: [facility] OR;  Service: Orthopedics    [facility] TOTAL HIP ARTHROPLASTY Left 3/16/2020    Procedure: LEFT TOTAL HIP  "ARTHROPLASTY;  Surgeon: Conrado Gomez MD;  Location: [facility];  Service: Orthopedics     FAMILY HISTORY:   No family history on file.  Unable to review due to cognitive impairment    SOCIAL HISTORY:   Patient's living condition: lives with spouse    MEDICATIONS  Post Discharge Medication Reconciliation Status: discharge medications reconciled and changed, per note/orders.  Current Outpatient Medications   Medication Sig Dispense Refill    amLODIPine (NORVASC) 5 MG tablet Take 1 tablet (5 mg) by mouth daily. 30 tablet 0    amoxicillin-clavulanate (AUGMENTIN) 875-125 MG tablet Take 1 tablet by mouth every 12 hours for 3 days. 6 tablet 0    buPROPion (WELLBUTRIN XL) 150 MG 24 hr tablet Take 1 tablet (150 mg) by mouth every morning. 90 tablet 1    cholecalciferol, vitamin D3, 1,000 unit (25 mcg) tablet [CHOLECALCIFEROL, VITAMIN D3, 1,000 UNIT (25 MCG) TABLET] Take 1,000 Units by mouth daily.      escitalopram (LEXAPRO) 20 MG tablet Take 1 tablet (20 mg) by mouth daily. 90 tablet 1    gabapentin (NEURONTIN) 100 MG capsule Take 100 mg by mouth 4 times daily as needed for other (restlessness/agitation).      lisinopril (ZESTRIL) 20 MG tablet TAKE 1 TABLET BY MOUTH EVERY DAY 90 tablet 2    metFORMIN (GLUCOPHAGE XR) 500 MG 24 hr tablet Take 1 tablet (500 mg) by mouth daily (with dinner). 90 tablet 1    multivitamin therapeutic tablet [MULTIVITAMIN THERAPEUTIC TABLET] Take 1 tablet by mouth daily.      rosuvastatin (CRESTOR) 10 MG tablet TAKE 1 TABLET BY MOUTH EVERYDAY AT BEDTIME 90 tablet 3     ROS:  Unable to obtain due to cognitive impairment or aphasia      PHYSICAL EXAM:  BP (!) 155/81   Pulse 80   Temp 98.3  F (36.8  C)   Resp 18   Ht 1.727 m (5' 8\")   Wt 94.6 kg (208 lb 9.6 oz)   SpO2 95%   BMI 31.72 kg/m    Physical Exam  Cardiovascular:      Rate and Rhythm: Normal rate and regular rhythm.      Heart sounds: Normal heart sounds.   Pulmonary:      Effort: Pulmonary effort is normal.      Breath " sounds: Normal breath sounds.   Abdominal:      General: Bowel sounds are normal.      Palpations: Abdomen is soft.   Musculoskeletal:      Comments: LUE in sling   Skin:     Findings: Bruising (left shoulder) present.   Neurological:      Mental Status: He is alert.   Psychiatric:      Comments: Flat affect, impaired memory and judgement          LABORATORY/IMAGING DATA:  Reviewed as per [facility] and/or [facility]    ASSESSMENT/PLAN:  Closed nondisplaced fracture of acromial end of left clavicle with routine healing, subsequent encounter  Personal history of fall  Physical deconditioning  Ortho recommended non operative management. Pain appears controlled, due to cognition has been removing sling.  Discussed with patient, nursing staff, therapy  - analgesia with APAP PRN  - PT/OT  - NWB to LUE, sling  - follow-up with ortho     Pneumonia of left lower lobe due to infectious organism  Has been on RA since TCU admission. No coughing.  Discussed with patient, nursing staff.   - completed course of Augmentin  - discontinue oxygen orders.     Moderate late onset Alzheimer's dementia with other behavioral disturbance (H)  Has been having possible sundowning behaviors with anxiety, agitation/restlessness. Some concern could be danger to other residents as he was grabbing their arms to get them to go with them and going into their rooms.   - melatonin 3 MG tablet; Take 1 tablet (3 mg) by mouth at bedtime.  - QUEtiapine (SEROQUEL) 25 MG tablet; Take 1 tablet (25 mg) by mouth every 6 hours as needed (agitation, anxiety).  - OT to complete cognitive testing as able  -  to assist with discharge planning; per nursing report spouse had been looking into memory care unit     Essential hypertension  -150 since TCU admission  - continue lisinopril, amlodipine  - monitor and adjust     Mixed hyperlipidemia  - continue rosuvastatin     MDD (major depressive disorder), recurrent severe, without psychosis (H)  In  the setting of advanced dementia.   - continue Wellbutrin XL, monitor and adjust     Type 2 diabetes mellitus with right eye affected by mild nonproliferative retinopathy without macular edema, without long-term current use of insulin (H)  A1C 7.4%, metformin was held while IP. -344 since TCU admission  - continue metformin 500mg daily, monitor, may need to increase dose.     Multiple sclerosis (H)  No concern currently. May affect recovery.   - supportive cares.   - follows with [facility] neurology clinic.       Orders:   Discontinue gabapentin  Seroquel 25mg q6h PRN x 14 days  Melatonin 3mg at bedtime for insomnia  Weekly weight  Check BG BID  Discontinue oxygen orders    Total time spent with patient visit at the skilled nursing facility was 50 mnutes including patient visit and review of past records; includes time spent reviewing records from hospitalization within my organization including labs, imaging reports and provider/consult notes, review of most recent PCP note from within organization, review of TCU facility records, medication reconciliation, discussion of plan of care with patient, nursing staff and therapy as stated above as well as time spent on documentation.      Electronically signed by:  NICOLETTE Alvarado CNP

## 2025-01-21 NOTE — PLAN OF CARE
Occupational Therapy Discharge Summary    Reason for therapy discharge:    Discharged to TCU.    Progress towards therapy goal(s). See goals on Care Plan in [facility] electronic health record for goal details.  Goals not met.    Therapy recommendation(s):    Recommend continued OT @ TCU.

## 2025-01-21 NOTE — PLAN OF CARE
"  Problem: Adult Inpatient Plan of Care  Goal: Plan of Care Review  Description: The Plan of Care Review/Shift note should be completed every shift.  The Outcome Evaluation is a brief statement about your assessment that the patient is improving, declining, or no change.  This information will be displayed automatically on your shift  note.  Outcome: Progressing  Goal: Patient-Specific Goal (Individualized)  Description: You can add care plan individualizations to a care plan. Examples of Individualization might be:  \"Parent requests to be called daily at 9am for status\", \"I have a hard time hearing out of my right ear\", or \"Do not touch me to wake me up as it startles  me\".  Outcome: Progressing  Goal: Absence of Hospital-Acquired Illness or Injury  Outcome: Progressing  Goal: Optimal Comfort and Wellbeing  Outcome: Progressing  Goal: Readiness for Transition of Care  Outcome: Progressing     Problem: Fall Injury Risk  Goal: Absence of Fall and Fall-Related Injury  Outcome: Progressing     Problem: Mobility Impairment  Goal: Optimal Mobility  Outcome: Progressing     Problem: Pain Acute  Goal: Optimal Pain Control and Function  Outcome: Progressing     Problem: Gas Exchange Impaired  Goal: Optimal Gas Exchange  Outcome: Progressing     Problem: Comorbidity Management  Goal: Maintenance of Behavioral Health Symptom Control  Outcome: Progressing  Goal: Blood Glucose Levels Within Targeted Range  Outcome: Progressing  Goal: Blood Pressure in Desired Range  Outcome: Progressing     Problem: Confusion Chronic  Goal: Optimal Cognitive Function  Outcome: Progressing     Problem: Pneumonia  Goal: Fluid Balance  Outcome: Progressing  Goal: Resolution of Infection Signs and Symptoms  Outcome: Progressing  Goal: Effective Oxygenation and Ventilation  Outcome: Progressing     Problem: Electrolyte Imbalance  Goal: Electrolyte Balance  Outcome: Progressing   Goal Outcome Evaluation:    VS stable. He denies any pain.  Pt " discharge to Tcu and  he belongs send with him. He transported by stretcher.

## 2025-01-22 ENCOUNTER — LAB REQUISITION (OUTPATIENT)
Dept: LAB | Facility: CLINIC | Age: 75
End: 2025-01-22

## 2025-01-22 ENCOUNTER — TELEPHONE (OUTPATIENT)
Dept: GERIATRICS | Facility: CLINIC | Age: 75
End: 2025-01-22
Payer: MEDICARE

## 2025-01-22 ENCOUNTER — PATIENT OUTREACH (OUTPATIENT)
Dept: CARE COORDINATION | Facility: CLINIC | Age: 75
End: 2025-01-22
Payer: MEDICARE

## 2025-01-22 DIAGNOSIS — J18.9 PNEUMONIA, UNSPECIFIED ORGANISM: ICD-10-CM

## 2025-01-22 DIAGNOSIS — R76.12 NONSPECIFIC REACTION TO CELL MEDIATED IMMUNITY MEASUREMENT OF GAMMA INTERFERON ANTIGEN RESPONSE WITHOUT ACTIVE TUBERCULOSIS: ICD-10-CM

## 2025-01-22 DIAGNOSIS — E11.9 TYPE 2 DIABETES MELLITUS WITHOUT COMPLICATIONS (H): ICD-10-CM

## 2025-01-22 RX ORDER — GABAPENTIN 100 MG/1
100 CAPSULE ORAL 4 TIMES DAILY PRN
COMMUNITY
End: 2025-01-23 | Stop reason: ALTCHOICE

## 2025-01-22 NOTE — TELEPHONE ENCOUNTER
"[facility] Triage Call    Provider: NICOLETTE Jones CNP  Facility: [facility]  Facility Type:  TCU    Caller: Maria C  Call Back Number: [phone]    Allergies:  No Known Allergies     SBAR:     S-(situation): Nursing is reporting that pt is \"ramping up\" and is asking for a PRN medication.    B-(background): Pt is not on any PRN medications for behaviors. Lexapro 20 mg daily. 1 dose of Seroquel used in the hospital. TCU provider seeing pt tomorrow onsite. SMH includes: Alzheimer's and adjustment disorder.    A-(assessment): Nursing report that pt is continually wandering and is not redirectable. He appears very restless. Vitals are at baseline, pt otherwise stable.     R-(recommendations):        Telephone encounter sent to:  Gerhard Patiño MD    Please send response/orders to \"Geriatrics Nurse Pool\"    Harmony Henley      "

## 2025-01-22 NOTE — PROGRESS NOTES
Clinic Care Coordination Contact  Care Coordination Transition Communication    Clinical Data: Patient was hospitalized at [facility]  from 1/17/25 to 1/21/25 with diagnosis of   1. Hypoxia    2. Closed nondisplaced fracture of acromial end of left clavicle, initial encounter         Assessment: Patient has transitioned to TCU/ARU for short term rehabilitation:    Facility Name: [facility]   Transition Communication:  Notified facility of Primary Care- Care Coordination support via TCU hand-in e-mail.    Plan: Care Coordinator will await notification from facility staff informing of patient's discharge plans/needs. Care Coordinator will review chart and outreach to facility staff every 4 weeks and as needed.

## 2025-01-22 NOTE — Clinical Note
CHINMAY - pt currently in TCU. I will complete TCM outreach once discharged; adjusted your outreach to reflect.

## 2025-01-23 ENCOUNTER — TRANSITIONAL CARE UNIT VISIT (OUTPATIENT)
Dept: GERIATRICS | Facility: CLINIC | Age: 75
End: 2025-01-23
Payer: MEDICARE

## 2025-01-23 VITALS
OXYGEN SATURATION: 95 % | DIASTOLIC BLOOD PRESSURE: 81 MMHG | HEART RATE: 80 BPM | HEIGHT: 68 IN | WEIGHT: 208.6 LBS | BODY MASS INDEX: 31.61 KG/M2 | RESPIRATION RATE: 18 BRPM | SYSTOLIC BLOOD PRESSURE: 155 MMHG | TEMPERATURE: 98.3 F

## 2025-01-23 DIAGNOSIS — J18.9 PNEUMONIA OF LEFT LOWER LOBE DUE TO INFECTIOUS ORGANISM: ICD-10-CM

## 2025-01-23 DIAGNOSIS — E11.3291 TYPE 2 DIABETES MELLITUS WITH RIGHT EYE AFFECTED BY MILD NONPROLIFERATIVE RETINOPATHY WITHOUT MACULAR EDEMA, WITHOUT LONG-TERM CURRENT USE OF INSULIN (H): ICD-10-CM

## 2025-01-23 DIAGNOSIS — I10 ESSENTIAL HYPERTENSION: ICD-10-CM

## 2025-01-23 DIAGNOSIS — Z91.81 PERSONAL HISTORY OF FALL: ICD-10-CM

## 2025-01-23 DIAGNOSIS — G35 MULTIPLE SCLEROSIS (H): ICD-10-CM

## 2025-01-23 DIAGNOSIS — E78.2 MIXED HYPERLIPIDEMIA: ICD-10-CM

## 2025-01-23 DIAGNOSIS — R53.81 PHYSICAL DECONDITIONING: ICD-10-CM

## 2025-01-23 DIAGNOSIS — S42.035D CLOSED NONDISPLACED FRACTURE OF ACROMIAL END OF LEFT CLAVICLE WITH ROUTINE HEALING, SUBSEQUENT ENCOUNTER: Primary | ICD-10-CM

## 2025-01-23 DIAGNOSIS — F33.2 MDD (MAJOR DEPRESSIVE DISORDER), RECURRENT SEVERE, WITHOUT PSYCHOSIS (H): ICD-10-CM

## 2025-01-23 DIAGNOSIS — F02.B18 MODERATE LATE ONSET ALZHEIMER'S DEMENTIA WITH OTHER BEHAVIORAL DISTURBANCE (H): ICD-10-CM

## 2025-01-23 DIAGNOSIS — G30.1 MODERATE LATE ONSET ALZHEIMER'S DEMENTIA WITH OTHER BEHAVIORAL DISTURBANCE (H): ICD-10-CM

## 2025-01-23 LAB
ANION GAP SERPL CALCULATED.3IONS-SCNC: 13 MMOL/L (ref 7–15)
BASOPHILS # BLD AUTO: 0 10E3/UL (ref 0–0.2)
BASOPHILS NFR BLD AUTO: 1 %
BUN SERPL-MCNC: 21 MG/DL (ref 8–23)
CALCIUM SERPL-MCNC: 9 MG/DL (ref 8.8–10.4)
CHLORIDE SERPL-SCNC: 100 MMOL/L (ref 98–107)
CREAT SERPL-MCNC: 0.75 MG/DL (ref 0.67–1.17)
EGFRCR SERPLBLD CKD-EPI 2021: >90 ML/MIN/1.73M2
EOSINOPHIL # BLD AUTO: 0.2 10E3/UL (ref 0–0.7)
EOSINOPHIL NFR BLD AUTO: 3 %
ERYTHROCYTE [DISTWIDTH] IN BLOOD BY AUTOMATED COUNT: 12.8 % (ref 10–15)
GLUCOSE SERPL-MCNC: 162 MG/DL (ref 70–99)
HCO3 SERPL-SCNC: 26 MMOL/L (ref 22–29)
HCT VFR BLD AUTO: 52.1 % (ref 40–53)
HGB BLD-MCNC: 16.9 G/DL (ref 13.3–17.7)
IMM GRANULOCYTES # BLD: 0 10E3/UL
IMM GRANULOCYTES NFR BLD: 1 %
LYMPHOCYTES # BLD AUTO: 1.4 10E3/UL (ref 0.8–5.3)
LYMPHOCYTES NFR BLD AUTO: 21 %
MCH RBC QN AUTO: 29.3 PG (ref 26.5–33)
MCHC RBC AUTO-ENTMCNC: 32.4 G/DL (ref 31.5–36.5)
MCV RBC AUTO: 90 FL (ref 78–100)
MONOCYTES # BLD AUTO: 0.7 10E3/UL (ref 0–1.3)
MONOCYTES NFR BLD AUTO: 10 %
NEUTROPHILS # BLD AUTO: 4.3 10E3/UL (ref 1.6–8.3)
NEUTROPHILS NFR BLD AUTO: 65 %
NRBC # BLD AUTO: 0 10E3/UL
NRBC BLD AUTO-RTO: 0 /100
PLATELET # BLD AUTO: 264 10E3/UL (ref 150–450)
POTASSIUM SERPL-SCNC: 4 MMOL/L (ref 3.4–5.3)
RBC # BLD AUTO: 5.77 10E6/UL (ref 4.4–5.9)
SODIUM SERPL-SCNC: 139 MMOL/L (ref 135–145)
WBC # BLD AUTO: 6.5 10E3/UL (ref 4–11)

## 2025-01-23 PROCEDURE — 85025 COMPLETE CBC W/AUTO DIFF WBC: CPT | Performed by: NURSE PRACTITIONER

## 2025-01-23 PROCEDURE — P9603 ONE-WAY ALLOW PRORATED MILES: HCPCS | Performed by: NURSE PRACTITIONER

## 2025-01-23 PROCEDURE — 82310 ASSAY OF CALCIUM: CPT | Performed by: NURSE PRACTITIONER

## 2025-01-23 PROCEDURE — 82374 ASSAY BLOOD CARBON DIOXIDE: CPT | Performed by: NURSE PRACTITIONER

## 2025-01-23 PROCEDURE — 80048 BASIC METABOLIC PNL TOTAL CA: CPT | Performed by: NURSE PRACTITIONER

## 2025-01-23 PROCEDURE — 36415 COLL VENOUS BLD VENIPUNCTURE: CPT | Performed by: NURSE PRACTITIONER

## 2025-01-23 RX ORDER — ACETAMINOPHEN 325 MG/1
650 TABLET ORAL
COMMUNITY
Start: 2025-01-23

## 2025-01-23 RX ORDER — QUETIAPINE FUMARATE 25 MG/1
25 TABLET, FILM COATED ORAL EVERY 6 HOURS PRN
Status: SHIPPED
Start: 2025-01-23

## 2025-01-23 NOTE — LETTER
1/23/2025      Iraj De Leon  [address]  [address]  [place] [address]        [facility] GERIATRICS  INITIAL VISIT NOTE    PRIMARY CARE PROVIDER AND CLINIC: [place] [facility] [id number]    [facility] Medical Record Number: [id number]  Place of Service where encounter took place: [facility] ([facility]) [[id number]]    Chief Complaint   Patient presents with     Hospital F/U     [facility] 1/17/2025 - 1/21/2025     HPI:    Iraj De Leon is a 74 year old (1950) male was admitted to the above facility from [facility]. Hospital stay 1/17/25 through 1/21/25 where they were admitted for clavicle fracture. Now admitted to this facility for rehab, medical management, and nursing care.      History obtained from: facility chart records, facility staff, patient report, and [facility] chart review.      Brief Hospital Course: PMH of Dm2, HLD, HTN, depression, MS, Alzheimer's dementia who presented after a fall. Found to have left clavicular fracture. Ortho consulted and recommended non operative management, NWB to [facility]ITTANY. Was noted to have hypoxia, started on supplemental oxygen. CXR showed left sided infiltrates, received 3 days of IV Zosyn, then transitioned to oral Augmentin. Chlorthalidone held due to hypokalemia, started on Norvasc. Did have some behaviors due to his dementia while IP, responded well to a one time dose of Seroquel. When medically stable was discharged to TCU for further rehab and medical management.       TCU Course: Staff report was agitated and restless yesterday evening. Repeatedly asking staff to go to elevator or go zto the basement. He did grab the arm of another patient and asked them to go th the basement with them. Also went into another patient's room in his wheelchair. Became very anxious when staff left his room and immediately hit call button for them to come back. He did attempt to self transfers,  would also wheel self around unit in wheelchair looking for how to get home. He was given PRN gabapentin, but it had no effect. He finally fell asleep around 2 AM this per staff report.     On visit today he was eating breakfast. Either answered questions with one word answers or did not answer them at all. He denies pain. Appetite is good. Denies any SOB. Is having bowel movements. Staff report he does take sling off at times. Staff report no behaviors this AM, enjoys working on his puzzle. Ambulated 10ft with therapy today.     CODE STATUS/ADVANCE DIRECTIVES: DNR / DNI    ALLERGIES:  No Known Allergies    PAST MEDICAL HISTORY:   Past Medical History:   Diagnosis Date     Arthritis      Cancer (H) 02/2020    basal cell Moh's forehead     Diabetes mellitus (H)     type 2, diet controlled as of March 2019     Headache 2014    Due to spontaneous spinal fluid leak     Hyperlipidemia      Hypertension      Kidney stones 2019     Multiple sclerosis (H)      Osteoarthritis     bilateral hips     Pituitary microadenoma (H)      Posterior vitreous detachment      PAST SURGICAL HISTORY:   Past Surgical History:   Procedure Laterality Date     BASAL CELL CARCINOMA EXCISION  02/20/2020    Moh's procedure to forehead     CHOLECYSTECTOMY       COLONOSCOPY       EYE SURGERY Right     cataract     IR LUMBAR PUNCTURE  3/28/2014     JOINT REPLACEMENT  10/2019    RTHA     LITHOTRIPSY  2019     OTHER SURGICAL HISTORY  2009    lipoma removalforehead     OTHER SURGICAL HISTORY      spinal fluid leak     MS CYSTO/URETERO W/LITHOTRIPSY &INDWELL STENT INSRT Right 3/27/2019    Procedure: CYSTOSCOPY RIGHT RETROGRADE PYELOGRAM, RIGHT URETEROSCOPY WITH LASER  LITHOTRIPSY STONE EXTRACTION AND RIGHT STENT EXCHANGE;  Surgeon: Zia Coyle MD;  Location: [facility];  Service: Urology     [facility] Right 10/21/2019    Procedure: RIGHT TOTAL HIP ARTHROPLASTY;  Surgeon: Conrado Gomez MD;  Location: [facility]  "OR;  Service: Orthopedics     [facility] TOTAL HIP ARTHROPLASTY Left 3/16/2020    Procedure: LEFT TOTAL HIP ARTHROPLASTY;  Surgeon: Conrado Gomez MD;  Location: [facility] Main OR;  Service: Orthopedics     FAMILY HISTORY:   No family history on file.  Unable to review due to cognitive impairment    SOCIAL HISTORY:   Patient's living condition: lives with spouse    MEDICATIONS  Post Discharge Medication Reconciliation Status: discharge medications reconciled and changed, per note/orders.  Current Outpatient Medications   Medication Sig Dispense Refill     amLODIPine (NORVASC) 5 MG tablet Take 1 tablet (5 mg) by mouth daily. 30 tablet 0     amoxicillin-clavulanate (AUGMENTIN) 875-125 MG tablet Take 1 tablet by mouth every 12 hours for 3 days. 6 tablet 0     buPROPion (WELLBUTRIN XL) 150 MG 24 hr tablet Take 1 tablet (150 mg) by mouth every morning. 90 tablet 1     cholecalciferol, vitamin D3, 1,000 unit (25 mcg) tablet [CHOLECALCIFEROL, VITAMIN D3, 1,000 UNIT (25 MCG) TABLET] Take 1,000 Units by mouth daily.       escitalopram (LEXAPRO) 20 MG tablet Take 1 tablet (20 mg) by mouth daily. 90 tablet 1     gabapentin (NEURONTIN) 100 MG capsule Take 100 mg by mouth 4 times daily as needed for other (restlessness/agitation).       lisinopril (ZESTRIL) 20 MG tablet TAKE 1 TABLET BY MOUTH EVERY DAY 90 tablet 2     metFORMIN (GLUCOPHAGE XR) 500 MG 24 hr tablet Take 1 tablet (500 mg) by mouth daily (with dinner). 90 tablet 1     multivitamin therapeutic tablet [MULTIVITAMIN THERAPEUTIC TABLET] Take 1 tablet by mouth daily.       rosuvastatin (CRESTOR) 10 MG tablet TAKE 1 TABLET BY MOUTH EVERYDAY AT BEDTIME 90 tablet 3     ROS:  Unable to obtain due to cognitive impairment or aphasia      PHYSICAL EXAM:  BP (!) 155/81   Pulse 80   Temp 98.3  F (36.8  C)   Resp 18   Ht 1.727 m (5' 8\")   Wt 94.6 kg (208 lb 9.6 oz)   SpO2 95%   BMI 31.72 kg/m    Physical Exam  Cardiovascular:      Rate and Rhythm: Normal rate and regular " rhythm.      Heart sounds: Normal heart sounds.   Pulmonary:      Effort: Pulmonary effort is normal.      Breath sounds: Normal breath sounds.   Abdominal:      General: Bowel sounds are normal.      Palpations: Abdomen is soft.   Musculoskeletal:      Comments: LUE in sling   Skin:     Findings: Bruising (left shoulder) present.   Neurological:      Mental Status: He is alert.   Psychiatric:      Comments: Flat affect, impaired memory and judgement          LABORATORY/IMAGING DATA:  Reviewed as per [facility] and/or [facility]    ASSESSMENT/PLAN:  Closed nondisplaced fracture of acromial end of left clavicle with routine healing, subsequent encounter  Personal history of fall  Physical deconditioning  Ortho recommended non operative management. Pain appears controlled, due to cognition has been removing sling.  Discussed with patient, nursing staff, therapy  - analgesia with APAP PRN  - PT/OT  - NWB to LUE, sling  - follow-up with ortho     Pneumonia of left lower lobe due to infectious organism  Has been on RA since TCU admission. No coughing.  Discussed with patient, nursing staff.   - completed course of Augmentin  - discontinue oxygen orders.     Moderate late onset Alzheimer's dementia with other behavioral disturbance (H)  Has been having possible sundowning behaviors with anxiety, agitation/restlessness. Some concern could be danger to other residents as he was grabbing their arms to get them to go with them and going into their rooms.   - melatonin 3 MG tablet; Take 1 tablet (3 mg) by mouth at bedtime.  - QUEtiapine (SEROQUEL) 25 MG tablet; Take 1 tablet (25 mg) by mouth every 6 hours as needed (agitation, anxiety).  - OT to complete cognitive testing as able  -  to assist with discharge planning; per nursing report spouse had been looking into memory care unit     Essential hypertension  -150 since TCU admission  - continue lisinopril, amlodipine  - monitor and adjust     Mixed  hyperlipidemia  - continue rosuvastatin     MDD (major depressive disorder), recurrent severe, without psychosis (H)  In the setting of advanced dementia.   - continue Wellbutrin XL, monitor and adjust     Type 2 diabetes mellitus with right eye affected by mild nonproliferative retinopathy without macular edema, without long-term current use of insulin (H)  A1C 7.4%, metformin was held while IP. -344 since TCU admission  - continue metformin 500mg daily, monitor, may need to increase dose.     Multiple sclerosis (H)  No concern currently. May affect recovery.   - supportive cares.   - follows with [facility] neurology clinic.       Orders:   Discontinue gabapentin  Seroquel 25mg q6h PRN x 14 days  Melatonin 3mg at bedtime for insomnia  Weekly weight  Check BG BID  Discontinue oxygen orders    Total time spent with patient visit at the skilled nursing facility was 50 mnutes including patient visit and review of past records; includes time spent reviewing records from hospitalization within my organization including labs, imaging reports and provider/consult notes, review of most recent PCP note from within organization, review of TCU facility records, medication reconciliation, discussion of plan of care with patient, nursing staff and therapy as stated above as well as time spent on documentation.      Electronically signed by:  NICOLETTE Alvarado CNP       Sincerely,        NICOLETTE Alvarado CNP    Electronically signed

## 2025-01-26 ENCOUNTER — LAB REQUISITION (OUTPATIENT)
Dept: LAB | Facility: CLINIC | Age: 75
End: 2025-01-26

## 2025-01-26 ENCOUNTER — TELEPHONE (OUTPATIENT)
Dept: BEHAVIORAL HEALTH | Facility: CLINIC | Age: 75
End: 2025-01-26
Payer: MEDICARE

## 2025-01-26 DIAGNOSIS — J18.9 PNEUMONIA, UNSPECIFIED ORGANISM: ICD-10-CM

## 2025-01-26 NOTE — PROGRESS NOTES
Nurse is calling to report patient with acute change in vital signs. Had been admitted on 2 L of O2 and dropped down to 85%, increased to 91% on 4 L. Diminished lung sounds. Heart rate 120, blood pressure stable in the 130s. He did have a recent pneumonia and completed Augmentin a few days ago. Unsure if he is aspirating but they did not think so on evaluation per notes.  Orders: Check influenza A/B , COVID swab. Chest x-ray AP and lateral tomorrow, CBC on Monday, guaifenesin 400 mg p.o. every 4 hours as needed, albuterol neb twice daily as needed, increase Tylenol to thousand 3 times daily as needed for fever and discomfort. Monitor vital signs, if worsening lethargy, tachycardia, hypoxia and call back. Patient is DNR/DNI.  Liza Leos, CNP

## 2025-01-27 ENCOUNTER — TRANSITIONAL CARE UNIT VISIT (OUTPATIENT)
Dept: GERIATRICS | Facility: CLINIC | Age: 75
End: 2025-01-27
Payer: MEDICARE

## 2025-01-27 ENCOUNTER — TELEPHONE (OUTPATIENT)
Dept: FAMILY MEDICINE | Facility: CLINIC | Age: 75
End: 2025-01-27

## 2025-01-27 VITALS
HEART RATE: 107 BPM | RESPIRATION RATE: 20 BRPM | BODY MASS INDEX: 29.37 KG/M2 | SYSTOLIC BLOOD PRESSURE: 163 MMHG | WEIGHT: 193.8 LBS | HEIGHT: 68 IN | OXYGEN SATURATION: 94 % | TEMPERATURE: 98.6 F | DIASTOLIC BLOOD PRESSURE: 91 MMHG

## 2025-01-27 DIAGNOSIS — S42.035D CLOSED NONDISPLACED FRACTURE OF ACROMIAL END OF LEFT CLAVICLE WITH ROUTINE HEALING, SUBSEQUENT ENCOUNTER: ICD-10-CM

## 2025-01-27 DIAGNOSIS — R53.81 PHYSICAL DECONDITIONING: ICD-10-CM

## 2025-01-27 DIAGNOSIS — J96.01 ACUTE RESPIRATORY FAILURE WITH HYPOXIA (H): ICD-10-CM

## 2025-01-27 DIAGNOSIS — G30.1 MODERATE LATE ONSET ALZHEIMER'S DEMENTIA WITH OTHER BEHAVIORAL DISTURBANCE (H): ICD-10-CM

## 2025-01-27 DIAGNOSIS — Z91.81 PERSONAL HISTORY OF FALL: ICD-10-CM

## 2025-01-27 DIAGNOSIS — F02.B18 MODERATE LATE ONSET ALZHEIMER'S DEMENTIA WITH OTHER BEHAVIORAL DISTURBANCE (H): ICD-10-CM

## 2025-01-27 DIAGNOSIS — J10.1 INFLUENZA A: Primary | ICD-10-CM

## 2025-01-27 DIAGNOSIS — E11.3291 TYPE 2 DIABETES MELLITUS WITH RIGHT EYE AFFECTED BY MILD NONPROLIFERATIVE RETINOPATHY WITHOUT MACULAR EDEMA, WITHOUT LONG-TERM CURRENT USE OF INSULIN (H): ICD-10-CM

## 2025-01-27 LAB
ATRIAL RATE - MUSE: 87 BPM
BASOPHILS # BLD AUTO: 0 10E3/UL (ref 0–0.2)
BASOPHILS NFR BLD AUTO: 0 %
DIASTOLIC BLOOD PRESSURE - MUSE: NORMAL MMHG
EOSINOPHIL # BLD AUTO: 0 10E3/UL (ref 0–0.7)
EOSINOPHIL NFR BLD AUTO: 0 %
ERYTHROCYTE [DISTWIDTH] IN BLOOD BY AUTOMATED COUNT: 13.2 % (ref 10–15)
HCT VFR BLD AUTO: 47.8 % (ref 40–53)
HGB BLD-MCNC: 15.6 G/DL (ref 13.3–17.7)
IMM GRANULOCYTES # BLD: 0 10E3/UL
IMM GRANULOCYTES NFR BLD: 0 %
INTERPRETATION ECG - MUSE: NORMAL
LYMPHOCYTES # BLD AUTO: 0.7 10E3/UL (ref 0.8–5.3)
LYMPHOCYTES NFR BLD AUTO: 12 %
MCH RBC QN AUTO: 29.6 PG (ref 26.5–33)
MCHC RBC AUTO-ENTMCNC: 32.6 G/DL (ref 31.5–36.5)
MCV RBC AUTO: 91 FL (ref 78–100)
MONOCYTES # BLD AUTO: 0.8 10E3/UL (ref 0–1.3)
MONOCYTES NFR BLD AUTO: 13 %
NEUTROPHILS # BLD AUTO: 4.3 10E3/UL (ref 1.6–8.3)
NEUTROPHILS NFR BLD AUTO: 74 %
NRBC # BLD AUTO: 0 10E3/UL
NRBC BLD AUTO-RTO: 0 /100
P AXIS - MUSE: 39 DEGREES
PLATELET # BLD AUTO: 217 10E3/UL (ref 150–450)
PR INTERVAL - MUSE: 200 MS
QRS DURATION - MUSE: 108 MS
QT - MUSE: 424 MS
QTC - MUSE: 510 MS
R AXIS - MUSE: -23 DEGREES
RBC # BLD AUTO: 5.27 10E6/UL (ref 4.4–5.9)
SYSTOLIC BLOOD PRESSURE - MUSE: NORMAL MMHG
T AXIS - MUSE: 17 DEGREES
VENTRICULAR RATE- MUSE: 87 BPM
WBC # BLD AUTO: 5.8 10E3/UL (ref 4–11)

## 2025-01-27 PROCEDURE — 99309 SBSQ NF CARE MODERATE MDM 30: CPT | Performed by: NURSE PRACTITIONER

## 2025-01-27 PROCEDURE — 36415 COLL VENOUS BLD VENIPUNCTURE: CPT | Performed by: NURSE PRACTITIONER

## 2025-01-27 PROCEDURE — 85025 COMPLETE CBC W/AUTO DIFF WBC: CPT | Performed by: NURSE PRACTITIONER

## 2025-01-27 RX ORDER — ALBUTEROL SULFATE 0.83 MG/ML
2.5 SOLUTION RESPIRATORY (INHALATION) EVERY 4 HOURS PRN
Status: SHIPPED
Start: 2025-01-27

## 2025-01-27 RX ORDER — OSELTAMIVIR PHOSPHATE 75 MG/1
75 CAPSULE ORAL 2 TIMES DAILY
Status: SHIPPED
Start: 2025-01-27 | End: 2025-02-01

## 2025-01-27 RX ORDER — GUAIFENESIN 200 MG/10ML
200 LIQUID ORAL EVERY 4 HOURS PRN
COMMUNITY
Start: 2025-01-27

## 2025-01-27 NOTE — LETTER
1/27/2025      Iraj De Leon  [address]  [place] [address]        [facility] GERIATRICS  ACUTE/EPISODIC VISIT    [facility] Medical Record Number: [id number]  Place of Service where encounter took place[facility] ([facility]) [[id number]]    Chief Complaint   Patient presents with     RECHECK     HPI:    Iraj De Leon is a 74 year old (1950), who is being seen today for an episodic care visit. HPI information obtained from: facility chart records, facility staff, patient report, and [facility] chart review.    Today's concern is: Recent hospitalization with fall with left clavicle fracture, PNA. Had been on RA on TCU admission. Developed fever and hypoxia yesterday. Was negative for COVID and influenza. CXR done and resulted returned today without acute findings. Repeat Flu/Covid testing done and he was positive for Influenza A. He is coughing some. Denies any bodyaches. No fever since yesterday morning, but continues to require 3L of oxygen. He is drowsy but easily aroused and answered providers questions. Denies feeling SOB. No pain. Staff report he is drinking fluids well.       ALLERGIES: No Known Allergies   MEDICATIONS:  Post Discharge Medication Reconciliation Status: medication reconcilation previously completed during another office visit.     Current Outpatient Medications   Medication Sig Dispense Refill     acetaminophen (TYLENOL) 325 MG tablet Take 2 tablets (650 mg) by mouth.       amLODIPine (NORVASC) 5 MG tablet Take 1 tablet (5 mg) by mouth daily. 30 tablet 0     buPROPion (WELLBUTRIN XL) 150 MG 24 hr tablet Take 1 tablet (150 mg) by mouth every morning. 90 tablet 1     cholecalciferol, vitamin D3, 1,000 unit (25 mcg) tablet [CHOLECALCIFEROL, VITAMIN D3, 1,000 UNIT (25 MCG) TABLET] Take 1,000 Units by mouth daily.       escitalopram (LEXAPRO) 20 MG tablet Take 1 tablet (20 mg) by mouth daily. 90 tablet 1     lisinopril (ZESTRIL) 20 MG tablet  "TAKE 1 TABLET BY MOUTH EVERY DAY 90 tablet 2     melatonin 3 MG tablet Take 1 tablet (3 mg) by mouth at bedtime.       metFORMIN (GLUCOPHAGE XR) 500 MG 24 hr tablet Take 1 tablet (500 mg) by mouth daily (with dinner). 90 tablet 1     multivitamin therapeutic tablet [MULTIVITAMIN THERAPEUTIC TABLET] Take 1 tablet by mouth daily.       QUEtiapine (SEROQUEL) 25 MG tablet Take 1 tablet (25 mg) by mouth every 6 hours as needed (agitation, anxiety).       rosuvastatin (CRESTOR) 10 MG tablet TAKE 1 TABLET BY MOUTH EVERYDAY AT BEDTIME 90 tablet 3     Medications reviewed:  Medications reconciled to facility chart and changes were made to reflect current medications as identified as above med list. Below are the changes that were made:   Medications stopped since last EPIC medication reconciliation:   There are no discontinued medications.    Medications started since last [facility] medication reconciliation:  No orders of the defined types were placed in this encounter.        REVIEW OF SYSTEMS:  4 point ROS neg other than the symptoms noted above in the HPI.      PHYSICAL EXAM:  BP (!) 163/91   Pulse (!) 107   Temp 98.6  F (37  C)   Resp 20   Ht 1.727 m (5' 8\")   Wt 87.9 kg (193 lb 12.8 oz)   SpO2 94%   BMI 29.47 kg/m    Physical Exam  Constitutional:       Appearance: He is ill-appearing.   Pulmonary:      Effort: Pulmonary effort is normal.      Breath sounds: Normal breath sounds.      Comments: + moist cough, on 3L NC  Abdominal:      General: Bowel sounds are normal.   Neurological:      Mental Status: He is alert.   Psychiatric:         Mood and Affect: Mood normal.         Estimated Creatinine Clearance: 93.1 mL/min (based on SCr of 0.75 mg/dL).    ASSESSMENT / PLAN:  Influenza A  Acute respiratory failure with hypoxia (H)  Teste (+) today. Requiring 3L of oxygen. Fever of 101.7 yesterday, none since. Normal WBC on labs today.   - oseltamivir (TAMIFLU) 75 MG capsule; Take 1 capsule (75 mg) by mouth 2 times daily " for 5 days.  - albuterol (PROVENTIL) (2.5 MG/3ML) 0.083% neb solution; Take 1 vial (2.5 mg) by nebulization every 4 hours as needed for shortness of breath, wheezing or cough.  - cough syrup PRN   - oxygen PRN to keep sats >90%   - Tylenol PRN for fever, pain  - flu precautions x 7 days     Closed nondisplaced fracture of acromial end of left clavicle with routine healing, subsequent encounter  Personal history of fall  Physical deconditioning  Secondary to fall, Ortho recommended non operative management. Pain appears controlled, due to cognition has been removing sling.  Reviewed sling orders with nursing staff.   - analgesia with APAP PRN  - PT/OT  - NWB to LUE, sling  - follow-up with ortho     Moderate late onset Alzheimer's dementia with other behavioral disturbance (H)  Behaviors/agitation on TCU admission. PRN Seroquel ordered, staff report no concerns over the weekend.   - Seroquel PRN x 14 days  - Melatonin at bedtime  - OT completed cognitive testing  - supportive cares,  to assist with discharge planning.       Type 2 diabetes mellitus with right eye affected by mild nonproliferative retinopathy without macular edema, without long-term current use of insulin (H)  A1C 7.4%, metformin was held while IP. -340 since TCU admission  - continue metformin 500mg daily, monitor, may need to increase dose but will hold off for now given + for influenza and uncertain of oral intake.    HTN  -160 since TCU admission. Weight stable ~194lbs. Chlorthalidone stopped and started on amlodipine while IP. Want to avoid too tight of control given falls risk   - continue lisinopril, amlodipine  - monitor for edema given off diuretic.       Orders:  Tamiflu 75mg Bid x 5 days  Oxygen 1-4L Prn to keep sat >/= 90%  Albuterol nebs PRN    Electronically signed by:  NICOLETTE Alvarado CNP      Sincerely,        NICOLETTE Alvarado CNP    Electronically signed

## 2025-01-27 NOTE — TELEPHONE ENCOUNTER
Hi I am Iraj's PCP.  I just wanted you to know he has a h/o Periferal edema.  Before he had fall 3 weeks ago he was on chlorthalidone which was stopped due to hypo k and amlodipine was started.    I am concerned this will make his perif edema worse. The combo of stopping a diuretic and new agent causing c vasodilatation. Perhaps increase his lisinopril and decrease amlodipine?     When I last saw him I was hoping to [facility] or [facility] at [facility].     Thanks for you care of Iraj.    Dez

## 2025-01-27 NOTE — PROGRESS NOTES
[facility] GERIATRICS  ACUTE/EPISODIC VISIT    [facility] Medical Record Number: [id number]  Place of Service where encounter took place: [facility] [place] [facility] ([facility]) [[id number]]    Chief Complaint   Patient presents with    RECHECK     HPI:    Iraj De Leon is a 74 year old (1950), who is being seen today for an episodic care visit. HPI information obtained from: facility chart records, facility staff, patient report, and [facility] chart review.    Today's concern is: Recent hospitalization with fall with left clavicle fracture, PNA. Had been on RA on TCU admission. Developed fever and hypoxia yesterday. Was negative for COVID and influenza. CXR done and resulted returned today without acute findings. Repeat Flu/Covid testing done and he was positive for Influenza A. He is coughing some. Denies any bodyaches. No fever since yesterday morning, but continues to require 3L of oxygen. He is drowsy but easily aroused and answered providers questions. Denies feeling SOB. No pain. Staff report he is drinking fluids well.       ALLERGIES: No Known Allergies   MEDICATIONS:  Post Discharge Medication Reconciliation Status: medication reconcilation previously completed during another office visit.     Current Outpatient Medications   Medication Sig Dispense Refill    acetaminophen (TYLENOL) 325 MG tablet Take 2 tablets (650 mg) by mouth.      amLODIPine (NORVASC) 5 MG tablet Take 1 tablet (5 mg) by mouth daily. 30 tablet 0    buPROPion (WELLBUTRIN XL) 150 MG 24 hr tablet Take 1 tablet (150 mg) by mouth every morning. 90 tablet 1    cholecalciferol, vitamin D3, 1,000 unit (25 mcg) tablet [CHOLECALCIFEROL, VITAMIN D3, 1,000 UNIT (25 MCG) TABLET] Take 1,000 Units by mouth daily.      escitalopram (LEXAPRO) 20 MG tablet Take 1 tablet (20 mg) by mouth daily. 90 tablet 1    lisinopril (ZESTRIL) 20 MG tablet TAKE 1 TABLET BY MOUTH EVERY DAY 90 tablet 2    melatonin 3 MG tablet Take 1 tablet (3 mg) by  "mouth at bedtime.      metFORMIN (GLUCOPHAGE XR) 500 MG 24 hr tablet Take 1 tablet (500 mg) by mouth daily (with dinner). 90 tablet 1    multivitamin therapeutic tablet [MULTIVITAMIN THERAPEUTIC TABLET] Take 1 tablet by mouth daily.      QUEtiapine (SEROQUEL) 25 MG tablet Take 1 tablet (25 mg) by mouth every 6 hours as needed (agitation, anxiety).      rosuvastatin (CRESTOR) 10 MG tablet TAKE 1 TABLET BY MOUTH EVERYDAY AT BEDTIME 90 tablet 3     Medications reviewed:  Medications reconciled to facility chart and changes were made to reflect current medications as identified as above med list. Below are the changes that were made:   Medications stopped since last EPIC medication reconciliation:   There are no discontinued medications.    Medications started since last [facility] medication reconciliation:  No orders of the defined types were placed in this encounter.        REVIEW OF SYSTEMS:  4 point ROS neg other than the symptoms noted above in the HPI.      PHYSICAL EXAM:  BP (!) 163/91   Pulse (!) 107   Temp 98.6  F (37  C)   Resp 20   Ht 1.727 m (5' 8\")   Wt 87.9 kg (193 lb 12.8 oz)   SpO2 94%   BMI 29.47 kg/m    Physical Exam  Constitutional:       Appearance: He is ill-appearing.   Pulmonary:      Effort: Pulmonary effort is normal.      Breath sounds: Normal breath sounds.      Comments: + moist cough, on 3L NC  Abdominal:      General: Bowel sounds are normal.   Neurological:      Mental Status: He is alert.   Psychiatric:         Mood and Affect: Mood normal.         Estimated Creatinine Clearance: 93.1 mL/min (based on SCr of 0.75 mg/dL).    ASSESSMENT / PLAN:  Influenza A  Acute respiratory failure with hypoxia (H)  Teste (+) today. Requiring 3L of oxygen. Fever of 101.7 yesterday, none since. Normal WBC on labs today.   - oseltamivir (TAMIFLU) 75 MG capsule; Take 1 capsule (75 mg) by mouth 2 times daily for 5 days.  - albuterol (PROVENTIL) (2.5 MG/3ML) 0.083% neb solution; Take 1 vial (2.5 mg) by " nebulization every 4 hours as needed for shortness of breath, wheezing or cough.  - cough syrup PRN   - oxygen PRN to keep sats >90%   - Tylenol PRN for fever, pain  - flu precautions x 7 days     Closed nondisplaced fracture of acromial end of left clavicle with routine healing, subsequent encounter  Personal history of fall  Physical deconditioning  Secondary to fall, Ortho recommended non operative management. Pain appears controlled, due to cognition has been removing sling.  Reviewed sling orders with nursing staff.   - analgesia with APAP PRN  - PT/OT  - NWB to LUE, sling  - follow-up with ortho     Moderate late onset Alzheimer's dementia with other behavioral disturbance (H)  Behaviors/agitation on TCU admission. PRN Seroquel ordered, staff report no concerns over the weekend.   - Seroquel PRN x 14 days  - Melatonin at bedtime  - OT completed cognitive testing  - supportive cares,  to assist with discharge planning.       Type 2 diabetes mellitus with right eye affected by mild nonproliferative retinopathy without macular edema, without long-term current use of insulin (H)  A1C 7.4%, metformin was held while IP. -340 since TCU admission  - continue metformin 500mg daily, monitor, may need to increase dose but will hold off for now given + for influenza and uncertain of oral intake.    HTN  -160 since TCU admission. Weight stable ~194lbs. Chlorthalidone stopped and started on amlodipine while IP. Want to avoid too tight of control given falls risk   - continue lisinopril, amlodipine  - monitor for edema given off diuretic.       Orders:  Tamiflu 75mg Bid x 5 days  Oxygen 1-4L Prn to keep sat >/= 90%  Albuterol nebs PRN    Electronically signed by:  NICOLETTE Alvarado CNP

## 2025-01-31 ENCOUNTER — MYC MEDICAL ADVICE (OUTPATIENT)
Dept: FAMILY MEDICINE | Facility: CLINIC | Age: 75
End: 2025-01-31
Payer: MEDICARE

## 2025-02-06 ENCOUNTER — TRANSITIONAL CARE UNIT VISIT (OUTPATIENT)
Dept: GERIATRICS | Facility: CLINIC | Age: 75
End: 2025-02-06
Payer: MEDICARE

## 2025-02-06 VITALS
TEMPERATURE: 98 F | DIASTOLIC BLOOD PRESSURE: 81 MMHG | WEIGHT: 188 LBS | RESPIRATION RATE: 18 BRPM | SYSTOLIC BLOOD PRESSURE: 137 MMHG | BODY MASS INDEX: 28.49 KG/M2 | HEIGHT: 68 IN | HEART RATE: 79 BPM | OXYGEN SATURATION: 93 %

## 2025-02-06 DIAGNOSIS — I10 ESSENTIAL HYPERTENSION: ICD-10-CM

## 2025-02-06 DIAGNOSIS — J10.1 INFLUENZA A: Primary | ICD-10-CM

## 2025-02-06 DIAGNOSIS — R53.81 PHYSICAL DECONDITIONING: ICD-10-CM

## 2025-02-06 DIAGNOSIS — F02.B18 MODERATE LATE ONSET ALZHEIMER'S DEMENTIA WITH OTHER BEHAVIORAL DISTURBANCE (H): ICD-10-CM

## 2025-02-06 DIAGNOSIS — G35 MULTIPLE SCLEROSIS (H): ICD-10-CM

## 2025-02-06 DIAGNOSIS — Z91.81 PERSONAL HISTORY OF FALL: ICD-10-CM

## 2025-02-06 DIAGNOSIS — G30.1 MODERATE LATE ONSET ALZHEIMER'S DEMENTIA WITH OTHER BEHAVIORAL DISTURBANCE (H): ICD-10-CM

## 2025-02-06 DIAGNOSIS — S42.035D CLOSED NONDISPLACED FRACTURE OF ACROMIAL END OF LEFT CLAVICLE WITH ROUTINE HEALING, SUBSEQUENT ENCOUNTER: ICD-10-CM

## 2025-02-06 RX ORDER — LISINOPRIL 20 MG/1
20 TABLET ORAL 2 TIMES DAILY
Status: SHIPPED
Start: 2025-02-06

## 2025-02-06 NOTE — LETTER
2/6/2025      Iraj De Leon  [address]  [address]  [place] [address]        [facility] GERIATRICS  ACUTE/EPISODIC VISIT    [facility] Medical Record Number:  [id number]  Place of Service where encounter took place:  [facility] ([facility]) [[id number]]    Chief Complaint   Patient presents with     RECHECK       HPI:    Iraj De Leon is a 74 year old  (1950), who is being seen today for an episodic care visit.  HPI information obtained from: facility chart records, facility staff, patient report, and [facility] chart review.    Today's concern is: Recent hospitalization with fall with left clavicle fracture. Tested positive for Influenza A in TCU, now off precautions. Is seen in room sitting up doing puzzle. He reports that he is feeling well. Is not having any pain. Ate breakfast, but does not remember what it was. He did have a fall on 2/5, attempting to self transfer from wheelchair to bed, no injury. Continues to have difficulty following commands. Has been given PRN Seroquel for anxiety and restlessness x 3 in the past 2 weeks, is documented to have been effective. No falls within 24 hours of administration of this.       ALLERGIES:  No Known Allergies     MEDICATIONS:  Post Discharge Medication Reconciliation Status: medication reconcilation previously completed during another office visit.     Current Outpatient Medications   Medication Sig Dispense Refill     acetaminophen (TYLENOL) 325 MG tablet Take 2 tablets (650 mg) by mouth.       albuterol (PROVENTIL) (2.5 MG/3ML) 0.083% neb solution Take 1 vial (2.5 mg) by nebulization every 4 hours as needed for shortness of breath, wheezing or cough.       amLODIPine (NORVASC) 5 MG tablet Take 1 tablet (5 mg) by mouth daily. 30 tablet 0     buPROPion (WELLBUTRIN XL) 150 MG 24 hr tablet Take 1 tablet (150 mg) by mouth every morning. 90 tablet 1     cholecalciferol, vitamin D3, 1,000 unit (25 mcg) tablet [CHOLECALCIFEROL,  "VITAMIN D3, 1,000 UNIT (25 MCG) TABLET] Take 1,000 Units by mouth daily.       escitalopram (LEXAPRO) 20 MG tablet Take 1 tablet (20 mg) by mouth daily. 90 tablet 1     guaiFENesin (ROBITUSSIN) 20 mg/mL liquid Take 10 mLs (200 mg) by mouth every 4 hours as needed for cough.       lisinopril (ZESTRIL) 20 MG tablet TAKE 1 TABLET BY MOUTH EVERY DAY 90 tablet 2     melatonin 3 MG tablet Take 1 tablet (3 mg) by mouth at bedtime.       metFORMIN (GLUCOPHAGE XR) 500 MG 24 hr tablet Take 1 tablet (500 mg) by mouth daily (with dinner). 90 tablet 1     multivitamin therapeutic tablet [MULTIVITAMIN THERAPEUTIC TABLET] Take 1 tablet by mouth daily.       QUEtiapine (SEROQUEL) 25 MG tablet Take 1 tablet (25 mg) by mouth every 6 hours as needed (agitation, anxiety).       rosuvastatin (CRESTOR) 10 MG tablet TAKE 1 TABLET BY MOUTH EVERYDAY AT BEDTIME 90 tablet 3     Medications reviewed:  Medications reconciled to facility chart and changes were made to reflect current medications as identified as above med list. Below are the changes that were made:   Medications stopped since last EPIC medication reconciliation:   There are no discontinued medications.    Medications started since last [facility] medication reconciliation:  No orders of the defined types were placed in this encounter.        REVIEW OF SYSTEMS:  4 point ROS neg other than the symptoms noted above in the HPI.      PHYSICAL EXAM:  /81   Pulse 79   Temp 98  F (36.7  C)   Resp 18   Ht 1.727 m (5' 8\")   Wt 85.3 kg (188 lb)   SpO2 93%   BMI 28.59 kg/m    Physical Exam  Cardiovascular:      Rate and Rhythm: Normal rate and regular rhythm.      Heart sounds: Normal heart sounds.   Pulmonary:      Effort: Pulmonary effort is normal.      Breath sounds: Normal breath sounds.   Abdominal:      General: Bowel sounds are normal.      Palpations: Abdomen is soft.   Musculoskeletal:      Right lower leg: Edema present.      Left lower leg: Edema present.   Neurological: "      Mental Status: He is alert.      Motor: Weakness present.   Psychiatric:         Mood and Affect: Mood normal.      Comments: Impaired memory and judgement           ASSESSMENT / PLAN:  Influenza A  Appears resolved, staff report no cough. On Ra, off precautions. Completed course of Tamiflu  - supportive cares   - cough syrup PRN    Closed nondisplaced fracture of acromial end of left clavicle with routine healing, subsequent encounter  Personal history of fall  Physical deconditioning  Secondary to fall, Ortho recommended non operative management. Denies pain. Due to cognition has been removing sling at times, wearing on visit today.  - analgesia with APAP PRN  - PT/OT  - NWB to LUE, sling  - follow-up with ortho later today  - Wheelchair Order per recommendation of PT    Multiple sclerosis (H)  History of multiple falls. Unsteady gait. Therapy is recommending custom wheelchair for safety.   - Wheelchair Order    Essential hypertension  -160. Chlorthalidone stopped and started on amlodipine while IP. Has some BLE, PCP reached out and reports previous issues with edema, is concerned amlodipine might make worse.   - lisinopril (ZESTRIL) 20 MG tablet; Take 1 tablet (20 mg) by mouth 2 times daily (increasing from daily)  - discontinue amlodipine  - BMP in 1 week.     Dementia  Significant, continues to have some behaviors is restless and agitated at times. Has received PRN Seroquel 3 times in past 2 weeks and was effective. He is impulsive.  Discussed with nursing staff, therapy  - Noted PRN orders for antipsychotic medications are limited to 14 days. Face to Face encounter done today. Evaluation of Seroquel medication indicates non-pharmacological interventions of redirections, distraction, are not effective,, without this medication the patient's safety is in jeopardy due to agitation towards staff and restlessness,, and it is appropriate and necessary to continue this medication for 14 days . Nsg to  update [facility] provider before day 14 if greater than 3 uses in the last 5 days.     .         Orders:  Discontinue oxygen  Continue PRN seroquel x 24 days   Wheelchair  Discontinue amlodipine  Increase lisinopril to 20mg BID  BMP on 2/13    Electronically signed by  NICOLETTE Alvarado CNP      DME (Durable Medical Equipment) Orders and Documentation  Orders Placed This Encounter   Procedures     Wheelchair Order        The patient was assessed and it was determined the patient is in need of the following listed DME Supplies/Equipment. Please complete supporting documentation below to demonstrate medical necessity.        He has mobility limitations due to weakness in the setting of MS and dementia  that impairs their ability to participate in one or more mobility related activities: Toileting, Feeding, Grooming, and Bathing. The wheelchair is suitable and necessary for use in his  home. His mobility limitations cannot be safely resolved by using a cane/walker due to impaired ambulation due to  history of falls, advanced dementia. His  home has adequate access to use a manual wheelchair. The use of a manual wheelchair on a regular basis will improve the his ability to participate in mobility related ADL's at home. He  is willing to use a manual wheelchair at home. He has adequate upper body strength and the mental capability to safely use a manual wheelchair and/or has a caregiver that is able to assist. He does need lightweight wheelchair given small stature to allow him to self propel chair. He does have a lower extremity injury or edema. He would benefit from lifetime use of custom wheelchair to improve mobility, reduce falls and improve quality of life.           Sincerely,        NICOLETTE Alvarado CNP    Electronically signed

## 2025-02-06 NOTE — PROGRESS NOTES
[facility] GERIATRICS  ACUTE/EPISODIC VISIT    [facility] Medical Record Number:  [id number]  Place of Service where encounter took place:  [facility] ([facility]) [[id number]]    Chief Complaint   Patient presents with    RECHECK       HPI:    Iraj De Leon is a 74 year old  (1950), who is being seen today for an episodic care visit.  HPI information obtained from: facility chart records, facility staff, patient report, and [facility] chart review.    Today's concern is: Recent hospitalization with fall with left clavicle fracture. Tested positive for Influenza A in TCU, now off precautions. Is seen in room sitting up doing puzzle. He reports that he is feeling well. Is not having any pain. Ate breakfast, but does not remember what it was. He did have a fall on 2/5, attempting to self transfer from wheelchair to bed, no injury. Continues to have difficulty following commands. Has been given PRN Seroquel for anxiety and restlessness x 3 in the past 2 weeks, is documented to have been effective. No falls within 24 hours of administration of this.       ALLERGIES:  No Known Allergies     MEDICATIONS:  Post Discharge Medication Reconciliation Status: medication reconcilation previously completed during another office visit.     Current Outpatient Medications   Medication Sig Dispense Refill    acetaminophen (TYLENOL) 325 MG tablet Take 2 tablets (650 mg) by mouth.      albuterol (PROVENTIL) (2.5 MG/3ML) 0.083% neb solution Take 1 vial (2.5 mg) by nebulization every 4 hours as needed for shortness of breath, wheezing or cough.      amLODIPine (NORVASC) 5 MG tablet Take 1 tablet (5 mg) by mouth daily. 30 tablet 0    buPROPion (WELLBUTRIN XL) 150 MG 24 hr tablet Take 1 tablet (150 mg) by mouth every morning. 90 tablet 1    cholecalciferol, vitamin D3, 1,000 unit (25 mcg) tablet [CHOLECALCIFEROL, VITAMIN D3, 1,000 UNIT (25 MCG) TABLET] Take 1,000 Units by mouth daily.      escitalopram  "(LEXAPRO) 20 MG tablet Take 1 tablet (20 mg) by mouth daily. 90 tablet 1    guaiFENesin (ROBITUSSIN) 20 mg/mL liquid Take 10 mLs (200 mg) by mouth every 4 hours as needed for cough.      lisinopril (ZESTRIL) 20 MG tablet TAKE 1 TABLET BY MOUTH EVERY DAY 90 tablet 2    melatonin 3 MG tablet Take 1 tablet (3 mg) by mouth at bedtime.      metFORMIN (GLUCOPHAGE XR) 500 MG 24 hr tablet Take 1 tablet (500 mg) by mouth daily (with dinner). 90 tablet 1    multivitamin therapeutic tablet [MULTIVITAMIN THERAPEUTIC TABLET] Take 1 tablet by mouth daily.      QUEtiapine (SEROQUEL) 25 MG tablet Take 1 tablet (25 mg) by mouth every 6 hours as needed (agitation, anxiety).      rosuvastatin (CRESTOR) 10 MG tablet TAKE 1 TABLET BY MOUTH EVERYDAY AT BEDTIME 90 tablet 3     Medications reviewed:  Medications reconciled to facility chart and changes were made to reflect current medications as identified as above med list. Below are the changes that were made:   Medications stopped since last EPIC medication reconciliation:   There are no discontinued medications.    Medications started since last [facility] medication reconciliation:  No orders of the defined types were placed in this encounter.        REVIEW OF SYSTEMS:  4 point ROS neg other than the symptoms noted above in the HPI.      PHYSICAL EXAM:  /81   Pulse 79   Temp 98  F (36.7  C)   Resp 18   Ht 1.727 m (5' 8\")   Wt 85.3 kg (188 lb)   SpO2 93%   BMI 28.59 kg/m    Physical Exam  Cardiovascular:      Rate and Rhythm: Normal rate and regular rhythm.      Heart sounds: Normal heart sounds.   Pulmonary:      Effort: Pulmonary effort is normal.      Breath sounds: Normal breath sounds.   Abdominal:      General: Bowel sounds are normal.      Palpations: Abdomen is soft.   Musculoskeletal:      Right lower leg: Edema present.      Left lower leg: Edema present.   Neurological:      Mental Status: He is alert.      Motor: Weakness present.   Psychiatric:         Mood and " Affect: Mood normal.      Comments: Impaired memory and judgement           ASSESSMENT / PLAN:  Influenza A  Appears resolved, staff report no cough. On Ra, off precautions. Completed course of Tamiflu  - supportive cares   - cough syrup PRN    Closed nondisplaced fracture of acromial end of left clavicle with routine healing, subsequent encounter  Personal history of fall  Physical deconditioning  Secondary to fall, Ortho recommended non operative management. Denies pain. Due to cognition has been removing sling at times, wearing on visit today.  - analgesia with APAP PRN  - PT/OT  - NWB to LUE, sling  - follow-up with ortho later today  - Wheelchair Order per recommendation of PT    Multiple sclerosis (H)  History of multiple falls. Unsteady gait. Therapy is recommending custom wheelchair for safety.   - Wheelchair Order    Essential hypertension  -160. Chlorthalidone stopped and started on amlodipine while IP. Has some BLE, PCP reached out and reports previous issues with edema, is concerned amlodipine might make worse.   - lisinopril (ZESTRIL) 20 MG tablet; Take 1 tablet (20 mg) by mouth 2 times daily (increasing from daily)  - discontinue amlodipine  - BMP in 1 week.     Dementia  Significant, continues to have some behaviors is restless and agitated at times. Has received PRN Seroquel 3 times in past 2 weeks and was effective. He is impulsive.  Discussed with nursing staff, therapy  - Noted PRN orders for antipsychotic medications are limited to 14 days. Face to Face encounter done today. Evaluation of Seroquel medication indicates non-pharmacological interventions of redirections, distraction, are not effective,, without this medication the patient's safety is in jeopardy due to agitation towards staff and restlessness,, and it is appropriate and necessary to continue this medication for 14 days . Nsg to update [facility] provider before day 14 if greater than 3 uses in the last 5 days.     .          Orders:  Discontinue oxygen  Continue PRN seroquel x 24 days   Wheelchair  Discontinue amlodipine  Increase lisinopril to 20mg BID  BMP on 2/13    Electronically signed by  NICOLETTE Alvarado CNP      DME (Durable Medical Equipment) Orders and Documentation  Orders Placed This Encounter   Procedures    Wheelchair Order        The patient was assessed and it was determined the patient is in need of the following listed DME Supplies/Equipment. Please complete supporting documentation below to demonstrate medical necessity.        He has mobility limitations due to weakness in the setting of MS and dementia  that impairs their ability to participate in one or more mobility related activities: Toileting, Feeding, Grooming, and Bathing. The wheelchair is suitable and necessary for use in his  home. His mobility limitations cannot be safely resolved by using a cane/walker due to impaired ambulation due to  history of falls, advanced dementia. His  home has adequate access to use a manual wheelchair. The use of a manual wheelchair on a regular basis will improve the his ability to participate in mobility related ADL's at home. He  is willing to use a manual wheelchair at home. He has adequate upper body strength and the mental capability to safely use a manual wheelchair and/or has a caregiver that is able to assist. He does need lightweight wheelchair given small stature to allow him to self propel chair. He does have a lower extremity injury or edema. He would benefit from lifetime use of custom wheelchair to improve mobility, reduce falls and improve quality of life.

## 2025-02-09 ENCOUNTER — MYC MEDICAL ADVICE (OUTPATIENT)
Dept: FAMILY MEDICINE | Facility: CLINIC | Age: 75
End: 2025-02-09
Payer: MEDICARE

## 2025-02-09 ENCOUNTER — LAB REQUISITION (OUTPATIENT)
Dept: LAB | Facility: CLINIC | Age: 75
End: 2025-02-09

## 2025-02-09 DIAGNOSIS — I10 ESSENTIAL (PRIMARY) HYPERTENSION: ICD-10-CM

## 2025-02-10 ENCOUNTER — DISCHARGE SUMMARY NURSING HOME (OUTPATIENT)
Dept: GERIATRICS | Facility: CLINIC | Age: 75
End: 2025-02-10
Payer: MEDICARE

## 2025-02-10 VITALS
TEMPERATURE: 32.4 F | SYSTOLIC BLOOD PRESSURE: 125 MMHG | BODY MASS INDEX: 29.64 KG/M2 | HEIGHT: 68 IN | DIASTOLIC BLOOD PRESSURE: 76 MMHG | HEART RATE: 76 BPM | OXYGEN SATURATION: 92 % | RESPIRATION RATE: 18 BRPM | WEIGHT: 195.6 LBS

## 2025-02-10 DIAGNOSIS — E11.3291 TYPE 2 DIABETES MELLITUS WITH RIGHT EYE AFFECTED BY MILD NONPROLIFERATIVE RETINOPATHY WITHOUT MACULAR EDEMA, WITHOUT LONG-TERM CURRENT USE OF INSULIN (H): ICD-10-CM

## 2025-02-10 DIAGNOSIS — R53.81 PHYSICAL DECONDITIONING: ICD-10-CM

## 2025-02-10 DIAGNOSIS — F33.2 MDD (MAJOR DEPRESSIVE DISORDER), RECURRENT SEVERE, WITHOUT PSYCHOSIS (H): ICD-10-CM

## 2025-02-10 DIAGNOSIS — F02.B18 MODERATE LATE ONSET ALZHEIMER'S DEMENTIA WITH OTHER BEHAVIORAL DISTURBANCE (H): ICD-10-CM

## 2025-02-10 DIAGNOSIS — Z91.81 PERSONAL HISTORY OF FALL: ICD-10-CM

## 2025-02-10 DIAGNOSIS — E78.2 MIXED HYPERLIPIDEMIA: ICD-10-CM

## 2025-02-10 DIAGNOSIS — J18.9 PNEUMONIA OF LEFT LOWER LOBE DUE TO INFECTIOUS ORGANISM: ICD-10-CM

## 2025-02-10 DIAGNOSIS — G35 MULTIPLE SCLEROSIS (H): ICD-10-CM

## 2025-02-10 DIAGNOSIS — Z71.89 ACP (ADVANCE CARE PLANNING): ICD-10-CM

## 2025-02-10 DIAGNOSIS — G30.1 MODERATE LATE ONSET ALZHEIMER'S DEMENTIA WITH OTHER BEHAVIORAL DISTURBANCE (H): ICD-10-CM

## 2025-02-10 DIAGNOSIS — J10.1 INFLUENZA A: ICD-10-CM

## 2025-02-10 DIAGNOSIS — S42.035D CLOSED NONDISPLACED FRACTURE OF ACROMIAL END OF LEFT CLAVICLE WITH ROUTINE HEALING, SUBSEQUENT ENCOUNTER: Primary | ICD-10-CM

## 2025-02-10 DIAGNOSIS — I10 ESSENTIAL HYPERTENSION: ICD-10-CM

## 2025-02-10 LAB
ANION GAP SERPL CALCULATED.3IONS-SCNC: 9 MMOL/L (ref 7–15)
BUN SERPL-MCNC: 17.2 MG/DL (ref 8–23)
CALCIUM SERPL-MCNC: 8.9 MG/DL (ref 8.8–10.4)
CHLORIDE SERPL-SCNC: 101 MMOL/L (ref 98–107)
CREAT SERPL-MCNC: 0.74 MG/DL (ref 0.67–1.17)
EGFRCR SERPLBLD CKD-EPI 2021: >90 ML/MIN/1.73M2
GLUCOSE SERPL-MCNC: 159 MG/DL (ref 70–99)
HCO3 SERPL-SCNC: 32 MMOL/L (ref 22–29)
POTASSIUM SERPL-SCNC: 3.7 MMOL/L (ref 3.4–5.3)
SODIUM SERPL-SCNC: 142 MMOL/L (ref 135–145)

## 2025-02-10 PROCEDURE — P9604 ONE-WAY ALLOW PRORATED TRIP: HCPCS | Performed by: NURSE PRACTITIONER

## 2025-02-10 PROCEDURE — 99316 NF DSCHRG MGMT 30 MIN+: CPT | Performed by: NURSE PRACTITIONER

## 2025-02-10 PROCEDURE — 36415 COLL VENOUS BLD VENIPUNCTURE: CPT | Performed by: NURSE PRACTITIONER

## 2025-02-10 PROCEDURE — 80048 BASIC METABOLIC PNL TOTAL CA: CPT | Performed by: NURSE PRACTITIONER

## 2025-02-10 PROCEDURE — 82374 ASSAY BLOOD CARBON DIOXIDE: CPT | Performed by: NURSE PRACTITIONER

## 2025-02-10 PROCEDURE — 82310 ASSAY OF CALCIUM: CPT | Performed by: NURSE PRACTITIONER

## 2025-02-10 RX ORDER — SENNA AND DOCUSATE SODIUM 50; 8.6 MG/1; MG/1
2 TABLET, FILM COATED ORAL AT BEDTIME
COMMUNITY
Start: 2025-02-10

## 2025-02-10 NOTE — PROGRESS NOTES
[facility] GERIATRICS DISCHARGE SUMMARY  Patient Name: Iraj De Leon  YOB: 1950  [facility] Medical Record Number: [id number]  [facility] [[id number]]    PRIMARY CARE PROVIDER AND CLINIC RESPONSIBLE AFTER TRANSFER: Dez Sinclair MD, [id number] / [place] [address]; G Provider     Transferring providers: NICOLETTE Jones CNP; Gerhard Patiño MD  Recent Hospitalization/ED: [facility] stay 1/17/25 to 1/21/25.  Date of SNF Admission: January 21, 2025  Date of [facility] (anticipated) Discharge: February 13, 2025  Discharged to: new assisted living for patient [facility] memory [facility]  Cognitive Scores: BIMS: 9/15  Physical Function: Ambulating 40 ft with Ax1, wheelchair follow  DME: Wheelchair and DME F2F done 2/6/25    CODE STATUS/ADVANCE DIRECTIVES DISCUSSION: Prior DNR/DNI  ALLERGIES: Patient has no known allergies.    NURSING FACILITY COURSE:  Medication Changes/Rationale:   Started on PRN Seroquel for restlessness/anxiety  Amlodipine discontinued due to edema and lisinopril increased to BID for BP control   Added senna-s for constipation    Summary of nursing facility stay:  Brief Hospital Course: PMH of Dm2, HLD, HTN, depression, MS, Alzheimer's dementia who presented after a fall. Found to have left clavicular fracture. Ortho consulted and recommended non operative management, NWB javier SOTO. Was noted to have hypoxia, started on supplemental oxygen. CXR showed left sided infiltrates, received 3 days of IV Zosyn, then transitioned to oral Augmentin. Chlorthalidone held due to hypokalemia, started on Norvasc. Did have some behaviors due to his dementia while IP, responded well to a one time dose of Seroquel. When medically stable was discharged to TCU for further rehab and medical management.      Closed nondisplaced fracture of acromial end of left clavicle with routine healing,  subsequent encounter  Personal history of fall  Physical deconditioning  Secondary to fall, Ortho recommended non operative management. Denies pain. Due to cognition has been removing sling at times. Had follow-up with ortho on 2/6, ok for elbow and wrist ROM, pendulum swings. Xray showed good alignment.   - analgesia with APAP PRN  - NWB to LUE, sling through 2/14  - follow-up with ortho later today    Influenza A  Tested positive in TCU. Did require supplemental oxygen. Completed course of Tamiflu, back on RA. Resolved     Multiple sclerosis (H)  History of multiple falls. Unsteady gait. Therapy is recommending custom wheelchair for safety, being measured for this today.   [facility]     Essential hypertension  Chlorthalidone stopped and started on amlodipine while IP. Has some BLE, PCP reached out and reports previous issues with edema, is concerned amlodipine might make worse. Amlodipine discontinued and lisinopril increased to BID. BP now running 110-160  - continue lisinopril to 20mg BID   - BMP on 2/13    Moderate late onset Alzheimer's dementia with other behavioral disturbance (H)  Advanced. Is planning to move to memory care. PRN Seroquel started in TCU as had some anxiety/agitation on admission which has much improved.   - will continue PRN seroquel on discharge to [facility] memory care as will likely need while he transitions to new facility     Type 2 diabetes mellitus with right eye affected by mild nonproliferative retinopathy without macular edema, without long-term current use of insulin (H)  A1C 7.4%, BG typically 140-200,   - discontinue BG checks  - continue metformin 500mg daily      Mixed hyperlipidemia  - continue Crestor     MDD (major depressive disorder), recurrent severe, without psychosis (H)  - continue Wellbutrin     Pneumonia of left lower lobe due to infectious organism  Resolved. Has been on RA since TCU admission. No coughing.  Completed course of Augmentin    ACP  (advance care planning)  Reviewed POLST/goals of care with spouse. He currently DNR/comfort cares. She says he would not want to prolong his life given his advanced dementia. Also would not want any further surgery. Wants to avoid hospitalization.       Discharge Medications:  MED REC REQUIRED  Post Medication Reconciliation Status: medication reconcilation previously completed during another office visit    Current Outpatient Medications   Medication Sig Dispense Refill    acetaminophen (TYLENOL) 325 MG tablet Take 2 tablets (650 mg) by mouth.      albuterol (PROVENTIL) (2.5 MG/3ML) 0.083% neb solution Take 1 vial (2.5 mg) by nebulization every 4 hours as needed for shortness of breath, wheezing or cough.      buPROPion (WELLBUTRIN XL) 150 MG 24 hr tablet Take 1 tablet (150 mg) by mouth every morning. 90 tablet 1    cholecalciferol, vitamin D3, 1,000 unit (25 mcg) tablet [CHOLECALCIFEROL, VITAMIN D3, 1,000 UNIT (25 MCG) TABLET] Take 1,000 Units by mouth daily.      escitalopram (LEXAPRO) 20 MG tablet Take 1 tablet (20 mg) by mouth daily. 90 tablet 1    guaiFENesin (ROBITUSSIN) 20 mg/mL liquid Take 10 mLs (200 mg) by mouth every 4 hours as needed for cough.      lisinopril (ZESTRIL) 20 MG tablet Take 1 tablet (20 mg) by mouth 2 times daily.      melatonin 3 MG tablet Take 1 tablet (3 mg) by mouth at bedtime.      metFORMIN (GLUCOPHAGE XR) 500 MG 24 hr tablet Take 1 tablet (500 mg) by mouth daily (with dinner). 90 tablet 1    multivitamin therapeutic tablet [MULTIVITAMIN THERAPEUTIC TABLET] Take 1 tablet by mouth daily.      QUEtiapine (SEROQUEL) 25 MG tablet Take 1 tablet (25 mg) by mouth every 6 hours as needed (agitation, anxiety).      rosuvastatin (CRESTOR) 10 MG tablet TAKE 1 TABLET BY MOUTH EVERYDAY AT BEDTIME 90 tablet 3     Controlled medications:   not applicable/none     Past Medical History:   Past Medical History:   Diagnosis Date    Arthritis     Cancer (H) 02/2020    basal cell Moh's forehead     "Diabetes mellitus (H)     type 2, diet controlled as of March 2019    Headache 2014    Due to spontaneous spinal fluid leak    Hyperlipidemia     Hypertension     Kidney stones 2019    Multiple sclerosis (H)     Osteoarthritis     bilateral hips    Pituitary microadenoma (H)     Posterior vitreous detachment      Physical Exam:   Vitals: /76   Pulse 76   Temp (!) 32.4  F (0.2  C)   Resp 18   Ht 1.727 m (5' 8\")   Wt 88.7 kg (195 lb 9.6 oz)   SpO2 92%   BMI 29.74 kg/m    BMI: Body mass index is 29.74 kg/m .  GENERAL APPEARANCE:  Alert, in no distress, cooperative,   RESP:  lungs clear to auscultation , no respiratory distress   CV:  regular rate and rhythm, no murmur, rub, or gallop, 1-2+ BLE edema  ABDOMEN:  bowel sounds normal,   M/S:   LUE in sling  NEURO:   Cn 2-12 grossly intact, generalized weakness  PSYCH:  affect and mood normal, impaired memory and judgement       SNF Labs: Recent labs in [facility] reviewed by me today.      Screening at Discharge:  Reason for TCU admission: Fall with clavicle fracture, PNA  Driving prior to admission? No       DISCHARGE PLAN:  Follow up labs: No labs orders/due  Medical Follow Up:   Follow up with primary care provider in 2 weeks  [facility] scheduled appointments: None.   Discharge Services: Home Care: Physical Therapy, Occupational Therapy, Registered Nurse, Home Health Aide.  Discharge Instructions Verbalized to Patient at Discharge:   24-hour supervision is recommended for safety.     TOTAL DISCHARGE TIME: Greater than 30 minutes  Electronically signed by:  NICOLETTE Alvarado CNP    Documentation of Face to Face and Certification for Home Health Services    I certify that services are/were furnished while this patient was under the care of a physician and that a physician or an allowed non-physician practitioner (NPP), had a face-to-face encounter that meets the physician face-to-face encounter requirements. The encounter was in whole, or in " part, related to the primary reason for home health. The patient is confined to his/her home and needs intermittent skilled nursing, physical therapy, speech-language pathology, or the continued need for occupational therapy. A plan of care has been established by a physician and is periodically reviewed by a physician.  Date of Face-to-Face Encounter: 2/10/2025.    I certify that, based on my findings, the following services are medically necessary home health services: Nursing, Occupational Therapy, Physical Therapy, and home health aide .    My clinical findings support the need for the above skilled services because: Requires assistance of another person or specialized equipment to access medical services because patient: Is prone to wander/get lost without assistance. and Requires supervision of another for safe transfer...    Patient to re-establish plan of care with their PCP within 7-10 days after leaving the facility to reestablish care.  Medicare certified PECOS provider: NICOLETTE Alvarado CNP  Date: February 10, 2025    Dr.Yasser Haroon MD, signing F2F and only signing for initial order. Please send all follow up questions and concerns or needed follow up signatures to the PCP, who [place] has on file as:  Dez Sinclair.

## 2025-02-10 NOTE — LETTER
2/10/2025      Iraj De Leon  [address]  [place] [address]        [facility] GERIATRICS DISCHARGE SUMMARY  Patient Name: Iraj De Leon  YOB: 1950  [place] Medical Record Number: [id number]  Place of Service Where Encounter Took Place[facility] ([facility]) [[id number]]    PRIMARY CARE PROVIDER AND CLINIC RESPONSIBLE AFTER TRANSFER: Dez Sinclair MD, [address] / [place] [address]; [facility] Provider     Transferring providers: NICOLETTE Jones CNP; Gerhard Patiño MD  Recent Hospitalization/ED: [facility] stay 1/17/25 to 1/21/25.  Date of SNF Admission: January 21, 2025  Date of SNF (anticipated) Discharge: February 13, 2025  Discharged to: new assisted living for patient [facility] memory care  Cognitive Scores: BIMS: 9/15  Physical Function: Ambulating 40 ft with Ax1, wheelchair follow  DME: Wheelchair and DME F2F done 2/6/25    CODE STATUS/ADVANCE DIRECTIVES DISCUSSION: Prior DNR/DNI  ALLERGIES: Patient has no known allergies.    NURSING FACILITY COURSE:  Medication Changes/Rationale:   Started on PRN Seroquel for restlessness/anxiety  Amlodipine discontinued due to edema and lisinopril increased to BID for BP control   Added senna-s for constipation    Summary of nursing facility stay:  Brief Hospital Course: PMH of Dm2, HLD, HTN, depression, MS, Alzheimer's dementia who presented after a fall. Found to have left clavicular fracture. Ortho consulted and recommended non operative management, NWB javier SOTO. Was noted to have hypoxia, started on supplemental oxygen. CXR showed left sided infiltrates, received 3 days of IV Zosyn, then transitioned to oral Augmentin. Chlorthalidone held due to hypokalemia, started on Norvasc. Did have some behaviors due to his dementia while IP, responded well to a one time dose of Seroquel. When medically stable was discharged to TCU for further rehab and medical management.       Closed nondisplaced fracture of acromial end of left clavicle with routine healing, subsequent encounter  Personal history of fall  Physical deconditioning  Secondary to fall, Ortho recommended non operative management. Denies pain. Due to cognition has been removing sling at times. Had follow-up with ortho on 2/6, ok for elbow and wrist ROM, pendulum swings. Xray showed good alignment.   - analgesia with APAP PRN  - NWB to johnny SOTO through 2/14  - follow-up with ortho later today    Influenza A  Tested positive in TCU. Did require supplemental oxygen. Completed course of Tamiflu, back on RA. Resolved     Multiple sclerosis (H)  History of multiple falls. Unsteady gait. Therapy is recommending custom wheelchair for safety, being measured for this today.   [facility]     Essential hypertension  Chlorthalidone stopped and started on amlodipine while IP. Has some BLE, PCP reached out and reports previous issues with edema, is concerned amlodipine might make worse. Amlodipine discontinued and lisinopril increased to BID. BP now running 110-160  - continue lisinopril to 20mg BID   - BMP on 2/13    Moderate late onset Alzheimer's dementia with other behavioral disturbance (H)  Advanced. Is planning to move to memory care. PRN Seroquel started in TCU as had some anxiety/agitation on admission which has much improved.   - will continue PRN seroquel on discharge to [facility] memory care as will likely need while he transitions to new facility     Type 2 diabetes mellitus with right eye affected by mild nonproliferative retinopathy without macular edema, without long-term current use of insulin (H)  A1C 7.4%, BG typically 140-200,   - discontinue BG checks  - continue metformin 500mg daily      Mixed hyperlipidemia  - continue Crestor     MDD (major depressive disorder), recurrent severe, without psychosis (H)  - continue Wellbutrin     Pneumonia of left lower lobe due to infectious organism  Resolved.  Has been on RA since TCU admission. No coughing.  Completed course of Augmentin    ACP (advance care planning)  Reviewed POLST/goals of care with spouse. He currently DNR/comfort cares. She says he would not want to prolong his life given his advanced dementia. Also would not want any further surgery. Wants to avoid hospitalization.       Discharge Medications:  MED REC REQUIRED  Post Medication Reconciliation Status: medication reconcilation previously completed during another office visit    Current Outpatient Medications   Medication Sig Dispense Refill     acetaminophen (TYLENOL) 325 MG tablet Take 2 tablets (650 mg) by mouth.       albuterol (PROVENTIL) (2.5 MG/3ML) 0.083% neb solution Take 1 vial (2.5 mg) by nebulization every 4 hours as needed for shortness of breath, wheezing or cough.       buPROPion (WELLBUTRIN XL) 150 MG 24 hr tablet Take 1 tablet (150 mg) by mouth every morning. 90 tablet 1     cholecalciferol, vitamin D3, 1,000 unit (25 mcg) tablet [CHOLECALCIFEROL, VITAMIN D3, 1,000 UNIT (25 MCG) TABLET] Take 1,000 Units by mouth daily.       escitalopram (LEXAPRO) 20 MG tablet Take 1 tablet (20 mg) by mouth daily. 90 tablet 1     guaiFENesin (ROBITUSSIN) 20 mg/mL liquid Take 10 mLs (200 mg) by mouth every 4 hours as needed for cough.       lisinopril (ZESTRIL) 20 MG tablet Take 1 tablet (20 mg) by mouth 2 times daily.       melatonin 3 MG tablet Take 1 tablet (3 mg) by mouth at bedtime.       metFORMIN (GLUCOPHAGE XR) 500 MG 24 hr tablet Take 1 tablet (500 mg) by mouth daily (with dinner). 90 tablet 1     multivitamin therapeutic tablet [MULTIVITAMIN THERAPEUTIC TABLET] Take 1 tablet by mouth daily.       QUEtiapine (SEROQUEL) 25 MG tablet Take 1 tablet (25 mg) by mouth every 6 hours as needed (agitation, anxiety).       rosuvastatin (CRESTOR) 10 MG tablet TAKE 1 TABLET BY MOUTH EVERYDAY AT BEDTIME 90 tablet 3     Controlled medications:   not applicable/none     Past Medical History:   Past Medical  "History:   Diagnosis Date     Arthritis      Cancer (H) 02/2020    basal cell Moh's forehead     Diabetes mellitus (H)     type 2, diet controlled as of March 2019     Headache 2014    Due to spontaneous spinal fluid leak     Hyperlipidemia      Hypertension      Kidney stones 2019     Multiple sclerosis (H)      Osteoarthritis     bilateral hips     Pituitary microadenoma (H)      Posterior vitreous detachment      Physical Exam:   Vitals: /76   Pulse 76   Temp (!) 32.4  F (0.2  C)   Resp 18   Ht 1.727 m (5' 8\")   Wt 88.7 kg (195 lb 9.6 oz)   SpO2 92%   BMI 29.74 kg/m    BMI: Body mass index is 29.74 kg/m .  GENERAL APPEARANCE:  Alert, in no distress, cooperative,   RESP:  lungs clear to auscultation , no respiratory distress   CV:  regular rate and rhythm, no murmur, rub, or gallop, 1-2+ BLE edema  ABDOMEN:  bowel sounds normal,   M/S:   LUE in sling  NEURO:   Cn 2-12 grossly intact, generalized weakness  PSYCH:  affect and mood normal, impaired memory and judgement       SNF Labs: Recent labs in [facility] reviewed by me today.     DISCHARGE PLAN:  Follow up labs: No labs orders/due  Medical Follow Up:   Follow up with primary care provider in 2 weeks  [facility] scheduled appointments: None.   Discharge Services: Home Care: Physical Therapy, Occupational Therapy, Registered Nurse, Home Health Aide.  Discharge Instructions Verbalized to Patient at Discharge:   24-hour supervision is recommended for safety.     TOTAL DISCHARGE TIME: Greater than 30 minutes  Electronically signed by:  NICOLETTE Alvarado CNP    Documentation of Face to Face and Certification for Home Health Services    I certify that services are/were furnished while this patient was under the care of a physician and that a physician or an allowed non-physician practitioner (NPP), had a face-to-face encounter that meets the physician face-to-face encounter requirements. The encounter was in whole, or in part, related to the " primary reason for home health. The patient is confined to his/her home and needs intermittent skilled nursing, physical therapy, speech-language pathology, or the continued need for occupational therapy. A plan of care has been established by a physician and is periodically reviewed by a physician.  Date of Face-to-Face Encounter: 2/10/2025.    I certify that, based on my findings, the following services are medically necessary home health services: Nursing, Occupational Therapy, Physical Therapy, and home health aide .    My clinical findings support the need for the above skilled services because: Requires assistance of another person or specialized equipment to access medical services because patient: Is prone to wander/get lost without assistance. and Requires supervision of another for safe transfer...    Patient to re-establish plan of care with their PCP within 7-10 days after leaving the facility to reestablish care.  Medicare certified PECOS provider: NICOLETTE Alvarado CNP  Date: February 10, 2025    Dr.Yasser Haroon MD, signing F2F and only signing for initial order. Please send all follow up questions and concerns or needed follow up signatures to the PCP, who [place] has on file as:  Dez Sinclair.      Sincerely,        NICOLETTE Alvarado CNP    Electronically signed

## 2025-02-12 ENCOUNTER — LAB REQUISITION (OUTPATIENT)
Dept: LAB | Facility: CLINIC | Age: 75
End: 2025-02-12

## 2025-02-12 DIAGNOSIS — I10 ESSENTIAL (PRIMARY) HYPERTENSION: ICD-10-CM

## 2025-02-13 LAB
ANION GAP SERPL CALCULATED.3IONS-SCNC: 13 MMOL/L (ref 7–15)
BUN SERPL-MCNC: 16 MG/DL (ref 8–23)
CALCIUM SERPL-MCNC: 9.1 MG/DL (ref 8.8–10.4)
CHLORIDE SERPL-SCNC: 98 MMOL/L (ref 98–107)
CREAT SERPL-MCNC: 0.76 MG/DL (ref 0.67–1.17)
EGFRCR SERPLBLD CKD-EPI 2021: >90 ML/MIN/1.73M2
GLUCOSE SERPL-MCNC: 224 MG/DL (ref 70–99)
HCO3 SERPL-SCNC: 28 MMOL/L (ref 22–29)
POTASSIUM SERPL-SCNC: 4.6 MMOL/L (ref 3.4–5.3)
SODIUM SERPL-SCNC: 139 MMOL/L (ref 135–145)

## 2025-02-13 PROCEDURE — 80051 ELECTROLYTE PANEL: CPT | Performed by: NURSE PRACTITIONER

## 2025-02-13 PROCEDURE — 36415 COLL VENOUS BLD VENIPUNCTURE: CPT | Performed by: NURSE PRACTITIONER

## 2025-02-13 PROCEDURE — P9603 ONE-WAY ALLOW PRORATED MILES: HCPCS | Performed by: NURSE PRACTITIONER

## 2025-02-13 PROCEDURE — 82374 ASSAY BLOOD CARBON DIOXIDE: CPT | Performed by: NURSE PRACTITIONER

## 2025-02-13 PROCEDURE — 80048 BASIC METABOLIC PNL TOTAL CA: CPT | Performed by: NURSE PRACTITIONER

## 2025-02-19 ENCOUNTER — PATIENT OUTREACH (OUTPATIENT)
Dept: CARE COORDINATION | Facility: CLINIC | Age: 75
End: 2025-02-19
Payer: MEDICARE

## 2025-02-19 DIAGNOSIS — Z09 HOSPITAL DISCHARGE FOLLOW-UP: ICD-10-CM

## 2025-02-19 NOTE — Clinical Note
FYI pt discharged from TCU; transitioned to memory care - [facility] program closed

## 2025-02-19 NOTE — PROGRESS NOTES
Clinic Care Coordination Contact  Transitions of Care Outreach  Chief Complaint   Patient presents with    Clinic Care Coordination - Post Hospital       Most Recent Admission Date: 1/17/2025   Most Recent Admission Diagnosis: Hypoxia - R09.02  Closed nondisplaced fracture of acromial end of left clavicle, initial encounter - S42.035A     Most Recent Discharge Date: 1/21/2025   Most Recent Discharge Diagnosis: Hypoxia - R09.02  Closed nondisplaced fracture of acromial end of left clavicle, initial encounter - S42.035A  Pneumonia due to infectious organism, unspecified laterality, unspecified part of lung - J18.9     Transitions of Care Assessment    Discharge Assessment  How are you doing now that you are home?: spoke with pt's wife Jeniffer. She noted that pt has transitioned to memory care at this time. She has canceled all [place] appointments for pt - she is going to get pt established with primary care though  and in home services at [facility]. She will reach out to the clinic if there are any needs she as paperwork, ect or health needs that cannot be met by [facility]. She feels things are going well and pt is settled  How are your symptoms? (Red Flag symptoms escalate to triage hotline per guidelines): Improved  Do you know how to contact your clinic care team if you have future questions or changes to your health status? : Yes  Does the patient have their discharge instructions? : Yes  Does the patient have questions regarding their discharge instructions? : No  Does the patient have all of their medications?: Yes  Do you have questions regarding any of your medications? : No  Do you have all of your needed medical supplies or equipment (DME)?  (i.e. oxygen tank, CPAP, cane, etc.): Yes         Post-op (Clinicians Only)  Fever: No  Chills: No  Eating & Drinking: eating and drinking without complaints/concerns        Follow up Plan          No future appointments.    Outpatient Plan as outlined on AVS  reviewed with patient.    For any urgent concerns, please contact our 24 hour nurse triage line: [phone] ([phone])       Kathia Lara [facility]      Clinic Care Coordination Contact  Dis enrollment       Situation: Patient chart reviewed by care coordinator.    Background: Pt is enrolled in care coordination and followed to assist with goal(s) progression. [facility] completed chart review per pt's move to memory care.     Assessment: Per Chart Review:   Recent Hospitalization/ED: [facility] stay 1/17/25 to 1/21/25.  Date of SNF Admission: January 21, 2025  Date of SNF Discharge: February 13, 2025  Discharged to: new assisted living for patient [facility] memory care    Plan/Recommendations: Per Care Coordination standard work pt will be disenrolled from Care Coordination. Care Coordinator will remain available, however, will do no further outreaches at this time unless a new referral is made or a change it pt status occurs. Pt has been provided with this writer's contact information and has been encouraged to call with any questions. Jeniffer is aware that she can reach out to this [facility] for any needs or concerns.

## 2025-02-21 ENCOUNTER — MEDICAL CORRESPONDENCE (OUTPATIENT)
Dept: HEALTH INFORMATION MANAGEMENT | Facility: CLINIC | Age: 75
End: 2025-02-21

## 2025-03-01 ENCOUNTER — LAB REQUISITION (OUTPATIENT)
Dept: LAB | Facility: CLINIC | Age: 75
End: 2025-03-01
Payer: MEDICARE

## 2025-03-01 DIAGNOSIS — D69.2 OTHER NONTHROMBOCYTOPENIC PURPURA: ICD-10-CM

## 2025-03-01 DIAGNOSIS — I10 ESSENTIAL (PRIMARY) HYPERTENSION: ICD-10-CM

## 2025-03-01 DIAGNOSIS — F02.B0 DEMENTIA IN OTHER DISEASES CLASSIFIED ELSEWHERE, MODERATE, WITHOUT BEHAVIORAL DISTURBANCE, PSYCHOTIC DISTURBANCE, MOOD DISTURBANCE, AND ANXIETY (H): ICD-10-CM

## 2025-03-01 DIAGNOSIS — F03.90 UNSPECIFIED DEMENTIA, UNSPECIFIED SEVERITY, WITHOUT BEHAVIORAL DISTURBANCE, PSYCHOTIC DISTURBANCE, MOOD DISTURBANCE, AND ANXIETY (H): ICD-10-CM

## 2025-03-01 DIAGNOSIS — G30.1 ALZHEIMER'S DISEASE WITH LATE ONSET (CODE) (H): ICD-10-CM

## 2025-03-01 DIAGNOSIS — G63 POLYNEUROPATHY IN DISEASES CLASSIFIED ELSEWHERE: ICD-10-CM

## 2025-03-01 DIAGNOSIS — E11.9 TYPE 2 DIABETES MELLITUS WITHOUT COMPLICATIONS (H): ICD-10-CM

## 2025-03-01 DIAGNOSIS — F33.9 MAJOR DEPRESSIVE DISORDER, RECURRENT, UNSPECIFIED: ICD-10-CM

## 2025-03-01 DIAGNOSIS — G35 MULTIPLE SCLEROSIS (H): ICD-10-CM

## 2025-03-01 DIAGNOSIS — I73.9 PERIPHERAL VASCULAR DISEASE, UNSPECIFIED: ICD-10-CM

## 2025-03-01 DIAGNOSIS — S42.002D FRACTURE OF UNSPECIFIED PART OF LEFT CLAVICLE, SUBSEQUENT ENCOUNTER FOR FRACTURE WITH ROUTINE HEALING: ICD-10-CM

## 2025-03-06 LAB
ALBUMIN SERPL BCG-MCNC: 4.1 G/DL (ref 3.5–5.2)
ALP SERPL-CCNC: 125 U/L (ref 40–150)
ALT SERPL W P-5'-P-CCNC: 33 U/L (ref 0–70)
ANION GAP SERPL CALCULATED.3IONS-SCNC: 12 MMOL/L (ref 7–15)
AST SERPL W P-5'-P-CCNC: 33 U/L (ref 0–45)
BILIRUB SERPL-MCNC: 0.7 MG/DL
BUN SERPL-MCNC: 8.8 MG/DL (ref 8–23)
CALCIUM SERPL-MCNC: 9.9 MG/DL (ref 8.8–10.4)
CHLORIDE SERPL-SCNC: 97 MMOL/L (ref 98–107)
CREAT SERPL-MCNC: 0.75 MG/DL (ref 0.67–1.17)
EGFRCR SERPLBLD CKD-EPI 2021: >90 ML/MIN/1.73M2
ERYTHROCYTE [DISTWIDTH] IN BLOOD BY AUTOMATED COUNT: 13.4 % (ref 10–15)
EST. AVERAGE GLUCOSE BLD GHB EST-MCNC: 177 MG/DL
HBA1C MFR BLD: 7.8 %
HCO3 SERPL-SCNC: 31 MMOL/L (ref 22–29)
HCT VFR BLD AUTO: 49.8 % (ref 40–53)
HGB BLD-MCNC: 16.4 G/DL (ref 13.3–17.7)
MCH RBC QN AUTO: 29 PG (ref 26.5–33)
MCHC RBC AUTO-ENTMCNC: 32.9 G/DL (ref 31.5–36.5)
MCV RBC AUTO: 88 FL (ref 78–100)
PLATELET # BLD AUTO: 266 10E3/UL (ref 150–450)
POTASSIUM SERPL-SCNC: 3.6 MMOL/L (ref 3.4–5.3)
PROT SERPL-MCNC: 6.9 G/DL (ref 6.4–8.3)
RBC # BLD AUTO: 5.66 10E6/UL (ref 4.4–5.9)
SODIUM SERPL-SCNC: 140 MMOL/L (ref 135–145)
TSH SERPL DL<=0.005 MIU/L-ACNC: 1.69 UIU/ML (ref 0.3–4.2)
VIT B12 SERPL-MCNC: 493 PG/ML (ref 232–1245)
WBC # BLD AUTO: 5.2 10E3/UL (ref 4–11)

## 2025-03-06 PROCEDURE — P9604 ONE-WAY ALLOW PRORATED TRIP: HCPCS | Mod: ORL | Performed by: INTERNAL MEDICINE

## 2025-03-06 PROCEDURE — 84443 ASSAY THYROID STIM HORMONE: CPT | Mod: ORL | Performed by: INTERNAL MEDICINE

## 2025-03-06 PROCEDURE — 82607 VITAMIN B-12: CPT | Mod: ORL | Performed by: INTERNAL MEDICINE

## 2025-03-06 PROCEDURE — 36415 COLL VENOUS BLD VENIPUNCTURE: CPT | Mod: ORL | Performed by: INTERNAL MEDICINE

## 2025-03-06 PROCEDURE — 85027 COMPLETE CBC AUTOMATED: CPT | Mod: ORL | Performed by: INTERNAL MEDICINE

## 2025-03-06 PROCEDURE — 83036 HEMOGLOBIN GLYCOSYLATED A1C: CPT | Mod: ORL | Performed by: INTERNAL MEDICINE

## 2025-03-06 PROCEDURE — 80053 COMPREHEN METABOLIC PANEL: CPT | Mod: ORL | Performed by: INTERNAL MEDICINE

## 2025-03-07 LAB — GLUCOSE SERPL-MCNC: 177 MG/DL (ref 70–99)

## 2025-03-24 ENCOUNTER — TELEPHONE (OUTPATIENT)
Dept: FAMILY MEDICINE | Facility: CLINIC | Age: 75
End: 2025-03-24
Payer: MEDICARE

## 2025-03-24 NOTE — TELEPHONE ENCOUNTER
Home Care is calling regarding an established patient with [facility].  Requesting orders from: Dez Sinclair RN APPROVED: RN able to provide verbal orders.  Home Care will send orders for signature.  RN will close encounter.  Is this a request for a temporary pause in the home care episode?  No    Orders Requested    Occupational Therapy  Request for initial evaluation and treatment (one time)   Cognition and transfer safety recommendations.    RN gave verbal order: Yes         Eve PT with [facility] [phone]    Meme Gomes RN

## 2025-03-31 ENCOUNTER — TELEPHONE (OUTPATIENT)
Dept: FAMILY MEDICINE | Facility: CLINIC | Age: 75
End: 2025-03-31
Payer: MEDICARE

## 2025-03-31 NOTE — TELEPHONE ENCOUNTER
Home Care is calling regarding an established patient with [facility].  Requesting orders from: Dez Sinclair RN APPROVED: RN able to provide verbal orders.  Home Care will send orders for signature.  RN will close encounter.  Is this a request for a temporary pause in the home care episode?  No    Orders Requested    Occupational Therapy  Request for continuation of care with no increase or decrease in frequency  Frequency: 1x a week x 3 weeks   RN gave verbal order: Yes        Phone number Home Care can be reached at: [phone]  Okay to leave a detailed message?: Yes    Contacts       Contact Date/Time Type Contact Phone/Fax    03/31/2025 12:27 PM CDT Phone (Incoming) Shikha (Home Care) [phone]          Tal Lai RN

## 2025-04-04 ENCOUNTER — TRANSFERRED RECORDS (OUTPATIENT)
Dept: HEALTH INFORMATION MANAGEMENT | Facility: CLINIC | Age: 75
End: 2025-04-04
Payer: MEDICARE

## 2025-04-21 ENCOUNTER — TELEPHONE (OUTPATIENT)
Dept: FAMILY MEDICINE | Facility: CLINIC | Age: 75
End: 2025-04-21
Payer: MEDICARE

## 2025-04-21 NOTE — TELEPHONE ENCOUNTER
Home Care is calling regarding an established patient with [facility].  Requesting orders from: Dez Sinclair RN APPROVED: RN able to provide verbal orders.  Home Care will send orders for signature.  RN will close encounter.  Is this a request for a temporary pause in the home care episode?  No    Orders Requested    Skilled Nursing  Request for continuation of care with decrease in frequency  Frequency: Once every other week for 9 weeks  RN gave verbal order: Yes        Phone number Home Care can be reached at: [phone]  Okay to leave a detailed message?: Yes    Rin Unger RN

## 2025-04-22 ENCOUNTER — HOSPITAL ENCOUNTER (INPATIENT)
Facility: HOSPITAL | Age: 75
LOS: 2 days | Discharge: NURSING, REHAB OR LONG-TERM CARE | End: 2025-04-24
Attending: EMERGENCY MEDICINE | Admitting: STUDENT IN AN ORGANIZED HEALTH CARE EDUCATION/TRAINING PROGRAM
Payer: MEDICARE

## 2025-04-22 ENCOUNTER — APPOINTMENT (OUTPATIENT)
Dept: CT IMAGING | Facility: HOSPITAL | Age: 75
End: 2025-04-22
Attending: EMERGENCY MEDICINE
Payer: MEDICARE

## 2025-04-22 ENCOUNTER — APPOINTMENT (OUTPATIENT)
Dept: MRI IMAGING | Facility: HOSPITAL | Age: 75
End: 2025-04-22
Attending: NURSE PRACTITIONER
Payer: MEDICARE

## 2025-04-22 ENCOUNTER — APPOINTMENT (OUTPATIENT)
Dept: RADIOLOGY | Facility: HOSPITAL | Age: 75
End: 2025-04-22
Attending: EMERGENCY MEDICINE
Payer: MEDICARE

## 2025-04-22 ENCOUNTER — MEDICAL CORRESPONDENCE (OUTPATIENT)
Dept: HEALTH INFORMATION MANAGEMENT | Facility: CLINIC | Age: 75
End: 2025-04-22

## 2025-04-22 ENCOUNTER — APPOINTMENT (OUTPATIENT)
Dept: CARDIOLOGY | Facility: HOSPITAL | Age: 75
DRG: 064 | End: 2025-04-22
Attending: STUDENT IN AN ORGANIZED HEALTH CARE EDUCATION/TRAINING PROGRAM
Payer: MEDICARE

## 2025-04-22 DIAGNOSIS — U07.1 COVID-19: ICD-10-CM

## 2025-04-22 DIAGNOSIS — R41.82 ALTERED MENTAL STATUS, UNSPECIFIED ALTERED MENTAL STATUS TYPE: ICD-10-CM

## 2025-04-22 DIAGNOSIS — I63.9 ISCHEMIC STROKE (H): Primary | ICD-10-CM

## 2025-04-22 DIAGNOSIS — R09.02 HYPOXIA: ICD-10-CM

## 2025-04-22 LAB
ALBUMIN UR-MCNC: 20 MG/DL
ANION GAP SERPL CALCULATED.3IONS-SCNC: 5 MMOL/L (ref 7–15)
APPEARANCE UR: CLEAR
APTT PPP: 29 SECONDS (ref 22–38)
BASE EXCESS BLDV CALC-SCNC: 10.2 MMOL/L (ref -3–3)
BASOPHILS # BLD AUTO: 0 10E3/UL (ref 0–0.2)
BASOPHILS NFR BLD AUTO: 0 %
BILIRUB UR QL STRIP: NEGATIVE
BUN SERPL-MCNC: 9.4 MG/DL (ref 8–23)
CALCIUM SERPL-MCNC: 8.4 MG/DL (ref 8.8–10.4)
CHLORIDE SERPL-SCNC: 99 MMOL/L (ref 98–107)
CHOLEST SERPL-MCNC: 118 MG/DL
COLOR UR AUTO: ABNORMAL
CREAT SERPL-MCNC: 0.91 MG/DL (ref 0.67–1.17)
EGFRCR SERPLBLD CKD-EPI 2021: 88 ML/MIN/1.73M2
EOSINOPHIL # BLD AUTO: 0 10E3/UL (ref 0–0.7)
EOSINOPHIL NFR BLD AUTO: 1 %
ERYTHROCYTE [DISTWIDTH] IN BLOOD BY AUTOMATED COUNT: 12.7 % (ref 10–15)
EST. AVERAGE GLUCOSE BLD GHB EST-MCNC: 169 MG/DL
FLUAV RNA SPEC QL NAA+PROBE: NEGATIVE
FLUBV RNA RESP QL NAA+PROBE: NEGATIVE
GLUCOSE BLDC GLUCOMTR-MCNC: 210 MG/DL (ref 70–99)
GLUCOSE BLDC GLUCOMTR-MCNC: 248 MG/DL (ref 70–99)
GLUCOSE SERPL-MCNC: 209 MG/DL (ref 70–99)
GLUCOSE UR STRIP-MCNC: 70 MG/DL
HBA1C MFR BLD: 7.5 %
HCO3 BLDV-SCNC: 38 MMOL/L (ref 21–28)
HCO3 SERPL-SCNC: 36 MMOL/L (ref 22–29)
HCT VFR BLD AUTO: 48.5 % (ref 40–53)
HDLC SERPL-MCNC: 45 MG/DL
HGB BLD-MCNC: 16 G/DL (ref 13.3–17.7)
HGB UR QL STRIP: ABNORMAL
IMM GRANULOCYTES # BLD: 0 10E3/UL
IMM GRANULOCYTES NFR BLD: 0 %
INR PPP: 1.05 (ref 0.85–1.15)
KETONES UR STRIP-MCNC: NEGATIVE MG/DL
LACTATE SERPL-SCNC: 1.5 MMOL/L (ref 0.7–2)
LDLC SERPL CALC-MCNC: 56 MG/DL
LEUKOCYTE ESTERASE UR QL STRIP: NEGATIVE
LVEF ECHO: NORMAL
LYMPHOCYTES # BLD AUTO: 0.4 10E3/UL (ref 0.8–5.3)
LYMPHOCYTES NFR BLD AUTO: 6 %
MCH RBC QN AUTO: 29 PG (ref 26.5–33)
MCHC RBC AUTO-ENTMCNC: 33 G/DL (ref 31.5–36.5)
MCV RBC AUTO: 88 FL (ref 78–100)
MONOCYTES # BLD AUTO: 0.8 10E3/UL (ref 0–1.3)
MONOCYTES NFR BLD AUTO: 11 %
NEUTROPHILS # BLD AUTO: 5.7 10E3/UL (ref 1.6–8.3)
NEUTROPHILS NFR BLD AUTO: 82 %
NITRATE UR QL: NEGATIVE
NONHDLC SERPL-MCNC: 73 MG/DL
NRBC # BLD AUTO: 0 10E3/UL
NRBC BLD AUTO-RTO: 0 /100
NT-PROBNP SERPL-MCNC: 678 PG/ML (ref 0–900)
O2/TOTAL GAS SETTING VFR VENT: 36 %
OXYHGB MFR BLDV: 83 % (ref 70–75)
PCO2 BLDV: 58 MM HG (ref 40–50)
PH BLDV: 7.42 [PH] (ref 7.32–7.43)
PH UR STRIP: 7.5 [PH] (ref 5–7)
PLATELET # BLD AUTO: 234 10E3/UL (ref 150–450)
PO2 BLDV: 50 MM HG (ref 25–47)
POTASSIUM SERPL-SCNC: 3.9 MMOL/L (ref 3.4–5.3)
PROCALCITONIN SERPL IA-MCNC: 0.08 NG/ML
RBC # BLD AUTO: 5.51 10E6/UL (ref 4.4–5.9)
RBC URINE: 2 /HPF
RSV RNA SPEC NAA+PROBE: NEGATIVE
SAO2 % BLDV: 84.2 % (ref 70–75)
SARS-COV-2 RNA RESP QL NAA+PROBE: POSITIVE
SODIUM SERPL-SCNC: 140 MMOL/L (ref 135–145)
SP GR UR STRIP: 1.01 (ref 1–1.03)
TRIGL SERPL-MCNC: 84 MG/DL
TROPONIN T SERPL HS-MCNC: 25 NG/L
TROPONIN T SERPL HS-MCNC: 35 NG/L
TROPONIN T SERPL HS-MCNC: 38 NG/L
UROBILINOGEN UR STRIP-MCNC: NORMAL MG/DL
WBC # BLD AUTO: 7 10E3/UL (ref 4–11)
WBC URINE: 2 /HPF

## 2025-04-22 PROCEDURE — G0425 INPT/ED TELECONSULT30: HCPCS | Mod: G0 | Performed by: STUDENT IN AN ORGANIZED HEALTH CARE EDUCATION/TRAINING PROGRAM

## 2025-04-22 PROCEDURE — 258N000003 HC RX IP 258 OP 636: Performed by: EMERGENCY MEDICINE

## 2025-04-22 PROCEDURE — 272N000202 HC AEROBIKA WITH MANOMETER

## 2025-04-22 PROCEDURE — 82465 ASSAY BLD/SERUM CHOLESTEROL: CPT | Performed by: STUDENT IN AN ORGANIZED HEALTH CARE EDUCATION/TRAINING PROGRAM

## 2025-04-22 PROCEDURE — 84484 ASSAY OF TROPONIN QUANT: CPT | Performed by: STUDENT IN AN ORGANIZED HEALTH CARE EDUCATION/TRAINING PROGRAM

## 2025-04-22 PROCEDURE — 99285 EMERGENCY DEPT VISIT HI MDM: CPT | Mod: 25

## 2025-04-22 PROCEDURE — 93306 TTE W/DOPPLER COMPLETE: CPT

## 2025-04-22 PROCEDURE — 82805 BLOOD GASES W/O2 SATURATION: CPT | Performed by: EMERGENCY MEDICINE

## 2025-04-22 PROCEDURE — 250N000012 HC RX MED GY IP 250 OP 636 PS 637: Performed by: STUDENT IN AN ORGANIZED HEALTH CARE EDUCATION/TRAINING PROGRAM

## 2025-04-22 PROCEDURE — 250N000011 HC RX IP 250 OP 636: Performed by: EMERGENCY MEDICINE

## 2025-04-22 PROCEDURE — 85730 THROMBOPLASTIN TIME PARTIAL: CPT | Performed by: EMERGENCY MEDICINE

## 2025-04-22 PROCEDURE — 258N000003 HC RX IP 258 OP 636: Performed by: STUDENT IN AN ORGANIZED HEALTH CARE EDUCATION/TRAINING PROGRAM

## 2025-04-22 PROCEDURE — 250N000011 HC RX IP 250 OP 636: Performed by: STUDENT IN AN ORGANIZED HEALTH CARE EDUCATION/TRAINING PROGRAM

## 2025-04-22 PROCEDURE — 83036 HEMOGLOBIN GLYCOSYLATED A1C: CPT | Performed by: STUDENT IN AN ORGANIZED HEALTH CARE EDUCATION/TRAINING PROGRAM

## 2025-04-22 PROCEDURE — 84145 PROCALCITONIN (PCT): CPT | Performed by: EMERGENCY MEDICINE

## 2025-04-22 PROCEDURE — 94640 AIRWAY INHALATION TREATMENT: CPT

## 2025-04-22 PROCEDURE — 36415 COLL VENOUS BLD VENIPUNCTURE: CPT | Performed by: EMERGENCY MEDICINE

## 2025-04-22 PROCEDURE — 80048 BASIC METABOLIC PNL TOTAL CA: CPT | Performed by: EMERGENCY MEDICINE

## 2025-04-22 PROCEDURE — 85610 PROTHROMBIN TIME: CPT | Performed by: EMERGENCY MEDICINE

## 2025-04-22 PROCEDURE — 93306 TTE W/DOPPLER COMPLETE: CPT | Mod: 26 | Performed by: INTERNAL MEDICINE

## 2025-04-22 PROCEDURE — 70551 MRI BRAIN STEM W/O DYE: CPT

## 2025-04-22 PROCEDURE — 999N000157 HC STATISTIC RCP TIME EA 10 MIN

## 2025-04-22 PROCEDURE — 83605 ASSAY OF LACTIC ACID: CPT | Performed by: EMERGENCY MEDICINE

## 2025-04-22 PROCEDURE — 87040 BLOOD CULTURE FOR BACTERIA: CPT | Performed by: EMERGENCY MEDICINE

## 2025-04-22 PROCEDURE — 120N000001 HC R&B MED SURG/OB

## 2025-04-22 PROCEDURE — 36415 COLL VENOUS BLD VENIPUNCTURE: CPT | Performed by: STUDENT IN AN ORGANIZED HEALTH CARE EDUCATION/TRAINING PROGRAM

## 2025-04-22 PROCEDURE — 250N000013 HC RX MED GY IP 250 OP 250 PS 637: Performed by: EMERGENCY MEDICINE

## 2025-04-22 PROCEDURE — 83880 ASSAY OF NATRIURETIC PEPTIDE: CPT | Performed by: EMERGENCY MEDICINE

## 2025-04-22 PROCEDURE — 87637 SARSCOV2&INF A&B&RSV AMP PRB: CPT | Performed by: EMERGENCY MEDICINE

## 2025-04-22 PROCEDURE — 70496 CT ANGIOGRAPHY HEAD: CPT

## 2025-04-22 PROCEDURE — 84484 ASSAY OF TROPONIN QUANT: CPT | Performed by: EMERGENCY MEDICINE

## 2025-04-22 PROCEDURE — 93005 ELECTROCARDIOGRAM TRACING: CPT | Performed by: EMERGENCY MEDICINE

## 2025-04-22 PROCEDURE — 250N000009 HC RX 250: Performed by: STUDENT IN AN ORGANIZED HEALTH CARE EDUCATION/TRAINING PROGRAM

## 2025-04-22 PROCEDURE — 71045 X-RAY EXAM CHEST 1 VIEW: CPT

## 2025-04-22 PROCEDURE — 85004 AUTOMATED DIFF WBC COUNT: CPT | Performed by: EMERGENCY MEDICINE

## 2025-04-22 PROCEDURE — 99223 1ST HOSP IP/OBS HIGH 75: CPT | Mod: AI | Performed by: STUDENT IN AN ORGANIZED HEALTH CARE EDUCATION/TRAINING PROGRAM

## 2025-04-22 PROCEDURE — 999N000156 HC STATISTIC RCP CONSULT EA 30 MIN

## 2025-04-22 PROCEDURE — G0425 INPT/ED TELECONSULT30: HCPCS | Mod: G0 | Performed by: NURSE PRACTITIONER

## 2025-04-22 PROCEDURE — 250N000013 HC RX MED GY IP 250 OP 250 PS 637: Performed by: STUDENT IN AN ORGANIZED HEALTH CARE EDUCATION/TRAINING PROGRAM

## 2025-04-22 PROCEDURE — 81001 URINALYSIS AUTO W/SCOPE: CPT | Performed by: EMERGENCY MEDICINE

## 2025-04-22 RX ORDER — NICOTINE POLACRILEX 4 MG
15-30 LOZENGE BUCCAL
Status: DISCONTINUED | OUTPATIENT
Start: 2025-04-22 | End: 2025-04-24 | Stop reason: HOSPADM

## 2025-04-22 RX ORDER — AMOXICILLIN 250 MG
2 CAPSULE ORAL AT BEDTIME
Status: DISCONTINUED | OUTPATIENT
Start: 2025-04-22 | End: 2025-04-24 | Stop reason: HOSPADM

## 2025-04-22 RX ORDER — DOXYCYCLINE 100 MG/10ML
100 INJECTION, POWDER, LYOPHILIZED, FOR SOLUTION INTRAVENOUS EVERY 12 HOURS
Status: DISCONTINUED | OUTPATIENT
Start: 2025-04-22 | End: 2025-04-23

## 2025-04-22 RX ORDER — ACETAMINOPHEN 650 MG/1
650 SUPPOSITORY RECTAL EVERY 4 HOURS PRN
Status: DISCONTINUED | OUTPATIENT
Start: 2025-04-22 | End: 2025-04-24 | Stop reason: HOSPADM

## 2025-04-22 RX ORDER — DEXTROSE MONOHYDRATE 25 G/50ML
25-50 INJECTION, SOLUTION INTRAVENOUS
Status: DISCONTINUED | OUTPATIENT
Start: 2025-04-22 | End: 2025-04-24 | Stop reason: HOSPADM

## 2025-04-22 RX ORDER — ACETAMINOPHEN 325 MG/1
650 TABLET ORAL EVERY 4 HOURS PRN
Status: DISCONTINUED | OUTPATIENT
Start: 2025-04-22 | End: 2025-04-24 | Stop reason: HOSPADM

## 2025-04-22 RX ORDER — ONDANSETRON 2 MG/ML
4 INJECTION INTRAMUSCULAR; INTRAVENOUS EVERY 6 HOURS PRN
Status: DISCONTINUED | OUTPATIENT
Start: 2025-04-22 | End: 2025-04-24 | Stop reason: HOSPADM

## 2025-04-22 RX ORDER — GUAIFENESIN/DEXTROMETHORPHAN 100-10MG/5
10 SYRUP ORAL EVERY 4 HOURS PRN
Status: DISCONTINUED | OUTPATIENT
Start: 2025-04-22 | End: 2025-04-23

## 2025-04-22 RX ORDER — ASPIRIN 81 MG/1
81 TABLET ORAL DAILY
Status: DISCONTINUED | OUTPATIENT
Start: 2025-04-23 | End: 2025-04-24 | Stop reason: HOSPADM

## 2025-04-22 RX ORDER — ESCITALOPRAM OXALATE 20 MG/1
20 TABLET ORAL DAILY
Status: DISCONTINUED | OUTPATIENT
Start: 2025-04-23 | End: 2025-04-24 | Stop reason: HOSPADM

## 2025-04-22 RX ORDER — ONDANSETRON 4 MG/1
4 TABLET, ORALLY DISINTEGRATING ORAL EVERY 6 HOURS PRN
Status: DISCONTINUED | OUTPATIENT
Start: 2025-04-22 | End: 2025-04-24 | Stop reason: HOSPADM

## 2025-04-22 RX ORDER — IPRATROPIUM BROMIDE AND ALBUTEROL SULFATE 2.5; .5 MG/3ML; MG/3ML
3 SOLUTION RESPIRATORY (INHALATION)
Status: DISCONTINUED | OUTPATIENT
Start: 2025-04-22 | End: 2025-04-24 | Stop reason: HOSPADM

## 2025-04-22 RX ORDER — ROSUVASTATIN CALCIUM 10 MG/1
10 TABLET, COATED ORAL AT BEDTIME
Status: DISCONTINUED | OUTPATIENT
Start: 2025-04-22 | End: 2025-04-24 | Stop reason: HOSPADM

## 2025-04-22 RX ORDER — BUPROPION HYDROCHLORIDE 150 MG/1
150 TABLET ORAL EVERY MORNING
Status: DISCONTINUED | OUTPATIENT
Start: 2025-04-23 | End: 2025-04-24 | Stop reason: HOSPADM

## 2025-04-22 RX ORDER — ASPIRIN 300 MG/1
300 SUPPOSITORY RECTAL ONCE
Status: COMPLETED | OUTPATIENT
Start: 2025-04-22 | End: 2025-04-22

## 2025-04-22 RX ORDER — METHYLPREDNISOLONE SODIUM SUCCINATE 40 MG/ML
40 INJECTION INTRAMUSCULAR; INTRAVENOUS EVERY 6 HOURS
Status: DISCONTINUED | OUTPATIENT
Start: 2025-04-22 | End: 2025-04-23

## 2025-04-22 RX ORDER — ASPIRIN 300 MG/1
300 SUPPOSITORY RECTAL DAILY
Status: DISCONTINUED | OUTPATIENT
Start: 2025-04-23 | End: 2025-04-23

## 2025-04-22 RX ORDER — ASPIRIN 81 MG/1
81 TABLET, CHEWABLE ORAL DAILY
Status: DISCONTINUED | OUTPATIENT
Start: 2025-04-23 | End: 2025-04-24 | Stop reason: HOSPADM

## 2025-04-22 RX ORDER — LIDOCAINE 40 MG/G
CREAM TOPICAL
Status: DISCONTINUED | OUTPATIENT
Start: 2025-04-22 | End: 2025-04-24 | Stop reason: HOSPADM

## 2025-04-22 RX ORDER — QUETIAPINE FUMARATE 25 MG/1
25 TABLET, FILM COATED ORAL EVERY 6 HOURS PRN
Status: DISCONTINUED | OUTPATIENT
Start: 2025-04-22 | End: 2025-04-24 | Stop reason: HOSPADM

## 2025-04-22 RX ORDER — IOPAMIDOL 755 MG/ML
67 INJECTION, SOLUTION INTRAVASCULAR ONCE
Status: COMPLETED | OUTPATIENT
Start: 2025-04-22 | End: 2025-04-22

## 2025-04-22 RX ORDER — BENZONATATE 100 MG/1
100 CAPSULE ORAL 3 TIMES DAILY PRN
Status: DISCONTINUED | OUTPATIENT
Start: 2025-04-22 | End: 2025-04-24 | Stop reason: HOSPADM

## 2025-04-22 RX ORDER — SODIUM CHLORIDE 9 MG/ML
INJECTION, SOLUTION INTRAVENOUS CONTINUOUS
Status: DISCONTINUED | OUTPATIENT
Start: 2025-04-22 | End: 2025-04-23

## 2025-04-22 RX ORDER — ACETAMINOPHEN 325 MG/10.15ML
650 LIQUID ORAL EVERY 4 HOURS PRN
Status: DISCONTINUED | OUTPATIENT
Start: 2025-04-22 | End: 2025-04-24 | Stop reason: HOSPADM

## 2025-04-22 RX ORDER — GUAIFENESIN 200 MG/10ML
200 LIQUID ORAL EVERY 4 HOURS PRN
Status: DISCONTINUED | OUTPATIENT
Start: 2025-04-22 | End: 2025-04-22

## 2025-04-22 RX ORDER — LISINOPRIL 20 MG/1
20 TABLET ORAL 2 TIMES DAILY
Status: DISCONTINUED | OUTPATIENT
Start: 2025-04-22 | End: 2025-04-24 | Stop reason: HOSPADM

## 2025-04-22 RX ADMIN — SODIUM CHLORIDE 500 ML: 0.9 INJECTION, SOLUTION INTRAVENOUS at 13:26

## 2025-04-22 RX ADMIN — IOPAMIDOL 67 ML: 755 INJECTION, SOLUTION INTRAVENOUS at 11:09

## 2025-04-22 RX ADMIN — ASPIRIN 300 MG: 300 SUPPOSITORY RECTAL at 13:55

## 2025-04-22 RX ADMIN — ACETAMINOPHEN 650 MG: 650 SUPPOSITORY RECTAL at 15:40

## 2025-04-22 RX ADMIN — IPRATROPIUM BROMIDE AND ALBUTEROL SULFATE 3 ML: .5; 3 SOLUTION RESPIRATORY (INHALATION) at 21:29

## 2025-04-22 RX ADMIN — DOXYCYCLINE 100 MG: 100 INJECTION, POWDER, LYOPHILIZED, FOR SOLUTION INTRAVENOUS at 15:42

## 2025-04-22 RX ADMIN — IPRATROPIUM BROMIDE AND ALBUTEROL SULFATE 3 ML: .5; 3 SOLUTION RESPIRATORY (INHALATION) at 15:33

## 2025-04-22 RX ADMIN — SODIUM CHLORIDE: 0.9 INJECTION, SOLUTION INTRAVENOUS at 15:23

## 2025-04-22 RX ADMIN — INSULIN ASPART 2 UNITS: 100 INJECTION, SOLUTION INTRAVENOUS; SUBCUTANEOUS at 17:57

## 2025-04-22 RX ADMIN — METHYLPREDNISOLONE SODIUM SUCCINATE 40 MG: 40 INJECTION INTRAMUSCULAR; INTRAVENOUS at 22:02

## 2025-04-22 RX ADMIN — METHYLPREDNISOLONE SODIUM SUCCINATE 40 MG: 40 INJECTION INTRAMUSCULAR; INTRAVENOUS at 15:41

## 2025-04-22 ASSESSMENT — ACTIVITIES OF DAILY LIVING (ADL)
ADLS_ACUITY_SCORE: 67
ADLS_ACUITY_SCORE: 63
ADLS_ACUITY_SCORE: 67
ADLS_ACUITY_SCORE: 63
ADLS_ACUITY_SCORE: 67
ADLS_ACUITY_SCORE: 63
ADLS_ACUITY_SCORE: 63
ADLS_ACUITY_SCORE: 71
ADLS_ACUITY_SCORE: 67

## 2025-04-22 NOTE — CONSULTS
"[facility]     Stroke Code Note          History of Present Illness     Chief Complaint: Stroke Symptoms      Iraj De Leon is a 74 year old right-handed male who was brought in by EMS from a skilled nursing facility with decreased responsiveness. He has dementia and MS and, up until the last couple of months, was able to ambulate with a walker. He had a fall a couple of months ago and has been wheelchair bound since then and his wife notes that both legs have been weak since his fall. He is typically able to hold a simple conversation. His nursing home notes that he does typically lean to his left when in his wheelchair. He was last at his baseline last night at bedtime. When they woke him up this morning around 0800, he was less responsive. He was only saying \"yep\" and was unable to follow commands.   On exam, he is moving less briskly on the right side, seems to have a sensory deficit as well. He is not following commands and is only verbalizing \"yep\".          Past Medical History     Stroke risk factors: DM2, HTN, PVD, HLD    Preadmission antithrombotic regimen: none    Modified Vallejo Score (Pre-morbid)  4 - Moderately severe disability.  Unable to attend to own bodily needs without assistance or unable to walk unassisted.                   Assessment and Plan       1.  Decreased responsiveness, right sided weakness and sensory deficit, mixed aphasia   2.  COVID +     Intravenous Thrombolysis  Not given due to:   - established stroke on MRI  - unclear or unfavorable risk-benefit profile for extended window thrombolysis beyond the conventional 4.5 hour time window     Endovascular Treatment  Not initiated due to absence of proximal vessel occlusion     Plan:  - Use orderset: \"Ischemic Stroke Routine Admission\" or \"Ischemic Stroke No Thrombolytics/No Thrombectomy ICU Admission\"  - Place Neurology IP Stroke Consult order   - Neurochecks and Vital Signs every 4 hours    - Permissive " "HTN; goal SBP < 220 mmHg for 24 hrs after symptom onset and then gradual reduction to normotension   - aspirin 300 mg FL daily; once cleared by SLP for PO intake, switch to 81 mg daily   - continue PTA rosuvastatin 10 mg daily when cleared for PO intake  - TTE (with Bubble Study if age 60 yrs or less)  - Telemetry, EKG  - Bedside Glucose Monitoring  - Nutrition: NPO until cleared by SLP  - A1c, Lipid Panel, Troponin x 3  - PT/OT/SLP  - Stroke Education  - Depression Screen  - Apnea screening questions  - Euthermia, Euglycemia     ___________________________________________________________________    Bernadine Pate, NP  Vascular Neurology    To page me or covering stroke neurology team member, click here: [facility]  Choose \"On Call\" tab at top, then select \"NEUROLOGY/ALL SITES\" from middle drop-down box, press Enter, then look for \"stroke\" or \"telestroke\" for your site.  ___________________________________________________________________        Imaging/Labs   (personally reviewed CT, CTA, MRI)    CT head: no acute pathology; ventriculomegaly, temporal lobe volume loss, moderate CSVID  CTA head/neck: no LVO or high grade stenosis  Hyperacute MRI: probable left frontal lobe infarct, exam severely motion degraded         Physical Examination     BP: 105/65 (in MRI)   Pulse: 105 (in MRI)   Resp: 20   Temp: 99.8  F (37.7  C)   Temp src: Axillary   SpO2: 95 %   O2 Device: Nasal cannula   Weight: 90.7 kg (200 lb)    Wt Readings from Last 2 Encounters:   04/22/25 90.7 kg (200 lb)   02/10/25 88.7 kg (195 lb 9.6 oz)       Neuro Exam  Mental Status:  alert, not answering orientation questions, not following commands, speech output limited to \"yep\"  Cranial Nerves:  visual field exam difficult but no obvious field cut (tested by nurse), EOMI with normal smooth pursuit, mild left sided facial asymmetry, hearing not formally tested but intact to conversation   Motor:  no abnormal movements, no movement noted in RUE, spontaneous " antigravity movement in LUE, moves ankles and toes to noxious stimulus bilaterally but no antigravity movement noted in either leg  Reflexes:  unable to test (telestroke)  Sensory:   grimaces to noxious stimulus in RUE, withdraws in LUE, wiggles ankles/toes to noxious stim in BLE  Coordination:   unable to follow commands to test  Station/Gait:   unable to test, wheelchair bound at baseline        Stroke Scales       NIHSS  1a. Level of Consciousness 0-->Alert, keenly responsive   1b. LOC Questions 2-->Answers neither question correctly   1c. LOC Commands 2-->Performs neither task correctly   2.   Best Gaze 0-->Normal   3.   Visual 0-->No visual loss   4.   Facial Palsy 1-->Minor paralysis (flattened nasolabial fold, asymmetry on smiling)   5a. Motor Arm, Left 2-->Some effort against gravity, limb cannot get to or maintain (if cued) 90 (or 45) degrees, drifts down to bed, but has some effort against gravity   5b. Motor Arm, Right 4-->No movement   6a. Motor Leg, Left 3-->No effort against gravity, leg falls to bed immediately   6b. Motor Leg, right 4-->No movement   7.   Limb Ataxia 0-->Absent   8.   Sensory 1-->Mild-to-moderate sensory loss, patient feels pinprick is less sharp or is dull on the affected side, or there is a loss of superficial pain with pinprick, but patient is aware of being touched   9.   Best Language 3-->Mute, global aphasia, no usable speech or auditory comprehension   10. Dysarthria (UN) Intubated or other physical barrier   11. Extinction and Inattention  0-->No abnormality   Total 22 (04/22/25 1113)            Labs     CBC  Lab Results   Component Value Date    HGB 16.0 04/22/2025    HCT 48.5 04/22/2025    WBC 7.0 04/22/2025     04/22/2025       BMP  Lab Results   Component Value Date     04/22/2025    POTASSIUM 3.9 04/22/2025    CHLORIDE 99 04/22/2025    CO2 36 (H) 04/22/2025    BUN 9.4 04/22/2025    CR 0.91 04/22/2025     (H) 04/22/2025    MIHIR 8.4 (L) 04/22/2025  "      INR  INR   Date Value Ref Range Status   04/22/2025 1.05 0.85 - 1.15 Final   03/16/2020 0.97 0.90 - 1.10 Final   10/22/2019 1.11 (H) 0.90 - 1.10 Final       Data   Stroke Code Data  (for stroke code with tele)  Stroke code activated 04/22/25  1058   First stroke provider response 04/22/25  1100   Video start time 04/22/25  1110   Video end time 04/22/25  1137   Last known normal 04/21/25   (\"bedtime\")   Time of discovery (or onset of symptoms)  04/22/25  0800   Head CT read by Stroke Neuro Provider 04/22/25  1108   Was stroke code de-escalated? Yes  04/22/25  1252     Telestroke Service Details  Type of service telemedicine diagnostic assessment of acute neurological changes   Reason telemedicine is appropriate patient requires assessment with a specialist for diagnosis and treatment of neurological symptoms   Mode of transmission secure interactive audio and video communication per [facility] site (patient location) [facility]    Distant site (provider location) [facility]        Clinically Significant Risk Factors Present on Admission                   # Hypertension: Noted on problem list     # Dementia: noted on problem list      # DMII: A1C = 7.8 % (Ref range: <5.7 %) within past 6 months    # Overweight: Estimated body mass index is 29.53 kg/m  as calculated from the following:    Height as of this encounter: 1.753 m (5' 9\").    Weight as of this encounter: 90.7 kg (200 lb).         # Financial/Environmental Concerns:          Time Spent on this Encounter   Billing: I personally examined and evaluated the patient today. At the time of my evaluation and management the patient was critical condition today due to acute neurological symptoms. I personally managed review of labs/images, neuro exam. Key decisions made today included admit for work up. I spent a total of 70 minutes providing critical care services, evaluating the patient, " directing care and reviewing laboratory values and radiologic reports.

## 2025-04-22 NOTE — ED NOTES
Bed: JNED-18  Expected date:   Expected time:   Means of arrival:   Comments:  [facility] - 74M LKW 0800, Slow speech, Facial droop

## 2025-04-22 NOTE — ED PROVIDER NOTES
Emergency Department Encounter     Evaluation Date & Time:   4/22/2025 10:55 AM    CHIEF COMPLAINT:  Stroke Symptoms      Triage Note:       ED COURSE & MEDICAL DECISION MAKING:     Pt brought in by EMS from nursing facility.  All history from EMS as pt currently not answering questions.  Per EMS and staff at facility, pt was normal self this AM around 0800, shortly after stopped speaking and no longer moving/interacting.  Pt has a history of MS and is bound to wheelchair, but typically gets around on his own using arms to power wheelchair.  EMS felt he was possibly more weak on right, although exam here is limited to my quick evaluation as they enter from ambulance bay.  Limited, minimal response and leaving to left in gurney. Given history, reported abrupt neuro change and within window, will proceed with Tier 1 stroke code.    ED Course as of 04/22/25 1458   Tue Apr 22, 2025   1056 I met with the patient, obtained history via EMS at charge nurse desk.   1110 CT and CTA neg for acute pathology per radiology.     1120 EMS also now telling us covid is running around in nursing facility.   1148 Stroke neurology evaluated pt.  Hyperacute MRI ordered, will keep stroke code in process. Difficult exam.  They spoke with additional people at facility and pt was likely abnormal when he woke up, contradictory to initial information.      Wife also reported that pt recently had flu A and covid a few months ago.   1215 Lactate reassuring at 1.5.  hsTrop 35, delta ordered. CBC and chemistry overall reassuring.   1241 Covid +.     1248 Stroke neuro reviewed MRI and current results. Recommends aspirin 300mg rectally daily until cleared by speech.  Wife reports difficulty eating recently, so will not give PO at this point.  No plavix or other intervention.  Ok to de-escalate.   1251 Re-checked and re-evaluated patient at bedside.  Discussed results with wife and friend, who are here now.  Pt remains similar to previous. Wife  confirms he is DNR/DNI and does not desire to continue cares if he's unable to care for himself in the future.  It sounds like he would move to comfort cares if it was felt he's not improving or worsening, but this has not been made official. Either way, he and wife do not want aggressive or heroic interventions.    Wife confirms he had Flu A and covid in January, although review of EMR does not show this and testing from 1/17/25 was negative for all of that.   1337 VBG pH reassuring.     1350 CXR (independent interpretation): no acute cardiopulmonary process    1420 Repeat trop negative at 38.   1451 Wife confirms pt is DNR/DNI by my discussion with her, order placed.   1452 I spoke with Dr. Gondal, who accepts for hospitalization to neuro tele inpatient.         Medical Decision Making  I obtained history from Family Member/Significant Other and EMS  Care impacted by Cerebrovascular Disease and Dementia  Admit.    MIPS (CTPE, Dental pain, Acosta, Sinusitis, Asthma/COPD, Head Trauma): Not Applicable    SEPSIS: None          MEDICATIONS GIVEN IN THE EMERGENCY DEPARTMENT:  Medications   iopamidol (ISOVUE-370) solution 67 mL (67 mLs Intravenous $Given 4/22/25 1109)     And   sodium chloride 0.9 % bag for CT scan flush (100 mLs As instructed Not Given 4/22/25 1200)   sodium chloride 0.9% BOLUS 500 mL (0 mLs Intravenous Stopped 4/22/25 1355)   aspirin (ASA) Suppository 300 mg (300 mg Rectal $Given 4/22/25 1355)       NEW PRESCRIPTIONS STARTED AT TODAY'S ED VISIT:  New Prescriptions    No medications on file       HPI   HPI     Iraj De Leon is a 74 year old male with a pertinent history of dementia, type 2 diabetes mellitus, hypertension, multiple sclerosis, alzheimer's disease, senile dementia, paresis of lower extremity, who presents to this ED via EMS from care facility for evaluation of altered mental status, right sided weakness.    Per EMS:  At baseline patient is wheelchair bound with the ability to move himself  "around. At baseline he is interactive and talkative with care facility staff. Patient was last seen well by his care facility staff earlier today (04/22/2025) at 0800 (~3 hours ago). Sometime shortly after, he began to be less responsive and verbal to staff \" with only short quick responses\", EMS was called. EMS found potential right upper extremity weakness. En route he was sinus tachycardic in the 110s and had a pressure of 180s/90. Staff reports COVID-19 is currently going around the facility.      REVIEW OF SYSTEMS:  Limited due to dementia and altered mental status.      Medical History     Past Medical History:   Diagnosis Date    Arthritis     Cancer (H) 02/2020    Diabetes mellitus (H)     Headache 2014    Hyperlipidemia     Hypertension     Kidney stones 2019    Multiple sclerosis (H)     Osteoarthritis     Pituitary microadenoma (H)     Posterior vitreous detachment        Past Surgical History:   Procedure Laterality Date    BASAL CELL CARCINOMA EXCISION  02/20/2020    Moh's procedure to forehead    CHOLECYSTECTOMY      COLONOSCOPY      EYE SURGERY Right     cataract    IR LUMBAR PUNCTURE  3/28/2014    JOINT REPLACEMENT  10/2019    RTHA    LITHOTRIPSY  2019    OTHER SURGICAL HISTORY  2009    lipoma removalforehead    OTHER SURGICAL HISTORY      spinal fluid leak    TX CYSTO/URETERO W/LITHOTRIPSY &INDWELL STENT INSRT Right 3/27/2019    Procedure: CYSTOSCOPY RIGHT RETROGRADE PYELOGRAM, RIGHT URETEROSCOPY WITH LASER  LITHOTRIPSY STONE EXTRACTION AND RIGHT STENT EXCHANGE;  Surgeon: Zia Coyle MD;  Location: [facility] OR;  Service: Urology    [facility] TOTAL HIP ARTHROPLASTY Right 10/21/2019    Procedure: RIGHT TOTAL HIP ARTHROPLASTY;  Surgeon: Conrado Gomez MD;  Location: [facility] OR;  Service: Orthopedics    [facility] TOTAL HIP ARTHROPLASTY Left 3/16/2020    Procedure: LEFT TOTAL HIP ARTHROPLASTY;  Surgeon: Conrado Gomez MD;  Location: [facility] OR;  Service: Orthopedics       No " "family history on file.    Social History     Tobacco Use    Smoking status: Former     Current packs/day: 0.00     Types: Cigarettes     Quit date: 1982     Years since quittin.3    Smokeless tobacco: Never    Tobacco comments:     cigars, very occasional, maybe 1 x week   Substance Use Topics    Alcohol use: Not Currently    Drug use: No       acetaminophen (TYLENOL) 325 MG tablet  buPROPion (WELLBUTRIN XL) 150 MG 24 hr tablet  cholecalciferol, vitamin D3, 1,000 unit (25 mcg) tablet  escitalopram (LEXAPRO) 20 MG tablet  guaiFENesin (ROBITUSSIN) 20 mg/mL liquid  lisinopril (ZESTRIL) 20 MG tablet  melatonin 3 MG tablet  metFORMIN (GLUCOPHAGE XR) 500 MG 24 hr tablet  multivitamin therapeutic tablet  QUEtiapine (SEROQUEL) 25 MG tablet  rosuvastatin (CRESTOR) 10 MG tablet  SENNA-docusate sodium (SENNA S) 8.6-50 MG tablet        Physical Exam     Vitals:  BP (!) 169/91   Pulse 104   Temp 98.2  F (36.8  C) (Oral)   Resp 29   Ht 1.753 m (5' 9\")   Wt 90.7 kg (200 lb)   SpO2 96%   BMI 29.53 kg/m      PHYSICAL EXAM:   Physical Exam  Vitals and nursing note reviewed.   Constitutional:       General: He is in acute distress.      Appearance: Normal appearance.      Comments: Lethargic, slow to respond. Chronically ill appearing   HENT:      Head: Normocephalic and atraumatic.      Mouth/Throat:      Mouth: Mucous membranes are moist.   Eyes:      Extraocular Movements: Extraocular movements intact.      Pupils: Pupils are equal, round, and reactive to light.      Comments: No vertical or bidirectional nystagmus   Cardiovascular:      Rate and Rhythm: Regular rhythm. Tachycardia present.   Pulmonary:      Effort: Pulmonary effort is normal. No respiratory distress.      Breath sounds: Normal breath sounds.   Abdominal:      General: There is no distension.      Palpations: Abdomen is soft.      Tenderness: There is no abdominal tenderness.   Musculoskeletal:         General: Normal range of motion.      Cervical " back: Normal range of motion.   Skin:     General: Skin is warm.      Capillary Refill: Capillary refill takes less than 2 seconds.   Neurological:      Comments: Minimal speech, weak to b/l UE.  Limited exam, but no focal lateralizing weakness   Psychiatric:      Comments: Minimally verbal           [employer] Stroke Scale  Exam Interval: Baseline   Score    Level of consciousness: (1)   Not alert; arousable w/ minor stim to obey/answer/respond    LOC questions: (2)   Answers neither question correctly    LOC commands: (2)   Performs neither task correctly    Best gaze: (0)   Normal    Visual: (0)   No visual loss    Facial palsy: (0)   Normal symmetrical movements    Motor arm (left): (1)   Drift    Motor arm (right): (1)   Drift    Motor leg (left): (0)   No drift    Motor leg (right): (0)   No drift    Limb ataxia: (2)   Present in two limbs    Sensory: (0)   Normal- no sensory loss    Best language: (2)   Severe aphasia    Dysarthria: (1)   Mild to moderate dysarthria    Extinction and inattention: (0)   No abnormality        Total Score:  12   Not a TNK candidate as pt outside window      Results     LAB:  All pertinent labs reviewed and interpreted  Labs Ordered and Resulted from Time of ED Arrival to Time of ED Departure   BASIC METABOLIC PANEL - Abnormal       Result Value    Sodium 140      Potassium 3.9      Chloride 99      Carbon Dioxide (CO2) 36 (*)     Anion Gap 5 (*)     Urea Nitrogen 9.4      Creatinine 0.91      GFR Estimate 88      Calcium 8.4 (*)     Glucose 209 (*)    TROPONIN T, HIGH SENSITIVITY - Abnormal    Troponin T, High Sensitivity 35 (*)    ROUTINE UA WITH MICROSCOPIC REFLEX TO CULTURE - Abnormal    Color Urine Light Yellow      Appearance Urine Clear      Glucose Urine 70 (*)     Bilirubin Urine Negative      Ketones Urine Negative      Specific Gravity Urine 1.010      Blood Urine 0.06 mg/dL (*)     pH Urine 7.5 (*)     Protein Albumin Urine 20 (*)     Urobilinogen  Urine Normal      Nitrite Urine Negative      Leukocyte Esterase Urine Negative      RBC Urine 2      WBC Urine 2     CBC WITH PLATELETS AND DIFFERENTIAL - Abnormal    WBC Count 7.0      RBC Count 5.51      Hemoglobin 16.0      Hematocrit 48.5      MCV 88      MCH 29.0      MCHC 33.0      RDW 12.7      Platelet Count 234      % Neutrophils 82      % Lymphocytes 6      % Monocytes 11      % Eosinophils 1      % Basophils 0      % Immature Granulocytes 0      NRBCs per 100 WBC 0      Absolute Neutrophils 5.7      Absolute Lymphocytes 0.4 (*)     Absolute Monocytes 0.8      Absolute Eosinophils 0.0      Absolute Basophils 0.0      Absolute Immature Granulocytes 0.0      Absolute NRBCs 0.0     INFLUENZA A/B, RSV AND SARS-COV2 PCR - Abnormal    Influenza A PCR Negative      Influenza B PCR Negative      RSV PCR Negative      SARS CoV2 PCR Positive (*)    TROPONIN T, HIGH SENSITIVITY - Abnormal    Troponin T, High Sensitivity 38 (*)    BLOOD GAS VENOUS - Abnormal    pH Venous 7.42      pCO2 Venous 58 (*)     pO2 Venous 50 (*)     Bicarbonate Venous 38 (*)     Base Excess/Deficit Venous 10.2 (*)     FIO2 36      Oxyhemoglobin Venous 83 (*)     O2 Sat, Venous 84.2 (*)    INR - Normal    INR 1.05     PARTIAL THROMBOPLASTIN TIME - Normal    aPTT 29     PROCALCITONIN - Normal    Procalcitonin 0.08     LACTIC ACID WHOLE BLOOD WITH 1X REPEAT IN 2 HR WHEN >2 - Normal    Lactic Acid, Initial 1.5     N TERMINAL PRO BNP OUTPATIENT - Normal    N Terminal Pro BNP Outpatient [id number]     GLUCOSE MONITOR NURSING POCT   BLOOD CULTURE   BLOOD CULTURE       RADIOLOGY:  XR Chest Port 1 View   Final Result   IMPRESSION: Negative chest.      MR Brain w/o Contrast   Final Result   IMPRESSION: Severely motion degraded, borderline nondiagnostic exam. Within the aforementioned limitations possible region of punctate ischemia in the left anterior frontal lobe subcortical white matter. No sizable intracranial mass, hemorrhage or    evidence of  hydrocephalus.      CTA Head Neck with Contrast   Final Result   IMPRESSION:    HEAD CT:   1.  No acute intracranial hemorrhage. Chronic changes as detailed above. Lobar specific temporal lobe volume loss.      HEAD CTA:    1.  No large vessel occlusion.      NECK CTA:   1.  No hemodynamically significant stenosis in the neck.   2.  Case discussed with Dr. Calix at 11:08 AM CST.                   ECG:  Sinus tach, rate 105, normal intervals, no acute ischemia    I have independently reviewed and interpreted the EKG(s) documented above     PROCEDURES:  Procedures:  none      FINAL IMPRESSION:    ICD-10-CM    1. Ischemic stroke (H)  I63.9       2. COVID-19  U07.1       3. Hypoxia  R09.02       4. Altered mental status, unspecified altered mental status type  R41.82           0 minutes of critical care time      I, Moises Kendall, am serving as a scribe to document services personally performed by Dr. Tashi Calix, based on my observations and the provider's statements to me. I, Tashi Calix, DO attest that Moises Kendall is acting in a scribe capacity, has observed my performance of the services and has documented them in accordance with my direction.      Tashi Calix DO  Emergency Medicine  [facility] EMERGENCY DEPARTMENT  4/22/2025  10:56 AM         Tashi Calix MD  04/22/25 [id number]

## 2025-04-22 NOTE — CONSULTS
Care Management Initial Consult    General Information  Assessment completed with: Spouse or significant other, Jeniffer De Leon (wife)  Type of CM/SW Visit: Initial Assessment    Primary Care Provider verified and updated as needed: Yes (updated to Dr. Areli Sepulveda per wife's request)   Readmission within the last 30 days: no previous admission in last 30 days      Reason for Consult: discharge planning  Advance Care Planning: Advance Care Planning Reviewed: present on chart    Active health care agent is his wife, Jeniffer De Leon       Communication Assessment  Patient's communication style: spoken language (English or Bilingual)             Cognitive  Cognitive/Neuro/Behavioral: did not assess. Reviewed chart, per documentation - Iraj currently limited in his ability to communicate with others.     Living Environment:   People in home: facility resident     Current living Arrangements: assisted living (Memory Care Faciliy)    Name of Facility: [facility] in [place]   Able to return to prior arrangements: other (see comments) (TBD)  Living Arrangement Comments: Currently living at [facility] in [place] - has been there for the last two months. There is now a Memory Care bed available at the [facility] where his wife Jeniffer Lives - [facility]. He has 30 days left at [facility] and will transition to Memory Care at [facility] whenever the room is ready.    Family/Social Support:  Care provided by: other (see comments) (facility staff)  Provides care for: no one, unable/limited ability to care for self  Marital Status:   Support system: Wife  Jeniffer       Description of Support System: Supportive, Involved         Current Resources:   Patient receiving home care services: Yes  Skilled Home Care Services: Physical Therapy (with [facility])     Community Resources: None  Equipment currently used at home: hospital bed, [facility]  shower chair, wheelchair, manual  Supplies currently used at home: Incontinence Supplies    Employment/Financial:  Employment Status: retired, , previous service   -- [employer]  Emergency Care Report form Filed on 04/22/25 5:30 PM  Your notification ID is:  [id number]     Financial Concerns: none           Does the patient's insurance plan have a 3 day qualifying hospital stay waiver?  Yes     Which insurance plan 3 day waiver is available? [employer]    Will the waiver be used for post-acute placement? Undetermined at this time    Lifestyle & Psychosocial Needs:  Social Drivers of Health     Food Insecurity: Low Risk  (1/17/2025)    Food Insecurity     Within the past 12 months, did you worry that your food would run out before you got money to buy more?: No     Within the past 12 months, did the food you bought just not last and you didn t have money to get more?: No   Depression: Not at risk (1/16/2025)    PHQ-2     PHQ-2 Score: 0   Housing Stability: Low Risk  (1/17/2025)    Housing Stability     Do you have housing? : Yes     Are you worried about losing your housing?: No   Tobacco Use: Medium Risk (1/17/2025)    Patient History     Smoking Tobacco Use: Former     Smokeless Tobacco Use: Never     Passive Exposure: Not on file   Financial Resource Strain: Low Risk  (1/17/2025)    Financial Resource Strain     Within the past 12 months, have you or your family members you live with been unable to get utilities (heat, electricity) when it was really needed?: No   Alcohol Use: Not At Risk (10/26/2024)    AUDIT-C     Frequency of Alcohol Consumption: Never     Average Number of Drinks: Patient does not drink     Frequency of Binge Drinking: Never   Transportation Needs: Low Risk  (1/17/2025)    Transportation Needs     Within the past 12 months, has lack of transportation kept you from medical appointments, getting your medicines, non-medical meetings or appointments, work, or from getting things that  "you need?: No   Physical Activity: Inactive (1/13/2025)    Exercise Vital Sign     Days of Exercise per Week: 0 days     Minutes of Exercise per Session: 0 min   Interpersonal Safety: Low Risk  (1/17/2025)    Interpersonal Safety     Do you feel physically and emotionally safe where you currently live?: Yes     Within the past 12 months, have you been hit, slapped, kicked or otherwise physically hurt by someone?: No     Within the past 12 months, have you been humiliated or emotionally abused in other ways by your partner or ex-partner?: No   Stress: Stress Concern Present (1/13/2025)    [employer] - Occupational Stress Questionnaire     Feeling of Stress : To some extent   Social Connections: Moderately Isolated (1/13/2025)    Social Connection and Isolation Panel [NHANES]     Frequency of Communication with Friends and Family: Never     Frequency of Social Gatherings with Friends and Family: Never     Attends Islam Services: Never     Active Member of Clubs or Organizations: Yes     Attends Club or Organization Meetings: Never     Marital Status:    Health Literacy: Not on file       Functional Status:  Prior to admission patient needed assistance:   Dependent ADLs:: Bathing, Dressing, Grooming, Incontinence, Positioning, Transfers, Wheelchair-with assist, Toileting  Dependent IADLs:: Cleaning, Laundry, Cooking, Meal Preparation, Shopping, Medication Management, Money Management, Transportation, Incontinence  Assesssment of Functional Status: Not at  functional baseline    Mental Health Status:  Mental Health Status: No Current Concerns       Chemical Dependency Status:  Chemical Dependency Status: No Current Concerns             Values/Beliefs:  Spiritual, Cultural Beliefs, Islam Practices, Values that affect care: no          Values/Beliefs Comment: \"he has no Sabianist\"    Discussed  Partnership in Safe Discharge Planning  document with patient/family: " "No    Additional Information:  Reviewed chart, per documentation - Iraj is currently limited in his ability to communicate with others. ACP documents reviewed and he does have a Health Care Agent on file - his wife, Jeniffer De Leon. I called and spoke with Jeniffer to complete this assessment.     Prior to this admission, Iraj was living at [facility] in [place] - this is a temporary location for him. He was recently up in [place] at [facility] and when he needed to leave there, there was not a memory care bed available at Jeniffer's [facility]. Now there is so she plans to transition Iraj to [facility] once the room is ready. They have the next 30 days to make that transition.     At baseline, Iraj was requiring a dani lift to transfer from bed to wheelchair. He can feed himself but Jeniffer says, \"last night I had to do it for him.\" With the exception of eating, he is dependent in his ADLs. He is dependent in his IADLs.     Jeniffer says she believes he is getting physical therapy through [facility] - message sent to inquire.     Next Steps: CM to follow along. PT, OT, SLP all consulted. Inpatient Rehab liaison consulted. CM will follow and assist w/ discharge planning.         Kandi Campos RN  [facility] ED Care Manager  Desk Phone #: [phone]  Cell #: [phone]      "

## 2025-04-22 NOTE — PROGRESS NOTES
Responded to code stroke PT in [place]. On 5L NC.  He is able to protect his airway at this time.    Chris Hill, RT  4/22/2025

## 2025-04-22 NOTE — Clinical Note
[facility]  [address] [address]  Phone: [phone]  Fax: [phone]    April 24, 2025        Iraj De Leon  [address] [place] [id number]          To whom it may concern:    RE: Iraj De Leon    {[id number]}    Please contact me for questions or concerns.      Sincerely,      Genevieve Wisdom MD

## 2025-04-22 NOTE — MEDICATION SCRIBE - ADMISSION MEDICATION HISTORY
Medication Scribe Admission Medication History    Admission medication history is complete. The information provided in this note is only as accurate as the sources available at the time of the update.    Information Source(s): Facility ([facility]/) medication list/MAR via phone    Pertinent Information: medication is managed by [facility] ([phone]). Staff reported last medication taken was yesterday evening except lisinopril he took it today morning.     Changes made to PTA medication list:  Added: None  Deleted: None  Changed: None    Allergies reviewed with patient and updates made in EHR: yes    Medication History Completed By: Aklilu Gebreyesus 4/22/2025 11:40 AM    PTA Med List   Medication Sig Last Dose/Taking    acetaminophen (TYLENOL) 325 MG tablet Take 650 mg by mouth every 4 hours as needed for mild pain. Unknown    buPROPion (WELLBUTRIN XL) 150 MG 24 hr tablet Take 1 tablet (150 mg) by mouth every morning. 4/21/2025 Morning    cholecalciferol, vitamin D3, 1,000 unit (25 mcg) tablet [CHOLECALCIFEROL, VITAMIN D3, 1,000 UNIT (25 MCG) TABLET] Take 1,000 Units by mouth daily. 4/21/2025 Morning    escitalopram (LEXAPRO) 20 MG tablet Take 1 tablet (20 mg) by mouth daily. 4/21/2025 Morning    guaiFENesin (ROBITUSSIN) 20 mg/mL liquid Take 10 mLs (200 mg) by mouth every 4 hours as needed for cough. Unknown    lisinopril (ZESTRIL) 20 MG tablet Take 1 tablet (20 mg) by mouth 2 times daily. 4/22/2025 Morning    melatonin 3 MG tablet Take 1 tablet (3 mg) by mouth at bedtime. 4/21/2025 Bedtime    metFORMIN (GLUCOPHAGE XR) 500 MG 24 hr tablet Take 1 tablet (500 mg) by mouth daily (with dinner). 4/21/2025 Bedtime    multivitamin therapeutic tablet [MULTIVITAMIN THERAPEUTIC TABLET] Take 1 tablet by mouth daily. Unknown    QUEtiapine (SEROQUEL) 25 MG tablet Take 1 tablet (25 mg) by mouth every 6 hours as needed (agitation, anxiety). Unknown    rosuvastatin (CRESTOR) 10 MG tablet TAKE 1 TABLET BY MOUTH  EVERYDAY AT BEDTIME 4/21/2025 Bedtime    SENNA-docusate sodium (SENNA S) 8.6-50 MG tablet Take 2 tablets by mouth at bedtime. Unknown

## 2025-04-22 NOTE — ED NOTES
"[facility] ED Handoff Report    ED Chief Complaint: Stroke Symptoms    ED Diagnosis:  (I63.9) Ischemic stroke (H)  (primary encounter diagnosis)  Comment:   Plan:     (U07.1) COVID-19  Comment:   Plan:     (R09.02) Hypoxia  Comment:   Plan:     (R41.82) Altered mental status, unspecified altered mental status type  Comment:   Plan:        PMH:    Past Medical History:   Diagnosis Date    Arthritis     Cancer (H) 02/2020    basal cell Moh's forehead    Diabetes mellitus (H)     type 2, diet controlled as of March 2019    Headache 2014    Due to spontaneous spinal fluid leak    Hyperlipidemia     Hypertension     Kidney stones 2019    Multiple sclerosis (H)     Osteoarthritis     bilateral hips    Pituitary microadenoma (H)     Posterior vitreous detachment         Code Status:  No CPR- Do NOT Intubate     Falls Risk: Yes Band: Applied    Current Living Situation/Residence: lives in an assisted living facility     Elimination Status: Continent: No and wears briefs (purewick in place)    Activity Level: Total assist/lift    Patients Preferred Language:  English     Needed: No    Vital Signs:  BP (!) 169/91   Pulse 104   Temp 98.2  F (36.8  C) (Oral)   Resp 29   Ht 1.753 m (5' 9\")   Wt 90.7 kg (200 lb)   SpO2 96%   BMI 29.53 kg/m       Cardiac Rhythm: Sinus Tachycardia    Pain Score: Patient is unable to quantify.    Is the Patient Confused:  Unable to answer orientation questions.    Last Food or Drink: 4/21/25     Focused Assessment:  Pt arrives to the ED via EMS from assisted living facility for stroke symptoms. Facility staff noted at 0800 today that pt started having left-sided facial droop and right-sided weakness. Pt does not follow commands. Will arouse to voice at times but otherwise to pain. Per pt's wife, this is not pt's baseline. He is typically more interactive and responsive. Pt has hx of MS and is wheelchair bound since a previous fall.     Pt tested positive for COVID, which " has been going around his facility. On arrival to the ED, pt's O2 on RA was 85%. Placed on 4L NC with improvement to 97%.     Tests Performed: Done: Labs and Imaging    Treatments Provided:  Fluids, meds    Family Dynamics/Concerns: No    Family Updated On Visitor Policy: Yes    Plan of Care Communicated to Family: Yes    Who Was Updated about Plan of Care: pt's wife Jeniffer Jefferson Sent with Patient: Clothing, shoes, and sling from facility sent home with pt's wife    Medications sent with patient: None    Covid: symptomatic, positive    Additional Information: Q4H neuro checks, last check completed at 1500. Dysphagia screen unable to be completed at this time d/t decreased responsiveness.     RN: Lupe Mccloud RN 4/22/2025 3:10 PM

## 2025-04-22 NOTE — ED TRIAGE NOTES
"Pt arrives via [facility] EMS from assisted living facility for stroke symptoms. Per EMS, at 0800 today the staff noted pt has slower speech and new right-sided weakness. Pt has hx of MS and has been wheelchair bound since a previous fall per pt's wife who is at bedside.     On arrival to the ED, pt does not follow commands. Unable to hold extremities up. Per EMS, pt will occasional lift both arms independently en route to the ED but will not move them when instructed. Pt will withdraw from pain. When the pt answers questions, he always says \"yeah\". Pt noted to lean to the left, pt's wife states this has been his baseline for awhile.      Triage Assessment (Adult)       Row Name 04/22/25 1158          Triage Assessment    Airway WDL WDL        Respiratory WDL    Respiratory WDL X;rhythm/pattern     Rhythm/Pattern, Respiratory shortness of breath  requiring 3L NC, not normally on oxygen        Skin Circulation/Temperature WDL    Skin Circulation/Temperature WDL WDL        Cardiac WDL    Cardiac WDL X;rhythm     Pulse Rate & Regularity tachycardic        Peripheral/Neurovascular WDL    Peripheral Neurovascular WDL WDL        Cognitive/Neuro/Behavioral WDL    Cognitive/Neuro/Behavioral WDL X     Level of Consciousness lethargic     Arousal Level arouses to pain     Orientation other (see comments)  pt does not answer orientation quiestions     Speech clear     Mood/Behavior calm        Pupils (CN II)    Pupil PERRLA yes     Pupil Size Left 3 mm     Pupil Size Right 3 mm        [place] Coma Scale    Best Eye Response 2-->(E2) to pain     Best Motor Response 4-->(M4) withdraws from pain     Best Verbal Response 3-->(V3) inappropriate words     [place] Coma Scale Score 9                     "

## 2025-04-22 NOTE — H&P
[facility]    History and Physical - Hospitalist Service       Date of Admission:  4/22/2025    Assessment & Plan    Assessment:  Iraj De Leon is a 74 year old male with a past medical history of dementia, type 2 diabetes mellitus, hypertension, multiple sclerosis presented to the hospital via EMS from care facility for evaluation of altered mental status and weakness, was positive for COVID and seemed to be hypoxic and was placed on nasal cannula, CTA head and neck was performed which was negative for acute process, MRI of brain was limited due to motion degradation but did show a possible region of punctate ischemia in the left anterior frontal lobe subcortical white matter stroke neurology was consulted and recommended admission for stroke workup along with aspirin, received aspirin and fluids in the ED and is going to be admitted for acute hypoxic respiratory failure in the setting of COVID-19 infection, acute CVA and dehydration.    Problem list and plan:  Acute metabolic encephalopathy in the setting of acute CVA, dehydration and COVID-19 infection  Patient presented to the hospital via EMS from care facility for evaluation of altered mental status and weakness  At baseline patient is wheelchair-bound with the ability to move himself around, he is interactive and talkative with care facility staff at baseline and was last seen well by his care facility staff earlier today around 8 AM, shortly after he began to be less responsive to verbal staff  EMS was called who also felt that patient had some right upper extremity weakness  Patient also had some sinus tachycardia and elevated blood pressure  In the ER vitals significant for minimal tachycardia along with elevated blood pressure  Patient was also seen to be hypoxic and was placed on 4 L nasal cannula with improvement in oxygenation  Wean off of oxygen as appropriate  Blood cultures were drawn currently pending results, viral panel  positive for COVID  VBG showed elevated pCO2 and hypoxia  Hemoglobin 16 most likely hemoconcentrated secondary to dehydration and suspecting would drop with fluid resuscitation  CTA head and neck was performed which was negative for acute process  MRI of brain was limited due to motion degradation but did show a possible region of punctate ischemia in the left anterior frontal lobe subcortical white matter   Stroke neurology was consulted and recommended admission for stroke workup along with aspirin  Received aspirin and fluids in the ED   Continue with per rectal suppository until swallow evaluation is done at which time oral aspirin can be started  Continue with doxycycline, DuoNeb, Solu-Medrol, cough meds  Gentle IV hydration  Aspiration precautions, fall precautions, seizure precautions  Follow follow viral panel, sputum culture, urine Legionella Streptococcus pneumonia antigen  Follow-up echocardiogram  Follow-up neurology for further recommendations  Incentive spirometry, pulse oximetry monitoring, flutter valve  Continue with neurochecks  Will keep n.p.o. for now until swallow evaluation is completed    Elevated bicarbonate levels most likely in setting of hypercapnia and volume contraction  Continue gentle IV hydration  Monitor bicarbonate levels    Elevated troponin most likely setting of type II NSTEMI/demand ischemia  Troponin elevated but flat  No current complaint of chest pain  Repeat EKG for symptoms  Follow-up repeat troponin level which has been ordered    History of non-insulin-dependent type 2 diabetes mellitus with hyperglycemia  Hemoglobin A1c 7.5  Monitor blood sugar level  Hypoglycemia protocol  Started on sliding scale  Will be holding metformin for now    History of hypertension  Holding lisinopril for now for permissive control of blood pressure, can restart lisinopril tomorrow as appropriate  Monitor vital signs as per protocol    History of hyperlipidemia  Continue with rosuvastatin once  "patient is able to take p.o.  Started on aspirin    History of mood disorder  History of dementia  Continue with Wellbutrin, Lexapro, Seroquel    DVT PPx  Intermittent pneumatic compression          Diet: NPO for Medical/Clinical Reasons Except for: No Exceptions    DVT Prophylaxis: Pneumatic Compression Devices  Acosta Catheter: Not present  Lines: None     Cardiac Monitoring: ACTIVE order. Indication: Stroke, acute (48 hours)  Code Status: No CPR- Do NOT Intubate    Mental status: Patient confused and minimally responsive, most of the history was obtained from chart review and discussion with the ER physician   Clinically Significant Risk Factors Present on Admission                   # Hypertension: Noted on problem list     # Dementia: noted on problem list      # DMII: A1C = 7.8 % (Ref range: <5.7 %) within past 6 months    # Overweight: Estimated body mass index is 29.53 kg/m  as calculated from the following:    Height as of this encounter: 1.753 m (5' 9\").    Weight as of this encounter: 90.7 kg (200 lb).       # Financial/Environmental Concerns:           Disposition Plan     Medically Ready for Discharge: Anticipated in 2-4 Days     Saad J. Gondal, MD  Hospitalist Service  [facility] [place]  Securely message with [employer] (more info)  Text page via [facility] Paging/Directory     ______________________________________________________________________    Chief Complaint   Altered mental status, weakness    History is obtained from the patient and chart review    History of Present Illness   Iraj De Leon is a 74 year old male with a past medical history of dementia, type 2 diabetes mellitus, hypertension, multiple sclerosis presented to the hospital via EMS from care facility for evaluation of altered mental status and weakness, at baseline patient is wheelchair-bound with the ability to move himself around, he is interactive and talkative with care facility staff at baseline and was last seen " well by his care facility staff earlier today around 8 AM, shortly after he began to be less responsive to verbal staff, EMS was called who also felt that patient had some right upper extremity weakness, patient also had some sinus tachycardia and elevated blood pressure, in the ER vitals significant for minimal tachycardia along with elevated blood pressure, patient was also seen to be hypoxic and was placed on 4 L nasal cannula with improvement in oxygenation, labs significant for elevated bicarbonate levels, hyperglycemia, troponin elevated but flat, blood cultures were drawn, viral panel positive for COVID, CTA head and neck was performed which was negative for acute process, MRI of brain was limited due to motion degradation but did show a possible region of punctate ischemia in the left anterior frontal lobe subcortical white matter stroke neurology was consulted and recommended admission for stroke workup along with aspirin, received aspirin and fluids in the ED and is going to be admitted for acute hypoxic respiratory failure in the setting of COVID-19 infection, acute CVA and dehydration.      Past Medical History    Past Medical History:   Diagnosis Date    Arthritis     Cancer (H) 02/2020    basal cell Moh's forehead    Diabetes mellitus (H)     type 2, diet controlled as of March 2019    Headache 2014    Due to spontaneous spinal fluid leak    Hyperlipidemia     Hypertension     Kidney stones 2019    Multiple sclerosis (H)     Osteoarthritis     bilateral hips    Pituitary microadenoma (H)     Posterior vitreous detachment        Past Surgical History   Past Surgical History:   Procedure Laterality Date    BASAL CELL CARCINOMA EXCISION  02/20/2020    Moh's procedure to forehead    CHOLECYSTECTOMY      COLONOSCOPY      EYE SURGERY Right     cataract    IR LUMBAR PUNCTURE  3/28/2014    JOINT REPLACEMENT  10/2019    RTHA    LITHOTRIPSY  2019    OTHER SURGICAL HISTORY  2009    lipoma removalforehead    OTHER  SURGICAL HISTORY      spinal fluid leak    [place] CYSTO/URETERO W/LITHOTRIPSY &INDWELL STENT INSRT Right 3/27/2019    Procedure: CYSTOSCOPY RIGHT RETROGRADE PYELOGRAM, RIGHT URETEROSCOPY WITH LASER  LITHOTRIPSY STONE EXTRACTION AND RIGHT STENT EXCHANGE;  Surgeon: Zia Coyle MD;  Location: [facility];  Service: Urology    [facility] TOTAL HIP ARTHROPLASTY Right 10/21/2019    Procedure: RIGHT TOTAL HIP ARTHROPLASTY;  Surgeon: Conrado Gomez MD;  Location: [facility];  Service: Orthopedics    [facility] TOTAL HIP ARTHROPLASTY Left 3/16/2020    Procedure: LEFT TOTAL HIP ARTHROPLASTY;  Surgeon: Conrado Gomez MD;  Location: [facility];  Service: Orthopedics       Prior to Admission Medications   Prior to Admission Medications   Prescriptions Last Dose Informant Patient Reported? Taking?   QUEtiapine (SEROQUEL) 25 MG tablet Unknown Spouse/Significant Other No Yes   Sig: Take 1 tablet (25 mg) by mouth every 6 hours as needed (agitation, anxiety).   SENNA-docusate sodium (SENNA S) 8.6-50 MG tablet Unknown Spouse/Significant Other Yes Yes   Sig: Take 2 tablets by mouth at bedtime.   acetaminophen (TYLENOL) 325 MG tablet Unknown Spouse/Significant Other Yes Yes   Sig: Take 650 mg by mouth every 4 hours as needed for mild pain.   buPROPion (WELLBUTRIN XL) 150 MG 24 hr tablet 4/21/2025 Morning Spouse/Significant Other No Yes   Sig: Take 1 tablet (150 mg) by mouth every morning.   cholecalciferol, vitamin D3, 1,000 unit (25 mcg) tablet 4/21/2025 Morning Spouse/Significant Other Yes Yes   Sig: [CHOLECALCIFEROL, VITAMIN D3, 1,000 UNIT (25 MCG) TABLET] Take 1,000 Units by mouth daily.   escitalopram (LEXAPRO) 20 MG tablet 4/21/2025 Morning Spouse/Significant Other No Yes   Sig: Take 1 tablet (20 mg) by mouth daily.   guaiFENesin (ROBITUSSIN) 20 mg/mL liquid Unknown Spouse/Significant Other Yes Yes   Sig: Take 10 mLs (200 mg) by mouth every 4 hours as needed for cough.   lisinopril (ZESTRIL) 20 MG tablet  4/22/2025 Morning Spouse/Significant Other No Yes   Sig: Take 1 tablet (20 mg) by mouth 2 times daily.   melatonin 3 MG tablet 4/21/2025 Bedtime Spouse/Significant Other No Yes   Sig: Take 1 tablet (3 mg) by mouth at bedtime.   metFORMIN (GLUCOPHAGE XR) 500 MG 24 hr tablet 4/21/2025 Bedtime Spouse/Significant Other No Yes   Sig: Take 1 tablet (500 mg) by mouth daily (with dinner).   multivitamin therapeutic tablet Unknown Spouse/Significant Other Yes Yes   Sig: [MULTIVITAMIN THERAPEUTIC TABLET] Take 1 tablet by mouth daily.   rosuvastatin (CRESTOR) 10 MG tablet 4/21/2025 Bedtime Spouse/Significant Other No Yes   Sig: TAKE 1 TABLET BY MOUTH EVERYDAY AT BEDTIME      Facility-Administered Medications: None        Review of Systems    The 10 point Review of Systems is negative other than noted in the HPI or here. Altered mental status, weakness    Physical Exam   Vital Signs: Temp: 98.2  F (36.8  C) Temp src: Oral BP: (!) 169/91 Pulse: 104   Resp: 29 SpO2: 96 % O2 Device: Nasal cannula Oxygen Delivery: 4 LPM  Weight: 200 lbs 0 oz    GENERAL: Patient was seen and examined at bedside with no acute distress  HENT:  Head is normocephalic, atraumatic.   EYES:  Eyes have anicteric sclerae without conjunctival injection   LUNGS: Bilateral air entry, clear to auscultation bilaterally  CARDIOVASCULAR:  Regular rate and rhythm with no murmurs, gallops or rubs.  ABDOMEN:  Normal bowel sounds. Soft; nontender   EXT: Trace lower extremity edema  SKIN:  No acute rashes.   NEUROLOGIC:  Grossly nonfocal.  Sleepy, lethargic and poorly responsive      Medical Decision Making       80 MINUTES SPENT BY ME on the date of service doing chart review, history, exam, documentation & further activities per the note.      Data     I have personally reviewed the following data over the past 24 hrs:    7.0  \   16.0   / 234     140 99 9.4 /  209 (H)   3.9 36 (H) 0.91 \     Trop: 38 (H) BNP: 678     TSH: N/A T4: N/A A1C: 7.5 (H)     Procal: 0.08  CRP: N/A Lactic Acid: 1.5       INR:  1.05 PTT:  29   D-dimer:  N/A Fibrinogen:  N/A       Imaging results reviewed over the past 24 hrs:   Recent Results (from the past 24 hours)   CTA Head Neck with Contrast    Narrative    EXAM: CTA HEAD NECK W CONTRAST  LOCATION: [facility]  DATE: 4/22/2025    INDICATION: Code Stroke, evaluate for LVO. PLEASE READ IMMEDIATELY.  COMPARISON: None.  CONTRAST: Isovue 370 67 ml  TECHNIQUE: Head and neck CT angiogram with IV contrast. Noncontrast head CT followed by axial helical CT images of the head and neck vessels obtained during the arterial phase of intravenous contrast administration. Axial 2D reconstructed images and   multiplanar 3D MIP reconstructed images of the head and neck vessels were performed by the technologist. Dose reduction techniques were used. All stenosis measurements made according to NASCET criteria unless otherwise specified.    FINDINGS:   NONCONTRAST HEAD CT:   INTRACRANIAL CONTENTS: No acute intracranial hemorrhage. Moderate ventriculomegaly findings. Lobar specific volume loss findings particularly in the temporal lobes. Correlate for dementia syndromes. Moderate chronic small vessel ischemic changes or white   matter disease. No acute loss of gray-white differentiation. No acute intracranial hemorrhage, or extra-axial hemorrhage. VISUALIZED ORBITS/SINUSES/MASTOIDS: No intraorbital abnormality. No paranasal sinus mucosal disease. No middle ear or mastoid   effusion.    BONES/SOFT TISSUES: No acute abnormality.    HEAD CTA:  ANTERIOR CIRCULATION: No stenosis/occlusion, aneurysm, or high flow vascular malformation. Standard [place] anatomy.    POSTERIOR CIRCULATION: No stenosis/occlusion, aneurysm, or high flow vascular malformation. Balanced vertebral arteries supply a normal basilar artery.     DURAL VENOUS SINUSES: Expected enhancement of the major dural venous sinuses.    NECK CTA:  RIGHT CAROTID: No measurable  stenosis or dissection.    LEFT CAROTID: No measurable stenosis or dissection.    VERTEBRAL ARTERIES: No focal stenosis or dissection. Balanced vertebral arteries.    AORTIC ARCH: Classic aortic arch anatomy with no significant stenosis at the origin of the great vessels.    NONVASCULAR STRUCTURES: Unremarkable.      Impression    IMPRESSION:   HEAD CT:  1.  No acute intracranial hemorrhage. Chronic changes as detailed above. Lobar specific temporal lobe volume loss.    HEAD CTA:   1.  No large vessel occlusion.    NECK CTA:  1.  No hemodynamically significant stenosis in the neck.  2.  Case discussed with Dr. Calix at 11:08 AM CST.   MR Brain w/o Contrast    Narrative    EXAM: MR BRAIN W/O CONTRAST  LOCATION: [facility]  DATE: 4/22/2025    INDICATION: unresponsive, right sided weakness and numbness, mixed aphasia  COMPARISON: Same day CT/CTA.  TECHNIQUE: Routine multiplanar multisequence head MRI without intravenous contrast.    FINDINGS: All sequences are significantly motion degraded.  INTRACRANIAL CONTENTS: Punctate region of high DWI signal in the left anterior subcortical white matter (series 4, image 22). Otherwise no acute or subacute infarct. No mass, acute hemorrhage, or extra-axial fluid collections. Patchy and confluent   nonspecific T2/FLAIR hyperintensities within the cerebral white matter most consistent with moderate chronic microvascular ischemic change. Suspect encephalomalacia/gliosis involving the right anterior temporal region. Moderate generalized cerebral   atrophy. No hydrocephalus. Normal position of the cerebellar tonsils.     OSSEOUS STRUCTURES/SOFT TISSUES: Normal marrow signal. The major intracranial vascular flow voids are maintained.     ORBITS: No abnormality accounting for technique.     SINUSES/MASTOIDS: No paranasal sinus mucosal disease. No middle ear or mastoid effusion.       Impression    IMPRESSION: Severely motion degraded, borderline nondiagnostic  exam. Within the aforementioned limitations possible region of punctate ischemia in the left anterior frontal lobe subcortical white matter. No sizable intracranial mass, hemorrhage or   evidence of hydrocephalus.   XR Chest Port 1 View    Narrative    EXAM: XR CHEST PORT 1 VIEW  LOCATION: [facility]  DATE: 4/22/2025    INDICATION: fever, AMS  COMPARISON: Radiograph 1/17/2025      Impression    IMPRESSION: Negative chest.

## 2025-04-23 ENCOUNTER — APPOINTMENT (OUTPATIENT)
Dept: SPEECH THERAPY | Facility: HOSPITAL | Age: 75
DRG: 064 | End: 2025-04-23
Attending: STUDENT IN AN ORGANIZED HEALTH CARE EDUCATION/TRAINING PROGRAM
Payer: MEDICARE

## 2025-04-23 PROBLEM — J96.12 ACUTE HYPOXIC ON CHRONIC HYPERCAPNIC RESPIRATORY FAILURE (H): Status: ACTIVE | Noted: 2025-04-23

## 2025-04-23 PROBLEM — J96.01 ACUTE HYPOXIC ON CHRONIC HYPERCAPNIC RESPIRATORY FAILURE (H): Status: ACTIVE | Noted: 2025-04-23

## 2025-04-23 LAB
ALBUMIN SERPL BCG-MCNC: 3.8 G/DL (ref 3.5–5.2)
ALP SERPL-CCNC: 122 U/L (ref 40–150)
ALT SERPL W P-5'-P-CCNC: 23 U/L (ref 0–70)
ANION GAP SERPL CALCULATED.3IONS-SCNC: 3 MMOL/L (ref 7–15)
AST SERPL W P-5'-P-CCNC: 21 U/L (ref 0–45)
BILIRUB DIRECT SERPL-MCNC: <0.08 MG/DL (ref 0–0.3)
BILIRUB SERPL-MCNC: 0.3 MG/DL
BUN SERPL-MCNC: 13.5 MG/DL (ref 8–23)
C PNEUM DNA SPEC QL NAA+PROBE: NOT DETECTED
CALCIUM SERPL-MCNC: 9 MG/DL (ref 8.8–10.4)
CHLORIDE SERPL-SCNC: 102 MMOL/L (ref 98–107)
CREAT SERPL-MCNC: 0.66 MG/DL (ref 0.67–1.17)
EGFRCR SERPLBLD CKD-EPI 2021: >90 ML/MIN/1.73M2
ERYTHROCYTE [DISTWIDTH] IN BLOOD BY AUTOMATED COUNT: 12.5 % (ref 10–15)
FLUAV H1 2009 PAND RNA SPEC QL NAA+PROBE: NOT DETECTED
FLUAV H1 RNA SPEC QL NAA+PROBE: NOT DETECTED
FLUAV H3 RNA SPEC QL NAA+PROBE: NOT DETECTED
FLUAV RNA SPEC QL NAA+PROBE: NOT DETECTED
FLUBV RNA SPEC QL NAA+PROBE: NOT DETECTED
GLUCOSE BLDC GLUCOMTR-MCNC: 182 MG/DL (ref 70–99)
GLUCOSE BLDC GLUCOMTR-MCNC: 218 MG/DL (ref 70–99)
GLUCOSE BLDC GLUCOMTR-MCNC: 238 MG/DL (ref 70–99)
GLUCOSE BLDC GLUCOMTR-MCNC: 246 MG/DL (ref 70–99)
GLUCOSE BLDC GLUCOMTR-MCNC: 270 MG/DL (ref 70–99)
GLUCOSE SERPL-MCNC: 217 MG/DL (ref 70–99)
HADV DNA SPEC QL NAA+PROBE: NOT DETECTED
HCO3 SERPL-SCNC: 35 MMOL/L (ref 22–29)
HCOV PNL SPEC NAA+PROBE: NOT DETECTED
HCT VFR BLD AUTO: 50.7 % (ref 40–53)
HGB BLD-MCNC: 16.9 G/DL (ref 13.3–17.7)
HMPV RNA SPEC QL NAA+PROBE: NOT DETECTED
HPIV1 RNA SPEC QL NAA+PROBE: NOT DETECTED
HPIV2 RNA SPEC QL NAA+PROBE: NOT DETECTED
HPIV3 RNA SPEC QL NAA+PROBE: NOT DETECTED
HPIV4 RNA SPEC QL NAA+PROBE: NOT DETECTED
L PNEUMO1 AG UR QL IA: NEGATIVE
M PNEUMO DNA SPEC QL NAA+PROBE: NOT DETECTED
MCH RBC QN AUTO: 29.5 PG (ref 26.5–33)
MCHC RBC AUTO-ENTMCNC: 33.3 G/DL (ref 31.5–36.5)
MCV RBC AUTO: 89 FL (ref 78–100)
PLATELET # BLD AUTO: 223 10E3/UL (ref 150–450)
POTASSIUM SERPL-SCNC: 3.8 MMOL/L (ref 3.4–5.3)
PROT SERPL-MCNC: 6.6 G/DL (ref 6.4–8.3)
RBC # BLD AUTO: 5.73 10E6/UL (ref 4.4–5.9)
RSV RNA SPEC QL NAA+PROBE: NOT DETECTED
RSV RNA SPEC QL NAA+PROBE: NOT DETECTED
RV+EV RNA SPEC QL NAA+PROBE: NOT DETECTED
S PNEUM AG SPEC QL: NEGATIVE
SODIUM SERPL-SCNC: 140 MMOL/L (ref 135–145)
SPECIMEN TYPE: NORMAL
WBC # BLD AUTO: 6.5 10E3/UL (ref 4–11)

## 2025-04-23 PROCEDURE — 85014 HEMATOCRIT: CPT | Performed by: STUDENT IN AN ORGANIZED HEALTH CARE EDUCATION/TRAINING PROGRAM

## 2025-04-23 PROCEDURE — 999N000147 HC STATISTIC PT IP EVAL DEFER

## 2025-04-23 PROCEDURE — 99233 SBSQ HOSP IP/OBS HIGH 50: CPT | Performed by: HOSPITALIST

## 2025-04-23 PROCEDURE — 87633 RESP VIRUS 12-25 TARGETS: CPT | Performed by: STUDENT IN AN ORGANIZED HEALTH CARE EDUCATION/TRAINING PROGRAM

## 2025-04-23 PROCEDURE — 999N000157 HC STATISTIC RCP TIME EA 10 MIN

## 2025-04-23 PROCEDURE — XW033E5 INTRODUCTION OF REMDESIVIR ANTI-INFECTIVE INTO PERIPHERAL VEIN, PERCUTANEOUS APPROACH, NEW TECHNOLOGY GROUP 5: ICD-10-PCS | Performed by: HOSPITALIST

## 2025-04-23 PROCEDURE — 80048 BASIC METABOLIC PNL TOTAL CA: CPT | Performed by: STUDENT IN AN ORGANIZED HEALTH CARE EDUCATION/TRAINING PROGRAM

## 2025-04-23 PROCEDURE — 99223 1ST HOSP IP/OBS HIGH 75: CPT | Performed by: PSYCHIATRY & NEUROLOGY

## 2025-04-23 PROCEDURE — 92610 EVALUATE SWALLOWING FUNCTION: CPT | Mod: GN

## 2025-04-23 PROCEDURE — 250N000012 HC RX MED GY IP 250 OP 636 PS 637: Performed by: HOSPITALIST

## 2025-04-23 PROCEDURE — 120N000001 HC R&B MED SURG/OB

## 2025-04-23 PROCEDURE — 94799 UNLISTED PULMONARY SVC/PX: CPT

## 2025-04-23 PROCEDURE — 250N000013 HC RX MED GY IP 250 OP 250 PS 637: Performed by: PSYCHIATRY & NEUROLOGY

## 2025-04-23 PROCEDURE — 258N000003 HC RX IP 258 OP 636: Performed by: HOSPITALIST

## 2025-04-23 PROCEDURE — 250N000013 HC RX MED GY IP 250 OP 250 PS 637: Performed by: STUDENT IN AN ORGANIZED HEALTH CARE EDUCATION/TRAINING PROGRAM

## 2025-04-23 PROCEDURE — 94640 AIRWAY INHALATION TREATMENT: CPT

## 2025-04-23 PROCEDURE — 84155 ASSAY OF PROTEIN SERUM: CPT | Performed by: HOSPITALIST

## 2025-04-23 PROCEDURE — 250N000009 HC RX 250: Performed by: STUDENT IN AN ORGANIZED HEALTH CARE EDUCATION/TRAINING PROGRAM

## 2025-04-23 PROCEDURE — 94640 AIRWAY INHALATION TREATMENT: CPT | Mod: 76

## 2025-04-23 PROCEDURE — 250N000011 HC RX IP 250 OP 636: Mod: JZ | Performed by: HOSPITALIST

## 2025-04-23 PROCEDURE — 258N000003 HC RX IP 258 OP 636: Performed by: STUDENT IN AN ORGANIZED HEALTH CARE EDUCATION/TRAINING PROGRAM

## 2025-04-23 PROCEDURE — 87899 AGENT NOS ASSAY W/OPTIC: CPT | Performed by: STUDENT IN AN ORGANIZED HEALTH CARE EDUCATION/TRAINING PROGRAM

## 2025-04-23 PROCEDURE — 36415 COLL VENOUS BLD VENIPUNCTURE: CPT | Performed by: STUDENT IN AN ORGANIZED HEALTH CARE EDUCATION/TRAINING PROGRAM

## 2025-04-23 PROCEDURE — 250N000011 HC RX IP 250 OP 636: Performed by: STUDENT IN AN ORGANIZED HEALTH CARE EDUCATION/TRAINING PROGRAM

## 2025-04-23 RX ORDER — GUAIFENESIN 200 MG/10ML
15 LIQUID ORAL EVERY 6 HOURS PRN
Status: DISCONTINUED | OUTPATIENT
Start: 2025-04-23 | End: 2025-04-24 | Stop reason: HOSPADM

## 2025-04-23 RX ORDER — CLOPIDOGREL BISULFATE 75 MG/1
75 TABLET ORAL DAILY
Status: DISCONTINUED | OUTPATIENT
Start: 2025-04-23 | End: 2025-04-24 | Stop reason: HOSPADM

## 2025-04-23 RX ORDER — LABETALOL HYDROCHLORIDE 5 MG/ML
10 INJECTION, SOLUTION INTRAVENOUS EVERY 10 MIN PRN
Status: DISCONTINUED | OUTPATIENT
Start: 2025-04-23 | End: 2025-04-24 | Stop reason: HOSPADM

## 2025-04-23 RX ORDER — DEXAMETHASONE 2 MG/1
6 TABLET ORAL DAILY
Status: DISCONTINUED | OUTPATIENT
Start: 2025-04-23 | End: 2025-04-24 | Stop reason: HOSPADM

## 2025-04-23 RX ORDER — HYDRALAZINE HYDROCHLORIDE 20 MG/ML
10 INJECTION INTRAMUSCULAR; INTRAVENOUS EVERY 30 MIN PRN
Status: DISCONTINUED | OUTPATIENT
Start: 2025-04-23 | End: 2025-04-24 | Stop reason: HOSPADM

## 2025-04-23 RX ADMIN — DOXYCYCLINE 100 MG: 100 INJECTION, POWDER, LYOPHILIZED, FOR SOLUTION INTRAVENOUS at 04:19

## 2025-04-23 RX ADMIN — ASPIRIN 81 MG: 81 TABLET, COATED ORAL at 10:14

## 2025-04-23 RX ADMIN — SODIUM CHLORIDE 50 ML: 9 INJECTION, SOLUTION INTRAVENOUS at 20:01

## 2025-04-23 RX ADMIN — REMDESIVIR 200 MG: 100 INJECTION, POWDER, LYOPHILIZED, FOR SOLUTION INTRAVENOUS at 17:21

## 2025-04-23 RX ADMIN — IPRATROPIUM BROMIDE AND ALBUTEROL SULFATE 3 ML: .5; 3 SOLUTION RESPIRATORY (INHALATION) at 13:23

## 2025-04-23 RX ADMIN — IPRATROPIUM BROMIDE AND ALBUTEROL SULFATE 3 ML: .5; 3 SOLUTION RESPIRATORY (INHALATION) at 07:27

## 2025-04-23 RX ADMIN — IPRATROPIUM BROMIDE AND ALBUTEROL SULFATE 3 ML: .5; 3 SOLUTION RESPIRATORY (INHALATION) at 18:28

## 2025-04-23 RX ADMIN — SODIUM CHLORIDE: 0.9 INJECTION, SOLUTION INTRAVENOUS at 08:34

## 2025-04-23 RX ADMIN — ROSUVASTATIN 10 MG: 10 TABLET, FILM COATED ORAL at 21:37

## 2025-04-23 RX ADMIN — SENNOSIDES AND DOCUSATE SODIUM 2 TABLET: 50; 8.6 TABLET ORAL at 21:37

## 2025-04-23 RX ADMIN — METHYLPREDNISOLONE SODIUM SUCCINATE 40 MG: 40 INJECTION INTRAMUSCULAR; INTRAVENOUS at 04:19

## 2025-04-23 RX ADMIN — DEXAMETHASONE 6 MG: 2 TABLET ORAL at 17:16

## 2025-04-23 RX ADMIN — ESCITALOPRAM OXALATE 20 MG: 20 TABLET ORAL at 10:13

## 2025-04-23 RX ADMIN — CLOPIDOGREL BISULFATE 75 MG: 75 TABLET, FILM COATED ORAL at 10:13

## 2025-04-23 RX ADMIN — BUPROPION HYDROCHLORIDE 150 MG: 150 TABLET, FILM COATED, EXTENDED RELEASE ORAL at 10:14

## 2025-04-23 ASSESSMENT — ACTIVITIES OF DAILY LIVING (ADL)
ADLS_ACUITY_SCORE: 71
ADLS_ACUITY_SCORE: 79
ADLS_ACUITY_SCORE: 71
ADLS_ACUITY_SCORE: 79
ADLS_ACUITY_SCORE: 80
ADLS_ACUITY_SCORE: 79
ADLS_ACUITY_SCORE: 81
ADLS_ACUITY_SCORE: 81
ADLS_ACUITY_SCORE: 79
ADLS_ACUITY_SCORE: 80
ADLS_ACUITY_SCORE: 81
ADLS_ACUITY_SCORE: 79
ADLS_ACUITY_SCORE: 79
ADLS_ACUITY_SCORE: 80
ADLS_ACUITY_SCORE: 79
ADLS_ACUITY_SCORE: 81
ADLS_ACUITY_SCORE: 71
ADLS_ACUITY_SCORE: 80
ADLS_ACUITY_SCORE: 79
ADLS_ACUITY_SCORE: 79
ADLS_ACUITY_SCORE: 71

## 2025-04-23 NOTE — PROGRESS NOTES
[facility]    Medicine Progress Note - Hospitalist Service    Date of Admission:  4/22/2025    Assessment & Plan                Irja De Leon is a 74 year old male with dementia, type 2 diabetes mellitus, hypertension, multiple sclerosis presented to the hospital via EMS from care facility for evaluation of altered mental status and weakness, found to have acute CVA as well as COVID. Hospital Day: 2    Patient is approaching comfort cares, family really do not want to put him through any invasive workup for problems in the future.    Acute metabolic encephalopathy  - Noted by nursing home staff less talkative and not moving as well as usual (is usually wheelchair-bound and slow to respond)  -likely multifactorial related to acute CVA, dehydration and COVID-19 infection  -Already seems to be better    Acute left frontal stroke  -MRI of brain was limited due to motion degradation but did show a possible region of punctate ischemia in the left anterior frontal lobe subcortical white matter   -Neurology recommending DAPT for 90 days, followed by full dose aspirin thereafter  -stroke protocol  -echo ok  -tele  -permissive HTN  -anticipate he can return to his memory care at discharge    COVID 19 viral pneumonia  Acute hypoxic hypercarbic respiratory failure  -COVID has been going around his care facility  -Started on steroid on admission, will change to Decadron  -Not started on remdesivir on admission, family have no objection to this and will start.  Would plan 5 days but can discontinue earlier if he is otherwise ready to discharge  -Wean O2- might discharge him with home O2 temporarily  -No sign of bacterial pneumonia.  Discontinue doxycycline     Elevated troponin most likely setting of type II NSTEMI/demand ischemia  -Troponin 38-->25  -Echo no WMA  -no further workup per family wishes     History of non-insulin-dependent type 2 diabetes mellitus with steroid induced  "hyperglycemia  -Hemoglobin A1c 7.5  -Hypoglycemia protocol  -sliding scale  -holding metformin given contrast given 4/22  -start NPH 5 units tomorrow     History of hypertension  -Holding lisinopril for now for permissive HTN  - Labetalol, hydralazine as needed per stroke protocol     History of hyperlipidemia  Continue with rosuvastatin once patient is able to take p.o.  Started on aspirin     History of mood disorder  History of dementia  Continue with Wellbutrin, Lexapro, Seroquel    Goals of care  -DNR/DNI  -has a Do Not Hospitalize order at home per wife, but she requested it be overridden to bring him in for evaluation this time, she is regretting this somewhat as believes it is not what he would have wanted. He did not want any heroic measures to prolong his life as he is living with dementia.  -avoid any invasive tests  -return him to his Memory Care as soon as feasible, likely tomorrow          Diet: Combination Diet Thin Liquids (level 0); Regular Diet    DVT Prophylaxis: Moderate risk.   Pneumatic Compression Devices  Acosta Catheter: Not present  Lines: None     Cardiac Monitoring: ACTIVE order. Indication: Stroke, acute (48 hours)  Code Status: No CPR- Do NOT Intubate      Clinically Significant Risk Factors           # Hypocalcemia: Lowest Ca = 8.4 mg/dL in last 2 days, will monitor and replace as appropriate         # Hypertension: Noted on problem list     # Dementia: noted on problem list       # DMII: A1C = 7.5 % (Ref range: <5.7 %) within past 6 months, PRESENT ON ADMISSION  # Overweight: Estimated body mass index is 29.53 kg/m  as calculated from the following:    Height as of this encounter: 1.753 m (5' 9\").    Weight as of this encounter: 90.7 kg (200 lb)., PRESENT ON ADMISSION     # Financial/Environmental Concerns: none         Disposition Plan     Medically Ready for Discharge: Anticipated Tomorrow         Discharge barrier(s): monitor for stability  Care discussed with: patient, RN, wife, " care mgr      Genevieve Wisdom MD  Hospitalist Service  [facility]  Securely message with [employer] (more info)  Text page via [employer] Paging/Directory   ______________________________________________________________________      Physical Exam   Vital Signs: Temp: 98  F (36.7  C) Temp src: Oral BP: (!) 166/84 Pulse: 88   Resp: 18 SpO2: 94 % O2 Device: Nasal cannula Oxygen Delivery: 3 LPM  Weight: 200 lbs 0 oz    General: in no apparent distress, non-toxic, and alert male lying in hospital bed  doesn't really respond to questions, would not head yes when I asked if he is ok  HEENT: Head normocephalic atraumatic, oral mucosa moist. Sclerae anicteric  CV: Regular rhythm, normal rate, no murmurs  Resp: No wheezes, no rales or rhonchi, no focal consolidations.  Shallow respiration  GI: Belly soft, nondistended, nontender, bowel sounds present  Skin: No rashes or lesions  Extremities: No peripheral edema  Psych: Flat affect, mood euthymic  Neuro: Grossly normal. Hand squeeze 4+/5 both sides. Doesn't respond when I request him to move his feet      Medical Decision Making               Data   Recent Results (from the past 16 hours)   Glucose by meter    Collection Time: 04/23/25  1:36 AM   Result Value Ref Range    GLUCOSE BY METER POCT 246 (H) 70 - 99 mg/dL   Respiratory Panel PCR    Collection Time: 04/23/25  5:12 AM    Specimen: Nasopharyngeal; Swab   Result Value Ref Range    Adenovirus Not Detected Not Detected    Coronavirus Not Detected Not Detected    Human Metapneumovirus Not Detected Not Detected    Human Rhin/Enterovirus Not Detected Not Detected    Influenza A Not Detected Not Detected    Influenza A, H1 Not Detected Not Detected    Influenza A 2009 H1N1 Not Detected Not Detected    Influenza A, H3 Not Detected Not Detected    Influenza B Not Detected Not Detected    Parainfluenza Virus 1 Not Detected Not Detected    Parainfluenza Virus 2 Not Detected Not Detected    Parainfluenza Virus 3 Not  Detected Not Detected    Parainfluenza Virus 4 Not Detected Not Detected    Respiratory Syncytial Virus A Not Detected Not Detected    Respiratory Syncytial Virus B Not Detected Not Detected    Chlamydia Pneumoniae Not Detected Not Detected    Mycoplasma Pneumoniae Not Detected Not Detected   Legionella Urinary Antigen and Streptococcus pneumoniae antigen    Collection Time: 04/23/25  5:17 AM    Specimen: Urine, Clean Catch   Result Value Ref Range    Legionella pneumophila serogroup 1 urinary antigen Negative Negative    Streptococcus pneumoniae antigen Negative Negative    Legionella pneumophila Urinary/Strep pneumoniae Antigen Specimen Type Urine    Glucose by meter    Collection Time: 04/23/25  6:50 AM   Result Value Ref Range    GLUCOSE BY METER POCT 218 (H) 70 - 99 mg/dL   CBC with platelets    Collection Time: 04/23/25  6:58 AM   Result Value Ref Range    WBC Count 6.5 4.0 - 11.0 10e3/uL    RBC Count 5.73 4.40 - 5.90 10e6/uL    Hemoglobin 16.9 13.3 - 17.7 g/dL    Hematocrit 50.7 40.0 - 53.0 %    MCV 89 78 - 100 fL    MCH 29.5 26.5 - 33.0 pg    MCHC 33.3 31.5 - 36.5 g/dL    RDW 12.5 10.0 - 15.0 %    Platelet Count 223 150 - 450 10e3/uL   Basic metabolic panel    Collection Time: 04/23/25  6:58 AM   Result Value Ref Range    Sodium 140 135 - 145 mmol/L    Potassium 3.8 3.4 - 5.3 mmol/L    Chloride 102 98 - 107 mmol/L    Carbon Dioxide (CO2) 35 (H) 22 - 29 mmol/L    Anion Gap 3 (L) 7 - 15 mmol/L    Urea Nitrogen 13.5 8.0 - 23.0 mg/dL    Creatinine 0.66 (L) 0.67 - 1.17 mg/dL    GFR Estimate >90 >60 mL/min/1.73m2    Calcium 9.0 8.8 - 10.4 mg/dL    Glucose 217 (H) 70 - 99 mg/dL   Hepatic panel    Collection Time: 04/23/25  6:58 AM   Result Value Ref Range    Protein Total 6.6 6.4 - 8.3 g/dL    Albumin 3.8 3.5 - 5.2 g/dL    Bilirubin Total 0.3 <=1.2 mg/dL    Alkaline Phosphatase 122 40 - 150 U/L    AST 21 0 - 45 U/L    ALT 23 0 - 70 U/L    Bilirubin Direct <0.08 0.00 - 0.30 mg/dL   Glucose by meter    Collection  Time: 04/23/25 11:44 AM   Result Value Ref Range    GLUCOSE BY METER POCT 270 (H) 70 - 99 mg/dL       Interval History     Patient doing fine today.  Moving BUE fairly well.  Doesn't respond to questions unless very simple. Per wife, fairly normal for him, he takes a long time to think when asking even routine questions like if he recognizes her.  Passed bedside swallow. Per wife, eats regular diet, needs food cut up. Ordered level 6 diet.    Wife notes patient has a Do Not Hospitalize order, but yesterday with his new symptoms she did ask that he be brought in for evaluation to understand what is going on. She is regretting this now, wondering if it is honoring his wishes. Discussed plan of care, wife agreeable. Would favor monitoring 1 more day to ensure stability, then could go home to memory care +/- O2. Wife notes patient moving soon to her same facility and could stay there until end of life.    Wife has no objection to remdesivir. Ordered this. Changed steroid to once daily for COVID protocol. Will discontinue doxy as do not see indication for this.    Possible discharge tomorrow if stable

## 2025-04-23 NOTE — TREATMENT PLAN
RCAT Treatment Plan    Patient Score: 15  Patient Acuity: 3    Clinical Indication for Therapy: covid positive    Therapy Ordered: duoneb QID    Assessment Summary: patient is here from an assisted living facility with altered mental statis. BS are very diminished bilaterally and pt is requiring 5LNC to maintain sats. Will continue with duo nebs QID.        Kenneth H. Kjellberg  4/22/2025

## 2025-04-23 NOTE — PROGRESS NOTES
Physical Therapy: Orders received. Chart reviewed and discussed with care team.? Physical Therapy not indicated due to per chart review patient is W/C bound and needs a lift device for mobility at baseline.? Defer discharge recommendations to treatment team..? Will complete orders.

## 2025-04-23 NOTE — PROGRESS NOTES
Speech-Language Pathology: Clinical Swallow Evaluation     04/23/25 0900   Appointment Info   Signing Clinician's Name / Credentials (SLP) HARLEY Vega   General Information   Onset of Illness/Injury or Date of Surgery 04/22/25   Pertinent History of Current Problem Spoke to Hospitalist prior to evaluation. Even though pt was diagnosed with a stroke, SLP to eval only swallow. Iraj De Leon is a 74 year old male with a past medical history of dementia, type 2 diabetes mellitus, hypertension, multiple sclerosis presented to the hospital via EMS from care facility for evaluation of altered mental status and weakness, was positive for COVID and seemed to be hypoxic and was placed on nasal cannula, CTA head and neck was performed which was negative for acute process, MRI of brain was limited due to motion degradation but did show a possible region of punctate ischemia in the left anterior frontal lobe subcortical white matter stroke neurology was consulted and recommended admission for stroke workup along with aspirin, received aspirin and fluids in the ED and is going to be admitted for acute hypoxic respiratory failure in the setting of COVID-19 infection, acute CVA and dehydration.   General Observations alert, pleasant, calm   Type of Evaluation   Type of Evaluation Swallow Evaluation   Oral Motor   Oral Musculature unable to assess due to poor participation/comprehension   Facial Symmetry (Oral Motor)   Comment, Facial Symmetry (Oral Motor) unable to formally assess d/t pt difficulty following oral motor commands. No oral motor concerns noted at rest and during activity of self eating and drinking from straw.   Lip Function (Oral Motor)   Comment, Lip Function (Oral Motor) unable to formally assess d/t pt difficulty following oral motor commands. No oral motor concerns noted at rest and during activity of self eating and drinking from straw.   Tongue Function (Oral Motor)   Comment, Tongue Function (Oral  Motor) unable to formally assess d/t pt difficulty following oral motor commands. No oral motor concerns noted at rest and during activity of self eating and drinking from straw.   General Swallowing Observations   Past History of Dysphagia none noted   Current Diet/Method of Nutritional Intake (General Swallowing Observations, NIS) NPO   Swallowing Evaluation Clinical swallow evaluation   Clinical Swallow Evaluation   Feeding Assistance minimal assistance required   Clinical Swallow Evaluation Textures Trialed thin liquids;solid foods   Clinical Swallow Eval: Thin Liquid Texture Trial   Mode of Presentation, Thin Liquids straw;self-fed   Volume of Liquid or Food Presented 10 oz   Oral Phase of Swallow WFL   Pharyngeal Phase of Swallow intact   Clinical Swallow Evaluation: Solid Food Texture Trial   Mode of Presentation self-fed   Volume Presented gustavo crackers   Oral Phase WFL   Pharyngeal Phase intact   Swallowing Recommendations   Diet Consistency Recommendations thin liquids (level 0);regular diet   Supervision Level for Intake close supervision needed;other (see comments)  (needed to help pt self feed/place drinks or food in hand or may need his food cut up.)   Medication Administration Recommendations, Swallowing (SLP) whole with water or in applesauce   Comment, Swallowing Recommendations regular diet with thin liquids, pt can use straws. Most likely will need assist with eating/drinking to help pt self feed/place drinks or food in hand or may need his food cut up; meds whole with water or in applesauce   Clinical Impression   Criteria for Skilled Therapeutic Interventions Met (SLP Eval) Evaluation only   Clinical Impression Comments No oral dysphagia or sx's aspiraiton noted with pt self drinking thin from straw and self feeding gustavo cracker. Recommend regular diet with thin liquids, pt can use straws. Most likely will need assist with eating/drinking to help pt self feed/place drinks or food in hand or  may need his food cut up; meds whole with water or in applesauce   SLP Total Evaluation Time   Eval: oral/pharyngeal swallow function, clinical swallow Minutes (23734) 30   SLP Time and Intention   Total Session Time (sum of timed and untimed services) 30

## 2025-04-23 NOTE — PLAN OF CARE
Problem: Stroke, Ischemic (Includes Transient Ischemic Attack)  Goal: Optimal Cognitive Function  Outcome: Progressing  Problem: Stroke, Ischemic (Includes Transient Ischemic Attack)  Goal: Improved Communication Skills  Outcome: Progressing  Problem: Stroke, Ischemic (Includes Transient Ischemic Attack)  Goal: Effective Oxygenation and Ventilation  Outcome: Progressing  Intervention: Optimize Oxygenation and Ventilation    NIH overnight were 19, 18, and 16. Patient's mentation and communication continuously improved overnight. This morning patient was alert, able to answer questions and follow some commands. Able to move all extremities and help turn self in bed. Patient pleasant and cooperative. Patient is confused and only oriented to self. PERRLA. Withdraws from pain.    BG q4h. Insulin coverage given. On IVF and IV abx. NSR on tele. VSS. Weaned to 3L of O2 nasal cannula. Plan of care ongoing.

## 2025-04-23 NOTE — PLAN OF CARE
Goal Outcome Evaluation:         Iraj had no non-verbal signs of pain.  He is slow to answer questions. He is oriented to self.  He is able to read the phrases on the NIHSS sheet when pointed too, he was unable to describe the picture and objects. He is unable to follow the directions for peripherally vision exam. Call light in reach

## 2025-04-23 NOTE — CONSULTS
Care Management Follow Up    Length of Stay (days): 1    Expected Discharge Date: 04/24/2025    Anticipated Discharge Plan:   TBD    Transportation: TBD    PT Recommendations:    OT Recommendations:        Barriers to Discharge: medical stability    Prior Living Situation: assisted living (Memory Care Faciliy) with facility resident    Advanced Directive on File: present on chart     Patient/Spokesperson Updated: No    Additional Information:  Consult received for elevated risk score. CM already following Pt. PT/OT to see Pt today, and CM will follow for recs.     Per MD, Pt may be ready to discharge tomorrow. HUA spoke with DON at [facility] memory care in [place] who confirmed they can take Pt back tomorrow as long as Pt does not have any injectable medications. They are aware of Pt being positive for COVID-19. Pt needs to arrive at memory care before 1PM. DEMARCUS asks that new medications be sent to [facility] in [place].     HUA spoke with Pt's wife Jeniffer via phone to discuss discharge plan. She confirmed plan is for Pt to return to [facility]. She is agreeable to stretcher transportation and is aware of potential out-of-pocket costs. PCS completed.    Plan for Pt to discharge back to [facility] in [place] tomorrow 4/24 via [facility] stretcher transport between 11:10-11:50AM.     [facility] - phone: [phone], fax: [phone]    Next steps:  Coordinate and confirm discharge with memory care and Pt's wife Jeniffer in AM. Fax discharge orders to memory care.     SHAW Pabon

## 2025-04-23 NOTE — PROGRESS NOTES
Occupational Therapy: Orders received. Chart reviewed and discussed with care team. Occupational Therapy not indicated due to patient being dependent with ADL's and transfers/mobility (with use of dani lift) at baseline. No additional OT needs identified. Will complete orders.     Minh Zhu, OTR   April 23, 2025

## 2025-04-23 NOTE — CONSULTS
NEUROLOGY INPATIENT CONSULTATION NOTE       [facility]  [address] [place] [address]  Tel: [phone]  Fax: [phone]  [email or web address]     Iraj De Leon,  1950, MRN [id number]  PCP: Derick April  Date: 2025     ASSESSMENT & PLAN     Diagnosis code: Subcortical infarction    Left frontal subcortical infarction  74-year-old male with HTN, DM 2, HLD, Alzheimer's dementia, major depression, CHARLIE, SNHL brought to the emergency room from care facility with altered mental status and right-sided weakness.  Patient was found to be COVID-positive and hypoxic  CT of the head, CTA head and neck unremarkable  MRI brain contaminated with movement artifact but left frontal subcortical infarct noted  Echocardiogram unremarkable  DAPT with baby aspirin and Plavix for 90 days, afterwards switch to enteric-coated aspirin 325 mg daily  Lipid panel checked yesterday shows a LDL of 56.  Continue Crestor 10 mg daily  Physical, occupational and speech therapy consult    Thank you again for this referral, please feel free to contact me if you have any questions.    Tarun Goodman MD  [facility]     CHIEF COMPLAINT Subcortical infarction (H)     HISTORY OF PRESENT ILLNESS     We have been requested by Dr. Wisdom to evaluate Iraj De Leon who is a 74 year old  male for CVA and altered mental status    Patient is a 74-year-old male with history of HTN, DM 2, HLD, Alzheimer's dementia, major depression, CHARLIE, SNHL who was brought to the emergency room from a care facility for altered mental status and weakness.  Patient was positive for COVID and hypoxic.  Apparently patient is wheelchair-bound at the facility but able to move around and is interactive but was noted to be gradually less responsive and less verbal.  Paramedics were called and he was noted to have right upper extremity weakness.  He had a CT of the head and CTA head and neck  that was unremarkable.  MRI brain was contaminated with movement artifact but small area of infarct in the left subcortical frontal infarction was noted.  Echocardiogram was unremarkable.  Patient was started on aspirin and admitted for further evaluation     PROBLEM LIST      Patient Active Problem List   Diagnosis    Degenerative joint disease (DJD) of hip    Essential hypertension    Type 2 diabetes mellitus without complication, without long-term current use of insulin (H)    Screening for AAA (abdominal aortic aneurysm)    Acute right-sided low back pain without sciatica    Calcium oxalate monohydrate kidney stones    Chronic left shoulder pain    CSF leak    Diuretic-induced hypokalemia    Encephalomalacia on imaging study    Family history of stomach cancer    History of actinic keratoses    History of tobacco use    Hyperoxaluria    Mixed hyperlipidemia    Multiple sclerosis (H)    Need for hepatitis C screening test    Onychomycosis    Other seborrheic keratosis    Sleep disturbance    Snoring    Impaired cognition    Paresis of lower extremity (H)    Alzheimer's disease (H)    Anxiety    MDD (major depressive disorder), recurrent severe, without psychosis (H)    CHARLIE (generalized anxiety disorder)    Type 2 diabetes mellitus with right eye affected by mild nonproliferative retinopathy without macular edema, without long-term current use of insulin (H)    Hypoxia    Closed nondisplaced fracture of acromial end of left clavicle, initial encounter    Adjustment disorder with mixed anxiety and depressed mood    Tinnitus, bilateral    Sensorineural hearing loss (SNHL) of both ears    Norovirus    Mild neurocognitive disorder    Microscopic hematuria    Henri hematuria    History of total hip replacement    History of basal cell carcinoma    Closed fracture of distal phalanx of right great toe    Calcium disorder    Senile dementia (H)    Kidney stone    Altered mental status, unspecified altered mental status  "type    Left frontal subcortical infarction    COVID-19      Clinically Significant Risk Factors Present on Admission                     # Hypertension: Noted on problem list         # Dementia: noted on problem list        # DMII: A1C = 7.5 % (Ref range: <5.7 %) within past 6 months      # Overweight: Estimated body mass index is 29.53 kg/m  as calculated from the following:    Height as of this encounter: 1.753 m (5' 9\").    Weight as of this encounter: 90.7 kg (200 lb).           # Financial/Environmental Concerns: none           PAST MEDICAL & SURGICAL HISTORY     Past Medical History: Patient  has a past medical history of Arthritis, Cancer (H) (02/2020), Diabetes mellitus (H), Headache (2014), Hyperlipidemia, Hypertension, Kidney stones (2019), Multiple sclerosis (H), Osteoarthritis, Pituitary microadenoma (H), and Posterior vitreous detachment.    Past Surgical History: He  has a past surgical history that includes Cholecystectomy; other surgical history (2009); Eye surgery (Right); lithotripsy (2019); Colonoscopy; Pr Cysto/Uretero W/Lithotripsy &Indwell Stent Insrt (Right, 3/27/2019); TOTAL HIP ARTHROPLASTY (Right, 10/21/2019); other surgical history; Basal Cell Carcinoma Excision (02/20/2020); joint replacement (10/2019); TOTAL HIP ARTHROPLASTY (Left, 3/16/2020); and IR Lumbar Puncture (3/28/2014).     SOCIAL HISTORY     Reviewed, and he  reports that he quit smoking about 43 years ago. He has never used smokeless tobacco. He reports that he does not currently use alcohol. He reports that he does not use drugs.     FAMILY HISTORY     Reviewed, and family history is not on file.     ALLERGIES     No Known Allergies     REVIEW OF SYSTEMS     Pertinent items are noted in HPI.     HOME & HOSPITAL MEDICATIONS     Prior to Admission Medications  Medications Prior to Admission   Medication Sig Dispense Refill Last Dose/Taking    acetaminophen (TYLENOL) 325 MG tablet Take 650 mg by mouth every 4 hours as needed " for mild pain.   Unknown    buPROPion (WELLBUTRIN XL) 150 MG 24 hr tablet Take 1 tablet (150 mg) by mouth every morning. 90 tablet 1 4/21/2025 Morning    cholecalciferol, vitamin D3, 1,000 unit (25 mcg) tablet [CHOLECALCIFEROL, VITAMIN D3, 1,000 UNIT (25 MCG) TABLET] Take 1,000 Units by mouth daily.   4/21/2025 Morning    escitalopram (LEXAPRO) 20 MG tablet Take 1 tablet (20 mg) by mouth daily. 90 tablet 1 4/21/2025 Morning    guaiFENesin (ROBITUSSIN) 20 mg/mL liquid Take 10 mLs (200 mg) by mouth every 4 hours as needed for cough.   Unknown    lisinopril (ZESTRIL) 20 MG tablet Take 1 tablet (20 mg) by mouth 2 times daily.   4/22/2025 Morning    melatonin 3 MG tablet Take 1 tablet (3 mg) by mouth at bedtime.   4/21/2025 Bedtime    metFORMIN (GLUCOPHAGE XR) 500 MG 24 hr tablet Take 1 tablet (500 mg) by mouth daily (with dinner). 90 tablet 1 4/21/2025 Bedtime    multivitamin therapeutic tablet [MULTIVITAMIN THERAPEUTIC TABLET] Take 1 tablet by mouth daily.   Unknown    QUEtiapine (SEROQUEL) 25 MG tablet Take 1 tablet (25 mg) by mouth every 6 hours as needed (agitation, anxiety).   Unknown    rosuvastatin (CRESTOR) 10 MG tablet TAKE 1 TABLET BY MOUTH EVERYDAY AT BEDTIME 90 tablet 3 4/21/2025 Bedtime    SENNA-docusate sodium (SENNA S) 8.6-50 MG tablet Take 2 tablets by mouth at bedtime.   Unknown       Hospital Medications  Current Facility-Administered Medications   Medication Dose Route Frequency Provider Last Rate Last Admin    aspirin EC tablet 81 mg  81 mg Oral Daily Gondal, Saad J, MD        Or    aspirin (ASA) chewable tablet 81 mg  81 mg Oral or NG Tube Daily Gondal, Saad J, MD        aspirin (ASA) Suppository 300 mg  300 mg Rectal Daily Gondal, Saad J, MD        buPROPion (WELLBUTRIN XL) 24 hr tablet 150 mg  150 mg Oral QAM Gondal, Saad J, MD        clopidogrel (PLAVIX) tablet 75 mg  75 mg Oral Daily Tarun Goodman MD        doxycycline (VIBRAMYCIN) 100 mg vial to attach to  mL bag  100 mg Intravenous Q12H  Gondal, Saad J, MD   100 mg at 04/23/25 0419    escitalopram (LEXAPRO) tablet 20 mg  20 mg Oral Daily Gondal, Saad J, MD        insulin aspart (NovoLOG) injection (RAPID ACTING)  1-7 Units Subcutaneous Q4H Tal Pink MD   2 Units at 04/23/25 0650    ipratropium - albuterol 0.5 mg/2.5 mg/3 mL (DUONEB) neb solution 3 mL  3 mL Nebulization 4x daily Gondal, Saad J, MD   3 mL at 04/23/25 0727    [Held by provider] lisinopril (ZESTRIL) tablet 20 mg  20 mg Oral BID Gondal, Saad J, MD        methylPREDNISolone sodium succinate (SOLU-MEDROL) injection 40 mg  40 mg Intravenous Q6H Gondal, Saad J, MD   40 mg at 04/23/25 0419    rosuvastatin (CRESTOR) tablet 10 mg  10 mg Oral At Bedtime Gondal, Saad J, MD        senna-docusate (SENOKOT-S/PERICOLACE) 8.6-50 MG per tablet 2 tablet  2 tablet Oral At Bedtime Gondal, Saad J, MD        sodium chloride (PF) 0.9% PF flush 3 mL  3 mL Intracatheter Q8H MARCIAL Gondal, Saad J, MD   3 mL at 04/22/25 2211        PHYSICAL EXAM     Vital signs  Temp:  [97.4  F (36.3  C)-100.6  F (38.1  C)] 97.8  F (36.6  C)  Pulse:  [] 88  Resp:  [16-29] 18  BP: (105-200)/() 175/85  Cuff Mean (mmHg):  [73] 73  SpO2:  [87 %-98 %] 93 %    General Physical Exam: Patient is alert and oriented x 0. Vital signs were reviewed and are documented in EMR. Neck was suple, no carotid bruit, thyromegaly, JVD or lymphadenopathy noted.  Neurological Exam:  Patient is alert and oriented x 0 pupil equal round and reactive face symmetrical.  Moves all 4 extremity left greater than right.  Reflexes trace positive in upper extremity absent in the lower extremity toes equivocal on the right downgoing on the left.  Withdraws all 4 to painful stimuli.  Did not cooperate for cerebellar testing.     DIAGNOSTIC STUDIES     Pertinent Radiology   Radiology Results: Reviewed impression and images     CT  HEAD CT:  1.  No acute intracranial hemorrhage. Chronic changes as detailed above. Lobar specific temporal lobe volume  loss.     HEAD CTA:   1.  No large vessel occlusion.     NECK CTA:  1.  No hemodynamically significant stenosis in the neck.    MRI  Severely motion degraded, borderline nondiagnostic exam. Within the aforementioned limitations possible region of punctate ischemia in the left anterior frontal lobe subcortical white matter. No sizable intracranial mass, hemorrhage or   evidence of hydrocephalus.    Echocardiogram  Left ventricular size, wall motion and function are normal. The ejection  fraction is 60-65%.  Normal right ventricle size and systolic function.  There is mild concentric left ventricular hypertrophy.  No hemodynamically significant valvular abnormalities on 2D or color flow  imaging.    Pertinent Labs   Lab Results: Personally Reviewed   Recent Results (from the past 24 hours)   Basic metabolic panel    Collection Time: 04/22/25 11:16 AM   Result Value Ref Range    Sodium 140 135 - 145 mmol/L    Potassium 3.9 3.4 - 5.3 mmol/L    Chloride 99 98 - 107 mmol/L    Carbon Dioxide (CO2) 36 (H) 22 - 29 mmol/L    Anion Gap 5 (L) 7 - 15 mmol/L    Urea Nitrogen 9.4 8.0 - 23.0 mg/dL    Creatinine 0.91 0.67 - 1.17 mg/dL    GFR Estimate 88 >60 mL/min/1.73m2    Calcium 8.4 (L) 8.8 - 10.4 mg/dL    Glucose 209 (H) 70 - 99 mg/dL   INR    Collection Time: 04/22/25 11:16 AM   Result Value Ref Range    INR 1.05 0.85 - 1.15   Partial thromboplastin time    Collection Time: 04/22/25 11:16 AM   Result Value Ref Range    aPTT 29 22 - 38 Seconds   Troponin T, High Sensitivity    Collection Time: 04/22/25 11:16 AM   Result Value Ref Range    Troponin T, High Sensitivity 35 (H) <=22 ng/L   CBC with platelets and differential    Collection Time: 04/22/25 11:16 AM   Result Value Ref Range    WBC Count 7.0 4.0 - 11.0 10e3/uL    RBC Count 5.51 4.40 - 5.90 10e6/uL    Hemoglobin 16.0 13.3 - 17.7 g/dL    Hematocrit 48.5 40.0 - 53.0 %    MCV 88 78 - 100 fL    MCH 29.0 26.5 - 33.0 pg    MCHC 33.0 31.5 - 36.5 g/dL    RDW 12.7 10.0 - 15.0 %     Platelet Count 234 150 - 450 10e3/uL    % Neutrophils 82 %    % Lymphocytes 6 %    % Monocytes 11 %    % Eosinophils 1 %    % Basophils 0 %    % Immature Granulocytes 0 %    NRBCs per 100 WBC 0 <1 /100    Absolute Neutrophils 5.7 1.6 - 8.3 10e3/uL    Absolute Lymphocytes 0.4 (L) 0.8 - 5.3 10e3/uL    Absolute Monocytes 0.8 0.0 - 1.3 10e3/uL    Absolute Eosinophils 0.0 0.0 - 0.7 10e3/uL    Absolute Basophils 0.0 0.0 - 0.2 10e3/uL    Absolute Immature Granulocytes 0.0 <=0.4 10e3/uL    Absolute NRBCs 0.0 10e3/uL   Procalcitonin    Collection Time: 04/22/25 11:16 AM   Result Value Ref Range    Procalcitonin 0.08 <0.50 ng/mL   N terminal pro BNP outpatient    Collection Time: 04/22/25 11:16 AM   Result Value Ref Range    N Terminal Pro BNP Outpatient 678 0 - 900 pg/mL   Hemoglobin A1c    Collection Time: 04/22/25 11:16 AM   Result Value Ref Range    Estimated Average Glucose 169 (H) <117 mg/dL    Hemoglobin A1C 7.5 (H) <5.7 %   Influenza A/B, RSV and SARS-CoV2 PCR (COVID-19) Nasopharyngeal    Collection Time: 04/22/25 11:31 AM    Specimen: Nasopharyngeal; Swab   Result Value Ref Range    Influenza A PCR Negative Negative    Influenza B PCR Negative Negative    RSV PCR Negative Negative    SARS CoV2 PCR Positive (A) Negative   Blood Culture Peripheral Blood    Collection Time: 04/22/25 11:48 AM    Specimen: Peripheral Blood   Result Value Ref Range    Culture No growth after 12 hours    Lactic Acid Whole Blood with 1X Repeat in 2 HR when >2    Collection Time: 04/22/25 11:48 AM   Result Value Ref Range    Lactic Acid, Initial 1.5 0.7 - 2.0 mmol/L   Blood Culture Peripheral Blood    Collection Time: 04/22/25  1:24 PM    Specimen: Peripheral Blood   Result Value Ref Range    Culture No growth after 12 hours    Troponin T, High Sensitivity    Collection Time: 04/22/25  1:24 PM   Result Value Ref Range    Troponin T, High Sensitivity 38 (H) <=22 ng/L   Blood gas venous    Collection Time: 04/22/25  1:24 PM   Result Value Ref  Range    pH Venous 7.42 7.32 - 7.43    pCO2 Venous 58 (H) 40 - 50 mm Hg    pO2 Venous 50 (H) 25 - 47 mm Hg    Bicarbonate Venous 38 (H) 21 - 28 mmol/L    Base Excess/Deficit Venous 10.2 (H) -3.0 - 3.0 mmol/L    FIO2 36     Oxyhemoglobin Venous 83 (H) 70 - 75 %    O2 Sat, Venous 84.2 (H) 70.0 - 75.0 %   Lipid panel reflex to direct LDL: Non-fasting    Collection Time: 04/22/25  1:24 PM   Result Value Ref Range    Cholesterol 118 <200 mg/dL    Triglycerides 84 <150 mg/dL    Direct Measure HDL 45 >=40 mg/dL    LDL Cholesterol Calculated 56 <100 mg/dL    Non HDL Cholesterol 73 <130 mg/dL   UA with Microscopic reflex to Culture    Collection Time: 04/22/25  2:24 PM    Specimen: Urine, Midstream   Result Value Ref Range    Color Urine Light Yellow Colorless, Straw, Light Yellow, Yellow    Appearance Urine Clear Clear    Glucose Urine 70 (A) Negative mg/dL    Bilirubin Urine Negative Negative    Ketones Urine Negative Negative mg/dL    Specific Gravity Urine 1.010 1.001 - 1.030    Blood Urine 0.06 mg/dL (A) Negative    pH Urine 7.5 (H) 5.0 - 7.0    Protein Albumin Urine 20 (A) Negative mg/dL    Urobilinogen Urine Normal Normal mg/dL    Nitrite Urine Negative Negative    Leukocyte Esterase Urine Negative Negative    RBC Urine 2 <=2 /HPF    WBC Urine 2 <=5 /HPF   Echocardiogram Complete - For age > 60 yrs    Collection Time: 04/22/25  4:04 PM   Result Value Ref Range    LVEF  60-65%    Glucose by meter    Collection Time: 04/22/25  5:53 PM   Result Value Ref Range    GLUCOSE BY METER POCT 210 (H) 70 - 99 mg/dL   Glucose by meter    Collection Time: 04/22/25 10:07 PM   Result Value Ref Range    GLUCOSE BY METER POCT 248 (H) 70 - 99 mg/dL   Troponin T, High Sensitivity    Collection Time: 04/22/25 11:26 PM   Result Value Ref Range    Troponin T, High Sensitivity 25 (H) <=22 ng/L   Glucose by meter    Collection Time: 04/23/25  1:36 AM   Result Value Ref Range    GLUCOSE BY METER POCT 246 (H) 70 - 99 mg/dL   Glucose by meter     Collection Time: 04/23/25  6:50 AM   Result Value Ref Range    GLUCOSE BY METER POCT 218 (H) 70 - 99 mg/dL   CBC with platelets    Collection Time: 04/23/25  6:58 AM   Result Value Ref Range    WBC Count 6.5 4.0 - 11.0 10e3/uL    RBC Count 5.73 4.40 - 5.90 10e6/uL    Hemoglobin 16.9 13.3 - 17.7 g/dL    Hematocrit 50.7 40.0 - 53.0 %    MCV 89 78 - 100 fL    MCH 29.5 26.5 - 33.0 pg    MCHC 33.3 31.5 - 36.5 g/dL    RDW 12.5 10.0 - 15.0 %    Platelet Count 223 150 - 450 10e3/uL   Basic metabolic panel    Collection Time: 04/23/25  6:58 AM   Result Value Ref Range    Sodium 140 135 - 145 mmol/L    Potassium 3.8 3.4 - 5.3 mmol/L    Chloride 102 98 - 107 mmol/L    Carbon Dioxide (CO2) 35 (H) 22 - 29 mmol/L    Anion Gap 3 (L) 7 - 15 mmol/L    Urea Nitrogen 13.5 8.0 - 23.0 mg/dL    Creatinine 0.66 (L) 0.67 - 1.17 mg/dL    GFR Estimate >90 >60 mL/min/1.73m2    Calcium 9.0 8.8 - 10.4 mg/dL    Glucose 217 (H) 70 - 99 mg/dL       Total time spent for face to face visit, reviewing labs/imaging studies, counseling and coordination of care was: 1 Hour 30 Minutes More than 50% of this time was spent on counseling and coordination of care.    This note was dictated using voice recognition software.  Any grammatical or context distortions are unintentional and inherent to the software.

## 2025-04-23 NOTE — PLAN OF CARE
Goal Outcome Evaluation:      Plan of Care Reviewed With: patient      Patient does not appear to be in pain. Seizure precautions initiated, iso precautions initiated. SCD's on. 5L NC sats mid 90's. NS running at 75ml/hr. Wife alexandra updated on phone. Wife stated he is from a memory care and is in the wheelchair now from recent falls. . Stroke note done, materials in the room but patient to sedated to go over information. NIH 24, hard to assess since patient not responding. Only a response of yes to a couple of questions. Wife will bring glasses to unit tomorrow.

## 2025-04-24 VITALS
HEART RATE: 92 BPM | OXYGEN SATURATION: 93 % | RESPIRATION RATE: 18 BRPM | WEIGHT: 200 LBS | DIASTOLIC BLOOD PRESSURE: 95 MMHG | SYSTOLIC BLOOD PRESSURE: 175 MMHG | TEMPERATURE: 97.4 F | HEIGHT: 69 IN | BODY MASS INDEX: 29.62 KG/M2

## 2025-04-24 LAB
ALBUMIN SERPL BCG-MCNC: 3.6 G/DL (ref 3.5–5.2)
ALP SERPL-CCNC: 103 U/L (ref 40–150)
ALT SERPL W P-5'-P-CCNC: 28 U/L (ref 0–70)
ANION GAP SERPL CALCULATED.3IONS-SCNC: 9 MMOL/L (ref 7–15)
AST SERPL W P-5'-P-CCNC: 22 U/L (ref 0–45)
BACTERIA BLD CULT: NORMAL
BACTERIA BLD CULT: NORMAL
BILIRUB SERPL-MCNC: 0.2 MG/DL
BUN SERPL-MCNC: 17.9 MG/DL (ref 8–23)
CALCIUM SERPL-MCNC: 9 MG/DL (ref 8.8–10.4)
CHLORIDE SERPL-SCNC: 99 MMOL/L (ref 98–107)
CREAT SERPL-MCNC: 0.67 MG/DL (ref 0.67–1.17)
EGFRCR SERPLBLD CKD-EPI 2021: >90 ML/MIN/1.73M2
ERYTHROCYTE [DISTWIDTH] IN BLOOD BY AUTOMATED COUNT: 12.6 % (ref 10–15)
GLUCOSE BLDC GLUCOMTR-MCNC: 197 MG/DL (ref 70–99)
GLUCOSE SERPL-MCNC: 207 MG/DL (ref 70–99)
HCO3 SERPL-SCNC: 33 MMOL/L (ref 22–29)
HCT VFR BLD AUTO: 46.6 % (ref 40–53)
HGB BLD-MCNC: 15.1 G/DL (ref 13.3–17.7)
MCH RBC QN AUTO: 28.8 PG (ref 26.5–33)
MCHC RBC AUTO-ENTMCNC: 32.4 G/DL (ref 31.5–36.5)
MCV RBC AUTO: 89 FL (ref 78–100)
PLATELET # BLD AUTO: 232 10E3/UL (ref 150–450)
POTASSIUM SERPL-SCNC: 3.8 MMOL/L (ref 3.4–5.3)
PROT SERPL-MCNC: 6 G/DL (ref 6.4–8.3)
RBC # BLD AUTO: 5.24 10E6/UL (ref 4.4–5.9)
SODIUM SERPL-SCNC: 141 MMOL/L (ref 135–145)
WBC # BLD AUTO: 7.1 10E3/UL (ref 4–11)

## 2025-04-24 PROCEDURE — 250N000012 HC RX MED GY IP 250 OP 636 PS 637: Performed by: HOSPITALIST

## 2025-04-24 PROCEDURE — 250N000013 HC RX MED GY IP 250 OP 250 PS 637: Performed by: STUDENT IN AN ORGANIZED HEALTH CARE EDUCATION/TRAINING PROGRAM

## 2025-04-24 PROCEDURE — 250N000011 HC RX IP 250 OP 636: Mod: JZ | Performed by: HOSPITALIST

## 2025-04-24 PROCEDURE — 258N000003 HC RX IP 258 OP 636: Performed by: HOSPITALIST

## 2025-04-24 PROCEDURE — 85018 HEMOGLOBIN: CPT | Performed by: HOSPITALIST

## 2025-04-24 PROCEDURE — 94640 AIRWAY INHALATION TREATMENT: CPT

## 2025-04-24 PROCEDURE — 999N000157 HC STATISTIC RCP TIME EA 10 MIN

## 2025-04-24 PROCEDURE — 250N000013 HC RX MED GY IP 250 OP 250 PS 637: Performed by: PSYCHIATRY & NEUROLOGY

## 2025-04-24 PROCEDURE — 84155 ASSAY OF PROTEIN SERUM: CPT | Performed by: HOSPITALIST

## 2025-04-24 PROCEDURE — 36415 COLL VENOUS BLD VENIPUNCTURE: CPT | Performed by: HOSPITALIST

## 2025-04-24 PROCEDURE — 99239 HOSP IP/OBS DSCHRG MGMT >30: CPT | Performed by: HOSPITALIST

## 2025-04-24 PROCEDURE — 250N000009 HC RX 250: Performed by: STUDENT IN AN ORGANIZED HEALTH CARE EDUCATION/TRAINING PROGRAM

## 2025-04-24 PROCEDURE — 99232 SBSQ HOSP IP/OBS MODERATE 35: CPT | Performed by: PSYCHIATRY & NEUROLOGY

## 2025-04-24 RX ORDER — SENNOSIDES 8.6 MG
325 CAPSULE ORAL DAILY
Qty: 90 TABLET | Refills: 0 | Status: SHIPPED | OUTPATIENT
Start: 2025-07-23 | End: 2025-04-24

## 2025-04-24 RX ORDER — CLOPIDOGREL BISULFATE 75 MG/1
75 TABLET ORAL DAILY
Qty: 90 TABLET | Refills: 0 | Status: SHIPPED | OUTPATIENT
Start: 2025-04-25 | End: 2025-04-24

## 2025-04-24 RX ORDER — SENNOSIDES 8.6 MG
325 CAPSULE ORAL DAILY
Qty: 90 TABLET | Refills: 0 | Status: SHIPPED | OUTPATIENT
Start: 2025-07-23

## 2025-04-24 RX ORDER — CLOPIDOGREL BISULFATE 75 MG/1
75 TABLET ORAL DAILY
Qty: 90 TABLET | Refills: 0 | Status: SHIPPED | OUTPATIENT
Start: 2025-04-25

## 2025-04-24 RX ORDER — ASPIRIN 81 MG/1
81 TABLET, COATED ORAL DAILY
Qty: 90 TABLET | Refills: 0 | Status: SHIPPED | OUTPATIENT
Start: 2025-04-25 | End: 2025-04-24

## 2025-04-24 RX ORDER — ASPIRIN 81 MG/1
81 TABLET, COATED ORAL DAILY
Qty: 90 TABLET | Refills: 0 | Status: SHIPPED | OUTPATIENT
Start: 2025-04-25

## 2025-04-24 RX ADMIN — ASPIRIN 81 MG: 81 TABLET, COATED ORAL at 08:00

## 2025-04-24 RX ADMIN — IPRATROPIUM BROMIDE AND ALBUTEROL SULFATE 3 ML: .5; 3 SOLUTION RESPIRATORY (INHALATION) at 08:59

## 2025-04-24 RX ADMIN — DEXAMETHASONE 6 MG: 2 TABLET ORAL at 08:00

## 2025-04-24 RX ADMIN — BUPROPION HYDROCHLORIDE 150 MG: 150 TABLET, FILM COATED, EXTENDED RELEASE ORAL at 08:00

## 2025-04-24 RX ADMIN — INSULIN HUMAN 5 UNITS: 100 INJECTION, SUSPENSION SUBCUTANEOUS at 08:02

## 2025-04-24 RX ADMIN — REMDESIVIR 100 MG: 100 INJECTION, POWDER, LYOPHILIZED, FOR SOLUTION INTRAVENOUS at 09:26

## 2025-04-24 RX ADMIN — ESCITALOPRAM OXALATE 20 MG: 20 TABLET ORAL at 08:01

## 2025-04-24 RX ADMIN — CLOPIDOGREL BISULFATE 75 MG: 75 TABLET, FILM COATED ORAL at 08:00

## 2025-04-24 RX ADMIN — SODIUM CHLORIDE 50 ML: 9 INJECTION, SOLUTION INTRAVENOUS at 09:30

## 2025-04-24 ASSESSMENT — ACTIVITIES OF DAILY LIVING (ADL)
ADLS_ACUITY_SCORE: 80
ADLS_ACUITY_SCORE: 76
ADLS_ACUITY_SCORE: 80
ADLS_ACUITY_SCORE: 76
ADLS_ACUITY_SCORE: 80
ADLS_ACUITY_SCORE: 80

## 2025-04-24 NOTE — DISCHARGE SUMMARY
[facility] MEDICINE  DISCHARGE SUMMARY     Primary Care Physician: Areli Quick  Admission Date: 4/22/2025   Discharge Provider: Genevieve Wisdom MD Discharge Date: 4/24/2025   Diet:   Active Diet and Nourishment Order   Procedures    Combination Diet Thin Liquids (level 0); Regular Diet    Diet       Code Status: No CPR- Do NOT Intubate   Activity: DCACTIVITY: Activity as tolerated        Condition at Discharge: Stable     REASON FOR PRESENTATION(See Admission Note for Details)   AMS    PRINCIPAL & ACTIVE DISCHARGE DIAGNOSES     Principal Problem:    Left frontal subcortical infarction  Active Problems:    Type 2 diabetes mellitus without complication, without long-term current use of insulin (H)    Alzheimer's disease (H)    Hypoxia    Altered mental status, unspecified altered mental status type    COVID-19    Acute hypoxic on chronic hypercapnic respiratory failure (H)      PENDING LABS     Unresulted Labs Ordered in the Past 30 Days of this Admission       Date and Time Order Name Status Description    4/22/2025 11:21 AM Blood Culture Peripheral Blood Preliminary     4/22/2025 11:21 AM Blood Culture Peripheral Blood Preliminary             PROCEDURES ( this hospitalization only)      none    RECOMMENDATIONS TO OUTPATIENT PROVIDER FOR F/U VISIT     Follow-up Appointments       Hospital Follow-up with Existing Primary Care Provider (PCP)          Schedule Primary Care visit within: 7 Days               DISPOSITION     Home (Memory Care)    SUMMARY OF HOSPITAL COURSE:      Iraj De Leon is a 74 year old male with dementia, type 2 diabetes mellitus, hypertension, multiple sclerosis presented to the hospital via EMS from care facility for evaluation of altered mental status and weakness, found to have acute CVA as well as COVID. Hospital Day: 3     Patient is approaching comfort cares, family really do not want to put him through any invasive workup for problems in  the future. He has a Do Not Hospitalize order in place. Likely not eligibly for Hospice just yet, but approaching.     Acute metabolic encephalopathy  - Noted by nursing home staff less talkative and not moving as well as usual (is usually wheelchair-bound and slow to respond)  -likely multifactorial related to acute CVA, dehydration and COVID-19 infection  -much better now     Acute left frontal stroke  -MRI of brain was limited due to motion degradation but did show a possible region of punctate ischemia in the left anterior frontal lobe subcortical white matter   -Neurology recommending DAPT for 90 days, followed by full dose aspirin thereafter  -LDL 56, already on Crestor, continue  -echo ok  -no events on tele  -already [facility] and total cares at baseline, not a rehab candidate, subtle R sided motor deficits though difficult exam with dementia     COVID 19 viral pneumonia  Acute hypoxic hypercarbic respiratory failure  -COVID has been going around his care facility. He had COVID back in January as well. Current illness seems to be mild likely due to presumptive high antibody titer.  -got steroid and remdesivir while here, weaned to room air. Screened for home O2 but did not qualify, does not need.     Goals of care  -DNR/DNI  -has a Do Not Hospitalize order at home per wife, but she requested it be overridden to bring him in for evaluation this time, she is regretting this somewhat as believes it is not what he would have wanted. He did not want any heroic measures to prolong his life as he is living with dementia.  -avoid any invasive tests  -return him to his Memory Care now      Discharge Medications with Med changes:     Current Discharge Medication List        START taking these medications    Details   !! aspirin (ASA) 325 MG EC tablet Take 1 tablet (325 mg) by mouth daily. Start when 90 day course of clopidogrel completed.  Qty: 90 tablet, Refills: 0    Associated Diagnoses: Ischemic stroke (H)       !! aspirin (ASA) 81 MG EC tablet Take 1 tablet (81 mg) by mouth daily. For 90 days. Once 90 days completed, switch to higher dose aspirin (separate Rx)  Qty: 90 tablet, Refills: 0    Associated Diagnoses: Ischemic stroke (H)      clopidogrel (PLAVIX) 75 MG tablet Take 1 tablet (75 mg) by mouth daily. For 90 days then stop.  Qty: 90 tablet, Refills: 0    Associated Diagnoses: Ischemic stroke (H)       !! - Potential duplicate medications found. Please discuss with provider.        CONTINUE these medications which have NOT CHANGED    Details   acetaminophen (TYLENOL) 325 MG tablet Take 650 mg by mouth every 4 hours as needed for mild pain.      buPROPion (WELLBUTRIN XL) 150 MG 24 hr tablet Take 1 tablet (150 mg) by mouth every morning.  Qty: 90 tablet, Refills: 1    Associated Diagnoses: Severe episode of recurrent major depressive disorder, without psychotic features (H)      cholecalciferol, vitamin D3, 1,000 unit (25 mcg) tablet [CHOLECALCIFEROL, VITAMIN D3, 1,000 UNIT (25 MCG) TABLET] Take 1,000 Units by mouth daily.      escitalopram (LEXAPRO) 20 MG tablet Take 1 tablet (20 mg) by mouth daily.  Qty: 90 tablet, Refills: 1    Associated Diagnoses: Anxiety      guaiFENesin (ROBITUSSIN) 20 mg/mL liquid Take 10 mLs (200 mg) by mouth every 4 hours as needed for cough.      lisinopril (ZESTRIL) 20 MG tablet Take 1 tablet (20 mg) by mouth 2 times daily.    Associated Diagnoses: Essential hypertension      melatonin 3 MG tablet Take 1 tablet (3 mg) by mouth at bedtime.    Associated Diagnoses: Moderate late onset Alzheimer's dementia with other behavioral disturbance (H)      metFORMIN (GLUCOPHAGE XR) 500 MG 24 hr tablet Take 1 tablet (500 mg) by mouth daily (with dinner).  Qty: 90 tablet, Refills: 1    Associated Diagnoses: Type 2 diabetes mellitus without complication, without long-term current use of insulin (H)      multivitamin therapeutic tablet [MULTIVITAMIN THERAPEUTIC TABLET] Take 1 tablet by mouth daily.       QUEtiapine (SEROQUEL) 25 MG tablet Take 1 tablet (25 mg) by mouth every 6 hours as needed (agitation, anxiety).    Associated Diagnoses: Moderate late onset Alzheimer's dementia with other behavioral disturbance (H)      rosuvastatin (CRESTOR) 10 MG tablet TAKE 1 TABLET BY MOUTH EVERYDAY AT BEDTIME  Qty: 90 tablet, Refills: 3    Associated Diagnoses: Hyperlipidemia LDL goal <100      SENNA-docusate sodium (SENNA S) 8.6-50 MG tablet Take 2 tablets by mouth at bedtime.               Consults     SPEECH LANGUAGE PATH ADULT IP CONSULT  PHARMACY IP CONSULT  PHARMACY IP CONSULT  PHYSICAL THERAPY ADULT IP CONSULT  OCCUPATIONAL THERAPY ADULT IP CONSULT  REHAB ADMISSIONS LIAISON IP CONSULT  CARE MANAGEMENT / SOCIAL WORK IP CONSULT  NEUROLOGY IP CONSULT  CARE MANAGEMENT / SOCIAL WORK IP CONSULT  SMOKING CESSATION PROGRAM IP CONSULT          SIGNIFICANT IMAGING FINDINGS     Results for orders placed or performed during the hospital encounter of 04/22/25   CTA Head Neck with Contrast    Impression    IMPRESSION:   HEAD CT:  1.  No acute intracranial hemorrhage. Chronic changes as detailed above. Lobar specific temporal lobe volume loss.    HEAD CTA:   1.  No large vessel occlusion.    NECK CTA:  1.  No hemodynamically significant stenosis in the neck.  2.  Case discussed with Dr. Calix at 11:08 AM CST.   XR Chest Port 1 View    Impression    IMPRESSION: Negative chest.   MR Brain w/o Contrast    Impression    IMPRESSION: Severely motion degraded, borderline nondiagnostic exam. Within the aforementioned limitations possible region of punctate ischemia in the left anterior frontal lobe subcortical white matter. No sizable intracranial mass, hemorrhage or   evidence of hydrocephalus.   Echocardiogram Complete - For age > 60 yrs   Result Value Ref Range    LVEF  60-65%        SIGNIFICANT LABORATORY FINDINGS     See emr    Discharge Orders        Reason for your hospital stay    Stroke, COVID19     Activity    Your activity upon  discharge: activity as tolerated     When to contact your care team    Call your primary doctor if you have any of the following: chest pain, shortness of breath, fever, chills, fainting, dizziness, vomiting, constipation, dehydration, worsening pain, bleeding, or trouble urinating, or any other symptoms that are new or concerning to you.     No CPR- Do NOT Intubate     Diet    Follow this diet upon discharge: resume your regular diet     Hospital Follow-up with Existing Primary Care Provider (PCP)            Examination   Physical Exam   Temp:  [97.4  F (36.3  C)-99  F (37.2  C)] 97.4  F (36.3  C)  Pulse:  [69-92] 92  Resp:  [16-18] 18  BP: (166-199)/(80-97) 175/95  SpO2:  [92 %-98 %] 93 %  Wt Readings from Last 1 Encounters:   04/22/25 90.7 kg (200 lb)       General: in no apparent distress, non-toxic, and alert male lying in hospital bed, seems much more bright and alert today and was responding to questions, didn't know the answer to some though  HEENT: Head normocephalic atraumatic, oral mucosa moist. Sclerae anicteric  Skin: No rashes or lesions  Extremities: No peripheral edema  Psych: Flat affect, mood euthymic  Neuro: Grossly normal.    Please see EMR for more detailed significant labs, imaging, consultant notes etc.    IGenevieve MD, personally saw the patient today and spent greater than 30 minutes discharging this patient.    Genevieve Wisdom MD  [facility]    CC:Areli Sepulveda

## 2025-04-24 NOTE — PLAN OF CARE
Problem: Adult Inpatient Plan of Care  Goal: Optimal Comfort and Wellbeing  Outcome: Progressing     Problem: Delirium  Goal: Improved Sleep  Outcome: Progressing     Problem: Pneumonia  Goal: Resolution of Infection Signs and Symptoms  Outcome: Progressing  Intervention: Prevent Infection Progression  Recent Flowsheet Documentation  Taken 4/24/2025 0400 by Nadira Pitt RN  Isolation Precautions: special precautions maintained  Taken 4/23/2025 2345 by Nadira Pitt RN  Isolation Precautions: special precautions maintained     Problem: Comorbidity Management  Goal: Blood Pressure in Desired Range  Outcome: Progressing  Intervention: Maintain Blood Pressure Management  Recent Flowsheet Documentation  Taken 4/24/2025 0400 by Nadira Pitt RN  Medication Review/Management: medications reviewed  Taken 4/23/2025 2345 by Nadira Pitt RN  Medication Review/Management: medications reviewed     Problem: Confusion Acute  Goal: Optimal Cognitive Function  Outcome: Progressing     Problem: Stroke, Ischemic (Includes Transient Ischemic Attack)  Goal: Effective Bowel Elimination  Outcome: Progressing   Goal Outcome Evaluation: alert and oriented to self. Denies pain. NIH 7. On 2L. Pt desat at night to 88. Turned q2 hrs.

## 2025-04-24 NOTE — PROGRESS NOTES
Care Management Discharge Note    Discharge Date: 04/24/2025       Discharge Disposition:  home to memory care    Discharge Services:      Discharge DME:      Discharge Transportation:  stretcher    Private pay costs discussed: transportation costs    Does the patient's insurance plan have a 3 day qualifying hospital stay waiver?  No    PAS Confirmation Code:    Patient/family educated on Medicare website which has current facility and service quality ratings:      Education Provided on the Discharge Plan:    Persons Notified of Discharge Plans: spouse; [facility]   Patient/Family in Agreement with the Plan:  yes    Handoff Referral Completed: No, handoff not indicated or clinically appropriate    Additional Information:    Patient stable to discharge back to [facility].  Spoke with DEMARCUS Copeland at [employer] re this; reviewed and confirmed scheduled transportation later this AM.  Updated wife.  Discharge order faxed to [facility].     SHAW Lozano

## 2025-04-24 NOTE — PLAN OF CARE
Problem: Adult Inpatient Plan of Care  Goal: Absence of Hospital-Acquired Illness or Injury  Intervention: Prevent Skin Injury  Recent Flowsheet Documentation  Taken 4/23/2025 2202 by Kezia Corral RN  Body Position:   supine   supine, legs elevated  Taken 4/23/2025 1955 by Kezia Corral RN  Body Position:   turned   left  Taken 4/23/2025 1645 by Kezia Corral RN  Body Position:   turned   right   log-rolled   legs elevated   heels elevated     Problem: Adult Inpatient Plan of Care  Goal: Absence of Hospital-Acquired Illness or Injury  Intervention: Prevent and Manage VTE (Venous Thromboembolism) Risk  Recent Flowsheet Documentation  Taken 4/23/2025 2009 by Kezia Corral RN  VTE Prevention/Management: SCDs on (sequential compression devices)  Taken 4/23/2025 1645 by Kezia Corral RN  VTE Prevention/Management: SCDs on (sequential compression devices)   Goal Outcome Evaluation:      Plan of Care Reviewed With: patient      Patient denies pain. SCD's on. Alert but confused. SR on tele. Tried a pure wick and primo fit but patient pulled both off, whole linen change and gown change done 2x on shift. Good appetite today and able to feed self. Took off NC but O2 has been around 91%.  and 238. Takes pills whole. Saline locked. Precautions maintained. NIH changes due to extremity movement, not sure if he can't hole up extremities or doesn't understand what is being asked.

## 2025-04-24 NOTE — PROGRESS NOTES
NEUROLOGY INPATIENT PROGRESS NOTE       [facility] NEUR[facility] [place]  [address] [place] [address]  Tel: [phone]  Fax: [phone]  [email or web address]     ASSESSMENT & PLAN   Date: 04/24/2025  Hospital Day#: 2  Visit diagnosis: Left subcortical infarct    Left frontal subcortical infarction  74-year-old male with HTN, DM 2, HLD, Alzheimer's dementia, major depression, CHARLIE, SNHL brought to the emergency room from care facility with altered mental status and right-sided weakness.  Patient was found to be COVID-positive and hypoxic  CT of the head, CTA head and neck unremarkable  MRI brain contaminated with movement artifact but left frontal subcortical infarct noted  Echocardiogram unremarkable  DAPT with baby aspirin and Plavix for 90 days, afterwards switch to enteric-coated aspirin 325 mg daily  Lipid panel checked yesterday shows a LDL of 56.  Continue Crestor 10 mg daily  Patient is DNR/DNI and he had a do not hospitalize order at home per his wife.  Nothing more to add from neurology standpoint, I will sign off    Neurology Discharge Planning :   Discharge when okay with hospitalist    Tarun Goodman MD  [facility] NEUROLOG[facility], [place]     PROBLEM LIST      Patient Active Problem List   Diagnosis    Degenerative joint disease (DJD) of hip    Essential hypertension    Type 2 diabetes mellitus without complication, without long-term current use of insulin (H)    Screening for AAA (abdominal aortic aneurysm)    Acute right-sided low back pain without sciatica    Calcium oxalate monohydrate kidney stones    Chronic left shoulder pain    CSF leak    Diuretic-induced hypokalemia    Encephalomalacia on imaging study    Family history of stomach cancer    History of actinic keratoses    History of tobacco use    Hyperoxaluria    Mixed hyperlipidemia    Multiple sclerosis (H)    Need for hepatitis C screening test    Onychomycosis    Other seborrheic keratosis    Sleep  disturbance    Snoring    Impaired cognition    Paresis of lower extremity (H)    Alzheimer's disease (H)    Anxiety    MDD (major depressive disorder), recurrent severe, without psychosis (H)    CHARLIE (generalized anxiety disorder)    Type 2 diabetes mellitus with right eye affected by mild nonproliferative retinopathy without macular edema, without long-term current use of insulin (H)    Hypoxia    Closed nondisplaced fracture of acromial end of left clavicle, initial encounter    Adjustment disorder with mixed anxiety and depressed mood    Tinnitus, bilateral    Sensorineural hearing loss (SNHL) of both ears    Norovirus    Mild neurocognitive disorder    Microscopic hematuria    Henri hematuria    History of total hip replacement    History of basal cell carcinoma    Closed fracture of distal phalanx of right great toe    Calcium disorder    Senile dementia (H)    Kidney stone    Altered mental status, unspecified altered mental status type    Left frontal subcortical infarction    COVID-19    Acute hypoxic on chronic hypercapnic respiratory failure (H)       Past Medical History:   Patient  has a past medical history of Arthritis, Cancer (H) (02/2020), Diabetes mellitus (H), Headache (2014), Hyperlipidemia, Hypertension, Kidney stones (2019), Multiple sclerosis (H), Osteoarthritis, Pituitary microadenoma (H), and Posterior vitreous detachment.     SUBJECTIVE     Patient denies any complaint alert but confused     OBJECTIVE     Vital signs in last 24 hours  Temp:  [97.4  F (36.3  C)-99  F (37.2  C)] 97.4  F (36.3  C)  Pulse:  [69-87] 78  Resp:  [16-18] 18  BP: (145-199)/(80-97) 175/95  SpO2:  [92 %-98 %] 98 %    Review of Systems   Pertinent items are noted in HPI.    General Physical Exam: Patient is alert and oriented x 0. Vital signs were reviewed and are documented in EMR. Neck was suple, no carotid bruit, thyromegaly, JVD or lymphadenopathy noted.  Neurological Exam:  Patient is alert and oriented x 0 pupil  equal round and reactive face symmetrical.  Moves all 4 extremity left greater than right.  Reflexes trace positive in upper extremity absent in the lower extremity toes equivocal on the right downgoing on the left.  Withdraws all 4 to painful stimuli.  Did not cooperate for cerebellar testing.     DIAGNOSTIC STUDIES     Pertinent Radiology   Following imaging studies were reviewed:    CT  HEAD CT:  1.  No acute intracranial hemorrhage. Chronic changes as detailed above. Lobar specific temporal lobe volume loss.     HEAD CTA:   1.  No large vessel occlusion.     NECK CTA:  1.  No hemodynamically significant stenosis in the neck.     MRI  Severely motion degraded, borderline nondiagnostic exam. Within the aforementioned limitations possible region of punctate ischemia in the left anterior frontal lobe subcortical white matter. No sizable intracranial mass, hemorrhage or   evidence of hydrocephalus.     Echocardiogram  Left ventricular size, wall motion and function are normal. The ejection  fraction is 60-65%.  Normal right ventricle size and systolic function.  There is mild concentric left ventricular hypertrophy.  No hemodynamically significant valvular abnormalities on 2D or color flow  imaging.    Pertinent Labs   Lab Results: Personally Reviewed  Recent Results (from the past 24 hours)   Glucose by meter    Collection Time: 04/23/25 11:44 AM   Result Value Ref Range    GLUCOSE BY METER POCT 270 (H) 70 - 99 mg/dL   Glucose by meter    Collection Time: 04/23/25  4:42 PM   Result Value Ref Range    GLUCOSE BY METER POCT 182 (H) 70 - 99 mg/dL   Glucose by meter    Collection Time: 04/23/25  9:15 PM   Result Value Ref Range    GLUCOSE BY METER POCT 238 (H) 70 - 99 mg/dL   CBC with platelets    Collection Time: 04/24/25  7:22 AM   Result Value Ref Range    WBC Count 7.1 4.0 - 11.0 10e3/uL    RBC Count 5.24 4.40 - 5.90 10e6/uL    Hemoglobin 15.1 13.3 - 17.7 g/dL    Hematocrit 46.6 40.0 - 53.0 %    MCV 89 78 - 100 fL     MCH 28.8 26.5 - 33.0 pg    MCHC 32.4 31.5 - 36.5 g/dL    RDW 12.6 10.0 - 15.0 %    Platelet Count 232 150 - 450 10e3/uL         HOSPITAL MEDICATIONS     Current Facility-Administered Medications   Medication Dose Route Frequency Provider Last Rate Last Admin    aspirin EC tablet 81 mg  81 mg Oral Daily Gondal, Saad J, MD        Or    aspirin (ASA) chewable tablet 81 mg  81 mg Oral or NG Tube Daily Gondal, Saad J, MD   81 mg at 04/24/25 0800    buPROPion (WELLBUTRIN XL) 24 hr tablet 150 mg  150 mg Oral QAM Gondal, Saad J, MD   150 mg at 04/24/25 0800    clopidogrel (PLAVIX) tablet 75 mg  75 mg Oral Daily Tarun Goodman MD   75 mg at 04/24/25 0800    dexAMETHasone (DECADRON) tablet 6 mg  6 mg Oral Daily Genevieve Wisdom MD   6 mg at 04/24/25 0800    escitalopram (LEXAPRO) tablet 20 mg  20 mg Oral Daily Gondal, Saad J, MD   20 mg at 04/24/25 0801    insulin aspart (NovoLOG) injection (RAPID ACTING)  1-7 Units Subcutaneous TID w/meals Genevieve Wisdom MD   1 Units at 04/23/25 1717    insulin NPH injection 5 Units  5 Units Subcutaneous QAM AC Genevieve Wisdom MD   5 Units at 04/24/25 0802    ipratropium - albuterol 0.5 mg/2.5 mg/3 mL (DUONEB) neb solution 3 mL  3 mL Nebulization 4x daily Gondal, Saad J, MD   3 mL at 04/23/25 1828    [Held by provider] lisinopril (ZESTRIL) tablet 20 mg  20 mg Oral BID Gondal, Saad J, MD        remdesivir 100 mg in sodium chloride 0.9 % 100 mL intermittent infusion  100 mg Intravenous Q24H Genevieve Wisdom MD        And    sodium chloride 0.9% BOLUS 50 mL  50 mL Intravenous Q24H Genevieve Wisdom MD        rosuvastatin (CRESTOR) tablet 10 mg  10 mg Oral At Bedtime Gondal, Saad J, MD   10 mg at 04/23/25 2137    senna-docusate (SENOKOT-S/PERICOLACE) 8.6-50 MG per tablet 2 tablet  2 tablet Oral At Bedtime Gondal, Saad J, MD   2 tablet at 04/23/25 2137    sodium chloride (PF) 0.9% PF flush 3 mL  3 mL Intracatheter Q8H [facility] Gondal, Saad J, MD   3 mL at 04/23/25 2137        Total time spent for face to  face visit, reviewing labs/imaging studies, counseling and coordination of care was: 45 Minutes More than 50% of this time was spent on counseling and coordination of care.      This note was dictated using voice recognition software.  Any grammatical or context distortions are unintentional and inherent to the software.

## 2025-04-24 NOTE — PROGRESS NOTES
Patient has been assessed for Home Oxygen needs.     Pulse oximetry (SpO2) and Oxygen flow readings:    SpO2 = 93% on room air at rest while awake.    SpO2 improved to NA% on   liters/minute at rest.    SpO2 = Patient is wheelchair bound% on room air during activity/with exercise.    *SpO2 improved to NA% on   liters/minute during activity/with exercise.

## 2025-04-27 LAB
BACTERIA BLD CULT: NO GROWTH
BACTERIA BLD CULT: NO GROWTH

## 2025-04-28 ENCOUNTER — TELEPHONE (OUTPATIENT)
Dept: FAMILY MEDICINE | Facility: CLINIC | Age: 75
End: 2025-04-28
Payer: MEDICARE

## 2025-04-28 NOTE — TELEPHONE ENCOUNTER
[facility] team  [facility] said they are missing page 6 of the plan of care sent to them. It was written on 4/24.  Can you fax to [facility] # [phone]

## 2025-05-01 ENCOUNTER — DOCUMENTATION ONLY (OUTPATIENT)
Dept: OTHER | Facility: CLINIC | Age: 75
End: 2025-05-01
Payer: MEDICARE

## 2025-05-01 LAB
ATRIAL RATE - MUSE: 105 BPM
DIASTOLIC BLOOD PRESSURE - MUSE: NORMAL MMHG
INTERPRETATION ECG - MUSE: NORMAL
P AXIS - MUSE: 46 DEGREES
PR INTERVAL - MUSE: 178 MS
QRS DURATION - MUSE: 104 MS
QT - MUSE: 380 MS
QTC - MUSE: 502 MS
R AXIS - MUSE: -15 DEGREES
SYSTOLIC BLOOD PRESSURE - MUSE: NORMAL MMHG
T AXIS - MUSE: 48 DEGREES
VENTRICULAR RATE- MUSE: 105 BPM

## 2025-05-13 ENCOUNTER — MEDICAL CORRESPONDENCE (OUTPATIENT)
Dept: HEALTH INFORMATION MANAGEMENT | Facility: CLINIC | Age: 75
End: 2025-05-13
Payer: MEDICARE

## 2025-05-23 ENCOUNTER — APPOINTMENT (OUTPATIENT)
Dept: CT IMAGING | Facility: HOSPITAL | Age: 75
End: 2025-05-23
Attending: EMERGENCY MEDICINE
Payer: MEDICARE

## 2025-05-23 ENCOUNTER — HOSPITAL ENCOUNTER (EMERGENCY)
Facility: HOSPITAL | Age: 75
Discharge: HOME | End: 2025-05-23
Attending: EMERGENCY MEDICINE | Admitting: EMERGENCY MEDICINE
Payer: MEDICARE

## 2025-05-23 VITALS
RESPIRATION RATE: 20 BRPM | SYSTOLIC BLOOD PRESSURE: 194 MMHG | OXYGEN SATURATION: 94 % | HEART RATE: 101 BPM | TEMPERATURE: 97.8 F | DIASTOLIC BLOOD PRESSURE: 129 MMHG
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
## 2025-05-23 DIAGNOSIS — S01.312A LACERATION OF HELIX OF LEFT EAR, INITIAL ENCOUNTER: ICD-10-CM

## 2025-05-23 LAB
ALBUMIN SERPL BCG-MCNC: 3.6 G/DL (ref 3.5–5.2)
ALP SERPL-CCNC: 116 U/L (ref 40–150)
ALT SERPL W P-5'-P-CCNC: 30 U/L (ref 0–70)
ANION GAP SERPL CALCULATED.3IONS-SCNC: 7 MMOL/L (ref 7–15)
AST SERPL W P-5'-P-CCNC: 20 U/L (ref 0–45)
BASE EXCESS BLDV CALC-SCNC: 7.8 MMOL/L (ref -3–3)
BASOPHILS # BLD AUTO: 0.1 10E3/UL (ref 0–0.2)
BASOPHILS NFR BLD AUTO: 1 %
BILIRUB DIRECT SERPL-MCNC: <0.08 MG/DL (ref 0–0.3)
BILIRUB SERPL-MCNC: 0.2 MG/DL
BUN SERPL-MCNC: 15.8 MG/DL (ref 8–23)
CALCIUM SERPL-MCNC: 9.1 MG/DL (ref 8.8–10.4)
CHLORIDE SERPL-SCNC: 104 MMOL/L (ref 98–107)
CREAT SERPL-MCNC: 0.87 MG/DL (ref 0.67–1.17)
EGFRCR SERPLBLD CKD-EPI 2021: >90 ML/MIN/1.73M2
EOSINOPHIL # BLD AUTO: 0.2 10E3/UL (ref 0–0.7)
EOSINOPHIL NFR BLD AUTO: 2 %
ERYTHROCYTE [DISTWIDTH] IN BLOOD BY AUTOMATED COUNT: 12.5 % (ref 10–15)
FLUAV RNA SPEC QL NAA+PROBE: NEGATIVE
FLUBV RNA RESP QL NAA+PROBE: NEGATIVE
GLUCOSE SERPL-MCNC: 256 MG/DL (ref 70–99)
HCO3 BLDV-SCNC: 34 MMOL/L (ref 21–28)
HCO3 SERPL-SCNC: 34 MMOL/L (ref 22–29)
HCT VFR BLD AUTO: 45.8 % (ref 40–53)
HGB BLD-MCNC: 15.4 G/DL (ref 13.3–17.7)
HOLD SPECIMEN: NORMAL
IMM GRANULOCYTES # BLD: 0 10E3/UL
IMM GRANULOCYTES NFR BLD: 0 %
LACTATE SERPL-SCNC: 1.4 MMOL/L (ref 0.7–2)
LYMPHOCYTES # BLD AUTO: 1.3 10E3/UL (ref 0.8–5.3)
LYMPHOCYTES NFR BLD AUTO: 17 %
MCH RBC QN AUTO: 29.4 PG (ref 26.5–33)
MCHC RBC AUTO-ENTMCNC: 33.6 G/DL (ref 31.5–36.5)
MCV RBC AUTO: 88 FL (ref 78–100)
MONOCYTES # BLD AUTO: 0.6 10E3/UL (ref 0–1.3)
MONOCYTES NFR BLD AUTO: 8 %
NEUTROPHILS # BLD AUTO: 5.7 10E3/UL (ref 1.6–8.3)
NEUTROPHILS NFR BLD AUTO: 72 %
NRBC # BLD AUTO: 0 10E3/UL
NRBC BLD AUTO-RTO: 0 /100
O2/TOTAL GAS SETTING VFR VENT: 21 %
OXYHGB MFR BLDV: 83 % (ref 70–75)
PCO2 BLDV: 52 MM HG (ref 40–50)
PH BLDV: 7.43 [PH] (ref 7.32–7.43)
PLATELET # BLD AUTO: 272 10E3/UL (ref 150–450)
PO2 BLDV: 49 MM HG (ref 25–47)
POTASSIUM SERPL-SCNC: 3.9 MMOL/L (ref 3.4–5.3)
PROT SERPL-MCNC: 6.1 G/DL (ref 6.4–8.3)
RBC # BLD AUTO: 5.23 10E6/UL (ref 4.4–5.9)
RSV RNA SPEC NAA+PROBE: NEGATIVE
SAO2 % BLDV: 83.5 % (ref 70–75)
SARS-COV-2 RNA RESP QL NAA+PROBE: NEGATIVE
SODIUM SERPL-SCNC: 145 MMOL/L (ref 135–145)
TROPONIN T SERPL HS-MCNC: 22 NG/L
TROPONIN T SERPL HS-MCNC: 23 NG/L
WBC # BLD AUTO: 7.9 10E3/UL (ref 4–11)

## 2025-05-23 PROCEDURE — 71275 CT ANGIOGRAPHY CHEST: CPT

## 2025-05-23 PROCEDURE — 250N000009 HC RX 250: Performed by: EMERGENCY MEDICINE

## 2025-05-23 PROCEDURE — 80048 BASIC METABOLIC PNL TOTAL CA: CPT | Performed by: EMERGENCY MEDICINE

## 2025-05-23 PROCEDURE — 99285 EMERGENCY DEPT VISIT HI MDM: CPT | Mod: 25 | Performed by: EMERGENCY MEDICINE

## 2025-05-23 PROCEDURE — 36415 COLL VENOUS BLD VENIPUNCTURE: CPT | Performed by: STUDENT IN AN ORGANIZED HEALTH CARE EDUCATION/TRAINING PROGRAM

## 2025-05-23 PROCEDURE — 250N000011 HC RX IP 250 OP 636: Performed by: EMERGENCY MEDICINE

## 2025-05-23 PROCEDURE — 93005 ELECTROCARDIOGRAM TRACING: CPT | Performed by: STUDENT IN AN ORGANIZED HEALTH CARE EDUCATION/TRAINING PROGRAM

## 2025-05-23 PROCEDURE — 36415 COLL VENOUS BLD VENIPUNCTURE: CPT | Performed by: EMERGENCY MEDICINE

## 2025-05-23 PROCEDURE — 84484 ASSAY OF TROPONIN QUANT: CPT | Performed by: EMERGENCY MEDICINE

## 2025-05-23 PROCEDURE — 83605 ASSAY OF LACTIC ACID: CPT | Performed by: EMERGENCY MEDICINE

## 2025-05-23 PROCEDURE — 12011 RPR F/E/E/N/L/M 2.5 CM/<: CPT | Performed by: EMERGENCY MEDICINE

## 2025-05-23 PROCEDURE — 70450 CT HEAD/BRAIN W/O DYE: CPT

## 2025-05-23 PROCEDURE — 82805 BLOOD GASES W/O2 SATURATION: CPT | Performed by: EMERGENCY MEDICINE

## 2025-05-23 PROCEDURE — 87637 SARSCOV2&INF A&B&RSV AMP PRB: CPT | Performed by: EMERGENCY MEDICINE

## 2025-05-23 PROCEDURE — 82248 BILIRUBIN DIRECT: CPT | Performed by: EMERGENCY MEDICINE

## 2025-05-23 PROCEDURE — 85025 COMPLETE CBC W/AUTO DIFF WBC: CPT | Performed by: EMERGENCY MEDICINE

## 2025-05-23 PROCEDURE — 72125 CT NECK SPINE W/O DYE: CPT

## 2025-05-23 RX ORDER — IOPAMIDOL 755 MG/ML
80 INJECTION, SOLUTION INTRAVASCULAR ONCE
Status: COMPLETED | OUTPATIENT
Start: 2025-05-23 | End: 2025-05-23

## 2025-05-23 RX ADMIN — IOPAMIDOL 80 ML: 755 INJECTION, SOLUTION INTRAVENOUS at 19:32

## 2025-05-23 RX ADMIN — Medication 3 ML: at 21:46

## 2025-05-23 ASSESSMENT — ACTIVITIES OF DAILY LIVING (ADL)
ADLS_ACUITY_SCORE: 61

## 2025-05-23 NOTE — ED PROVIDER NOTES
EMERGENCY DEPARTMENT NOTE     Name: Iraj De Leon    Age/Sex: 74 year old male   MRN: [id number]   Evaluation Date & Time:  5/23/2025  4:34 PM    PCP:    Areli Sepulveda   ED Provider: Lencho Knight D.O.       CHIEF COMPLAINT    Fall     HISTORY OF PRESENT ILLNESS BRIEF   Iraj De Leon is a 74 year old year old male with a relevant past history of dementia, NIDDM, hypertension, hyperlipidemia who presents to the ED  for evaluation after being found down on the floor memory care unit within the last 2 hours with probable fall.      DIAGNOSIS & DISPOSITION/MEDICAL DECISION MAKING     1. Fall, initial encounter    2. Laceration of helix of left ear, initial encounter        EMERGENCY DEPARTMENT COURSE   4:43 PM I met with the patient to gather history and to perform my initial exam.  We discussed treatment options and the plan for care while in the Emergency Department.  Triage vital signs:BP (!) 172/89   Pulse 78   Temp 97.8  F (36.6  C) (Oral)   Resp 18   SpO2 (!) 88%    Differential diagnosis considered included but not limited to: Traumatic process including subdural or ICH, cervical spine fracture, pulmonary process including pneumonia, pneumothorax, pulmonary embolism    MDM: Patient on exam was alert.  HEENT with superficial laceration to the helix left ear.  Cervical spine without apparent tenderness.  Cardiopulmonary exam normal.  Chest wall appear to be atraumatic without tenderness crepitus or ecchymosis.  Abdomen soft nontender without abdominal wall ecchymosis.  All extremities, thoracic and lumbar spine and pelvis are nontender to palpation.  Patient moving all extremities without apparent focal deficit  Diagnostic studies:  CT of the head obtained interpreted by myself: No traumatic process including evidence of skull fracture, subdural or ICH  CT cervical spine: No fracture  CTA of the chest: No pulmonary embolism, infiltrate, pneumothorax, acute rib fracture.  No pericardial effusion.   Atelectasis at the lung bases.  Laboratory studies obtained interpreted by myself:  EKG: Sinus rhythm with nonspecific ST-T wave changes initial troponin 23 on repeat 22  Glucose 256 with normal CO2 and electrolytes, comprehensive metabolic profile otherwise within normal limits, CBC within normal limits, COVID influenza PCR negative   Patient noted to have borderline positional hypoxia when lying flat RA O2 sats 80% but increases with positional change to 95%.  CTA of the chest with atelectasis of the lung bases without discrete infiltrate, pulmonary embolism or acute process.  Hypoxemia probably secondary to atelectasis  EKG without ischemic changes, troponin nonelevated, screening laboratory without significant abnormality.  Laceration was appropriately repaired as per procedure note.  Patient will be discharged back [facility] care unit for continued care.  If any concerning symptoms develop including fever, respiratory distress, signs of infection from the sutured wound will be return to the emergency department.    Discharge Vital Signs:BP (!) 194/129   Pulse 101   Temp 97.8  F (36.6  C) (Oral)   Resp 20   SpO2 94%    PROCEDURES:     PROCEDURE: Laceration Repair   INDICATIONS: Laceration   PROCEDURE PROVIDER: Dr Lencho Knight   SITE: Left Ear   TYPE/SIZE: simple, clean, and no foreign body visualized  2 cm (total length)   FUNCTIONAL ASSESSMENT: Distal sensation and circulation intact   MEDICATION: Topcal LET   PREPARATION: scrubbing with Normal saline   DEBRIDEMENT: no debridement   CLOSURE:  Superficial layer closed with 3 stitches of 5-0 Vycril Rapide simple interrupted    Total number of sutures/staples placed: 3        Diagnostic studies:  CT Chest Pulmonary Embolism w Contrast   Final Result   IMPRESSION:   1.  No evidence of pulmonary embolus.      CT Cervical Spine w/o Contrast   Final Result   IMPRESSION:   1.  No definite fracture or posttraumatic subluxation.   2.  Degenerative changes, as  above.   3.  Heterogeneous density left thyroid lobe nodule measuring up to 1.6 cm. Recommend nonemergent thyroid function testing and thyroid ultrasound for further assessment.         CT Head w/o Contrast   Final Result   IMPRESSION:   1.  No CT evidence for acute intracranial process.   2.  Brain atrophy and presumed chronic microvascular ischemic changes as above.        Labs Ordered and Resulted from Time of ED Arrival to Time of ED Departure   BASIC METABOLIC PANEL - Abnormal       Result Value    Sodium 145      Potassium 3.9      Chloride 104      Carbon Dioxide (CO2) 34 (*)     Anion Gap 7      Urea Nitrogen 15.8      Creatinine 0.87      GFR Estimate >90      Calcium 9.1      Glucose 256 (*)    HEPATIC FUNCTION PANEL - Abnormal    Protein Total 6.1 (*)     Albumin 3.6      Bilirubin Total 0.2      Alkaline Phosphatase 116      AST 20      ALT 30      Bilirubin Direct <0.08     TROPONIN T, HIGH SENSITIVITY - Abnormal    Troponin T, High Sensitivity 23 (*)    BLOOD GAS VENOUS - Abnormal    pH Venous 7.43      pCO2 Venous 52 (*)     pO2 Venous 49 (*)     Bicarbonate Venous 34 (*)     Base Excess/Deficit Venous 7.8 (*)     FIO2 21      Oxyhemoglobin Venous 83 (*)     O2 Sat, Venous 83.5 (*)    LACTIC ACID WHOLE BLOOD WITH 1X REPEAT IN 2 HR WHEN >2 - Normal    Lactic Acid, Initial 1.4     TROPONIN T, HIGH SENSITIVITY - Normal    Troponin T, High Sensitivity 22     INFLUENZA A/B, RSV AND SARS-COV2 PCR - Normal    Influenza A PCR Negative      Influenza B PCR Negative      RSV PCR Negative      SARS CoV2 PCR Negative     CBC WITH PLATELETS AND DIFFERENTIAL    WBC Count 7.9      RBC Count 5.23      Hemoglobin 15.4      Hematocrit 45.8      MCV 88      MCH 29.4      MCHC 33.6      RDW 12.5      Platelet Count 272      % Neutrophils 72      % Lymphocytes 17      % Monocytes 8      % Eosinophils 2      % Basophils 1      % Immature Granulocytes 0      NRBCs per 100 WBC 0      Absolute Neutrophils 5.7      Absolute  Lymphocytes 1.3      Absolute Monocytes 0.6      Absolute Eosinophils 0.2      Absolute Basophils 0.1      Absolute Immature Granulocytes 0.0      Absolute NRBCs 0.0       ED INTERVENTIONS     Medications   iopamidol (ISOVUE-370) solution 80 mL (80 mLs Intravenous $Given 5/23/25 1932)   lido-EPINEPHrine-tetracaine (LET) topical gel GEL (3 mLs Topical $Given 5/23/25 2146)     TOTAL CRITICAL CARE TIME (EXCLUDING PROCEDURES): Not applicable      DISCHARGE MEDICATIONS        Review of your medicines        UNREVIEWED medicines. Ask your doctor about these medicines        Dose / Directions   acetaminophen 325 MG tablet  Commonly known as: TYLENOL      Dose: 650 mg  Take 650 mg by mouth every 4 hours as needed for mild pain.  Refills: 0     * aspirin 81 MG EC tablet  Commonly known as: ASA  Indication: Stroke Due To Limited Blood Flow  Used for: Ischemic stroke (H)      Dose: 81 mg  Take 1 tablet (81 mg) by mouth daily. For 90 days. Once 90 days completed, switch to higher dose aspirin (separate Rx)  Quantity: 90 tablet  Refills: 0     * aspirin 325 MG EC tablet  Commonly known as: ASA  Indication: Stroke Due To Limited Blood Flow  Used for: Ischemic stroke (H)      Dose: 325 mg  Start taking on: July 23, 2025  Take 1 tablet (325 mg) by mouth daily. Start when 90 day course of clopidogrel completed.  Quantity: 90 tablet  Refills: 0     buPROPion 150 MG 24 hr tablet  Commonly known as: WELLBUTRIN XL  Used for: Severe episode of recurrent major depressive disorder, without psychotic features (H)      Dose: 150 mg  Take 1 tablet (150 mg) by mouth every morning.  Quantity: 90 tablet  Refills: 1     clopidogrel 75 MG tablet  Commonly known as: PLAVIX  Indication: Stroke Due To Limited Blood Flow  Used for: Ischemic stroke (H)      Dose: 75 mg  Take 1 tablet (75 mg) by mouth daily. For 90 days then stop.  Quantity: 90 tablet  Refills: 0     CLOVGRAN Tabs      Dose: 1 tablet  [MULTIVITAMIN THERAPEUTIC TABLET] Take 1 tablet by  mouth daily.  Refills: 0     escitalopram 20 MG tablet  Commonly known as: LEXAPRO  Used for: Anxiety      Dose: 20 mg  Take 1 tablet (20 mg) by mouth daily.  Quantity: 90 tablet  Refills: 1     guaiFENesin 20 mg/mL liquid  Commonly known as: ROBITUSSIN      Dose: 200 mg  Take 10 mLs (200 mg) by mouth every 4 hours as needed for cough.  Refills: 0     lisinopril 20 MG tablet  Commonly known as: ZESTRIL  Used for: Essential hypertension      Dose: 20 mg  Take 1 tablet (20 mg) by mouth 2 times daily.  Refills: 0     metFORMIN 500 MG 24 hr tablet  Commonly known as: GLUCOPHAGE XR  Used for: Type 2 diabetes mellitus without complication, without long-term current use of insulin (H)      Dose: 500 mg  Take 1 tablet (500 mg) by mouth daily (with dinner).  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  Refills: 0     rosuvastatin 10 MG tablet  Commonly known as: CRESTOR  Used for: Hyperlipidemia LDL goal <100      Dose: 10 mg  TAKE 1 TABLET BY MOUTH EVERYDAY AT BEDTIME  Quantity: 90 tablet  Refills: 3     SENNA-docusate sodium 8.6-50 MG tablet  Commonly known as: SENNA S      Dose: 2 tablet  Take 2 tablets by mouth at bedtime.  Refills: 0     Vitamin D3 25 mcg (1000 units) tablet  Commonly known as: CHOLECALCIFEROL      Dose: 1,000 Units  [CHOLECALCIFEROL, VITAMIN D3, 1,000 UNIT (25 MCG) TABLET] Take 1,000 Units by mouth daily.  Refills: 0           * This list has 2 medication(s) that are the same as other medications prescribed for you. Read the directions carefully, and ask your doctor or other care provider to review them with you.                CONTINUE these medicines which have NOT CHANGED        Dose / Directions   melatonin 3 MG tablet  Used for: Moderate late onset Alzheimer's dementia  with other behavioral disturbance (H)      Dose: 3 mg  Take 1 tablet (3 mg) by mouth at bedtime.  Refills: 0            DISPOSITION: Transfer back to memory care unit    At the conclusion of the encounter I discussed the results of all of the tests and the disposition. The questions were answered. The patient or family acknowledged understanding and was agreeable with the care plan.        HISTORY OF PRESENT ILLNESS   Iraj De Leon is a 74 year old year old male with a relevant past history of dementia, who presents to the ED  for evaluation after a fall.  Patient was noted on routine 2-hour checks to be on the floor.  No fall.  Patient had bleeding from his left ear.  Patient himself is without complaint including headache, neck pain, chest pain, shortness of breath abdominal pain.  No symptoms of systemic illness reported including fever, vomiting, diarrhea.  No other review of systems is available secondary to patient's dementia and EMS cannot provide further review of systems about recent illness.  In discussion with his niece Carol she reports no recent illnesses except for exposure to COVID-19 at prior memory care unit with recent transfer.  Patient has history of chronic cough and is listed as having possible.  No choking episodes reported      INFORMATION SOURCE AND LIMITATIONS    History/Exam limitations: Dementia  Patient information was obtained from: Patient and EMS,niece  Use of : N/A        REVIEW OF SYSTEMS:   All other systems reviewed and are negative except as noted above in HPI.    PATIENT HISTORY     Past Medical History:   Diagnosis Date    Arthritis     Cancer (H) 02/2020    Diabetes mellitus (H)     Headache 2014    Hyperlipidemia     Hypertension     Kidney stones 2019    Multiple sclerosis (H)     Osteoarthritis     Pituitary microadenoma (H)     Posterior vitreous detachment      Patient Active Problem List   Diagnosis    Degenerative joint disease (DJD) of hip    Essential  hypertension    Type 2 diabetes mellitus without complication, without long-term current use of insulin (H)    Screening for AAA (abdominal aortic aneurysm)    Acute right-sided low back pain without sciatica    Calcium oxalate monohydrate kidney stones    Chronic left shoulder pain    CSF leak    Diuretic-induced hypokalemia    Encephalomalacia on imaging study    Family history of stomach cancer    History of actinic keratoses    History of tobacco use    Hyperoxaluria    Mixed hyperlipidemia    Multiple sclerosis (H)    Need for hepatitis C screening test    Onychomycosis    Other seborrheic keratosis    Sleep disturbance    Snoring    Impaired cognition    Paresis of lower extremity (H)    Alzheimer's disease (H)    Anxiety    MDD (major depressive disorder), recurrent severe, without psychosis (H)    CHARLIE (generalized anxiety disorder)    Type 2 diabetes mellitus with right eye affected by mild nonproliferative retinopathy without macular edema, without long-term current use of insulin (H)    Hypoxia    Closed nondisplaced fracture of acromial end of left clavicle, initial encounter    Adjustment disorder with mixed anxiety and depressed mood    Tinnitus, bilateral    Sensorineural hearing loss (SNHL) of both ears    Norovirus    Mild neurocognitive disorder    Microscopic hematuria    Henri hematuria    History of total hip replacement    History of basal cell carcinoma    Closed fracture of distal phalanx of right great toe    Calcium disorder    Senile dementia (H)    Kidney stone    Altered mental status, unspecified altered mental status type    Left frontal subcortical infarction    COVID-19    Acute hypoxic on chronic hypercapnic respiratory failure (H)     Past Surgical History:   Procedure Laterality Date    BASAL CELL CARCINOMA EXCISION  02/20/2020    Moh's procedure to forehead    CHOLECYSTECTOMY      COLONOSCOPY      EYE SURGERY Right     cataract    IR LUMBAR PUNCTURE  3/28/2014    JOINT REPLACEMENT   10/2019    RTHA    LITHOTRIPSY  2019    OTHER SURGICAL HISTORY  2009    lipoma removalforehead    OTHER SURGICAL HISTORY      spinal fluid leak    RI CYSTO/URETERO W/LITHOTRIPSY &INDWELL STENT INSRT Right 3/27/2019    Procedure: CYSTOSCOPY RIGHT RETROGRADE PYELOGRAM, RIGHT URETEROSCOPY WITH LASER  LITHOTRIPSY STONE EXTRACTION AND RIGHT STENT EXCHANGE;  Surgeon: Zia Coyle MD;  Location: [facility] Main OR;  Service: Urology    [facility] TOTAL HIP ARTHROPLASTY Right 10/21/2019    Procedure: RIGHT TOTAL HIP ARTHROPLASTY;  Surgeon: Conrado Gomez MD;  Location: [facility] OR;  Service: Orthopedics    [facility] TOTAL HIP ARTHROPLASTY Left 3/16/2020    Procedure: LEFT TOTAL HIP ARTHROPLASTY;  Surgeon: Conrado Gomez MD;  Location: [facility] OR;  Service: Orthopedics       No Known Allergies    OUTPATIENT MEDICATIONS     Discharge Medication List as of 5/23/2025 10:42 PM         Vitals:    05/23/25 2105 05/23/25 2120 05/23/25 2151 05/23/25 2247   BP: (!) 196/104 (!) 176/95 (!) 195/134 (!) 194/129   Pulse: 80 88 101    Resp: 22 20     Temp:       TempSrc:       SpO2: 99% 95% 94%        Physical Exam   Constitutional: Oriented to person Appears well-developed and well-nourished.   HEENT:   2 cm laceration of the helix left ear.  Cervical spine without apparent tenderness head otherwise atraumatic  Cardiovascular: Normal rate, regular rhythm and normal heart sounds.    Pulmonary/Chest: Normal effort  and breath sounds normal.   Abdominal: Soft. Bowel sounds are normal.   Musculoskeletal: Normal range of motion.  Able to range hip without apparent pain.  No tenderness of the hips pelvis, axial skeleton, cervical, thoracic or lumbar spine  Neurological: Alert and oriented to person, follows commands and was responds appropriately normal strength. No sensory deficit. No cranial nerve deficit.  Skin: Skin is warm and dry.       DIAGNOSTICS    LABORATORY FINDINGS (REVIEWED AND INTERPRETED):  Labs Ordered and  Resulted from Time of ED Arrival to Time of ED Departure   BASIC METABOLIC PANEL - Abnormal       Result Value    Sodium 145      Potassium 3.9      Chloride 104      Carbon Dioxide (CO2) 34 (*)     Anion Gap 7      Urea Nitrogen 15.8      Creatinine 0.87      GFR Estimate >90      Calcium 9.1      Glucose 256 (*)    HEPATIC FUNCTION PANEL - Abnormal    Protein Total 6.1 (*)     Albumin 3.6      Bilirubin Total 0.2      Alkaline Phosphatase 116      AST 20      ALT 30      Bilirubin Direct <0.08     TROPONIN T, HIGH SENSITIVITY - Abnormal    Troponin T, High Sensitivity 23 (*)    BLOOD GAS VENOUS - Abnormal    pH Venous 7.43      pCO2 Venous 52 (*)     pO2 Venous 49 (*)     Bicarbonate Venous 34 (*)     Base Excess/Deficit Venous 7.8 (*)     FIO2 21      Oxyhemoglobin Venous 83 (*)     O2 Sat, Venous 83.5 (*)    LACTIC ACID WHOLE BLOOD WITH 1X REPEAT IN 2 HR WHEN >2 - Normal    Lactic Acid, Initial 1.4     TROPONIN T, HIGH SENSITIVITY - Normal    Troponin T, High Sensitivity 22     INFLUENZA A/B, RSV AND SARS-COV2 PCR - Normal    Influenza A PCR Negative      Influenza B PCR Negative      RSV PCR Negative      SARS CoV2 PCR Negative     CBC WITH PLATELETS AND DIFFERENTIAL    WBC Count 7.9      RBC Count 5.23      Hemoglobin 15.4      Hematocrit 45.8      MCV 88      MCH 29.4      MCHC 33.6      RDW 12.5      Platelet Count 272      % Neutrophils 72      % Lymphocytes 17      % Monocytes 8      % Eosinophils 2      % Basophils 1      % Immature Granulocytes 0      NRBCs per 100 WBC 0      Absolute Neutrophils 5.7      Absolute Lymphocytes 1.3      Absolute Monocytes 0.6      Absolute Eosinophils 0.2      Absolute Basophils 0.1      Absolute Immature Granulocytes 0.0      Absolute NRBCs 0.0           IMAGING (REVIEWED AND INTERPRETED):  CT Chest Pulmonary Embolism w Contrast   Final Result   IMPRESSION:   1.  No evidence of pulmonary embolus.      CT Cervical Spine w/o Contrast   Final Result   IMPRESSION:   1.  No  definite fracture or posttraumatic subluxation.   2.  Degenerative changes, as above.   3.  Heterogeneous density left thyroid lobe nodule measuring up to 1.6 cm. Recommend nonemergent thyroid function testing and thyroid ultrasound for further assessment.         CT Head w/o Contrast   Final Result   IMPRESSION:   1.  No CT evidence for acute intracranial process.   2.  Brain atrophy and presumed chronic microvascular ischemic changes as above.            ECG (REVIEWED AND INTERPRETED):   ECG:   Performed at: 16:45  HR:  77 bpm  Rhythm: SInus  Axis: -24  QRS duration: 102 ms  QTC: 488 ms  ST changes: No ST segment elevation or depression, no T wave inversion,No Q wave  Interpretation: Sinus rhythm with nonspecific ST-T wave changes  Compared to most recent ECG from: April 22, 2025 no acute change    I have reviewed the patient's ECG, with comments made as listed above. Please see scanned image for full interpretation.     Billing Documentation    I obtained additional history from these independent historians:  Patient's niece and EMS  I reviewed these outside records:  Primary care televisit where the patient was seen for management of dementia on April 28, 2025  I noted these abnormal vital signs / labs:  /129 asymptomatic, SPO2 RA: Lying supine 88%    Monitor Strip Interpretation:  Sinus rhythm  12-Lead ECG Interpretation:  Sinus rhythm  I independently reviewed the following diagnostic studies:  CT of the head  I spoke to the following clinicians regard the patients care:  N/A  My disposition decision is based on the following reasons:    Discharge: I considered admission but discharged the patient after current workup and patient's clinical course in the emergency department.  Prescription medications prescribed: None    Compliance Documentation  MIPS Documentation Medical Decision Making  I obtained history from Family Member/Significant Other and EMS  I reviewed the EMR: Outpatient Record: See  above  Care impacted by Dementia  I independently interpreted the CT of the head and note no acute process. See radiology report for final interpretation.  Discharge. No recommendations on prescription strength medication(s). I considered admission, but ultimately discharged patient current workup and patient's course in the emergency department.    MIPS (CTPE, Dental pain, [place], Sinusitis, Asthma/COPD, Head Trauma): Adult Minor Head Trauma:Age 65 years or older and Currently taking anticoagulant medications: warfarin or other novel anticoagulant medications    SEPSIS: None        At the time of my evaluation, I do not feel the patient s symptoms are caused by sepsis          Lencho Knight D.O.  EMERGENCY MEDICINE   05/23/25  [facility] EMERGENCY DEPARTMENT  [address] [place] [address] [id number] [phone]  Dept: [phone]        Lencho Knight DO  05/24/25 0159

## 2025-05-23 NOTE — ED NOTES
Bed: JNED-04  Expected date: 5/23/25  Expected time: 4:19 PM  Means of arrival: Ambulance  Comments:  [facility] - 74M- fall , memory care, unwitnessed, unknown downtime, on thinners, unknown loc, [address]

## 2025-05-24 NOTE — DISCHARGE INSTRUCTIONS
Cleanse the left ear daily with warm soapy water apply bacitracin.  If there are signs of infection redness swelling or drainage have the patient return to the emergency department.  Dissolvable sutures were placed so these should dissolve eventually can be removed in 7 days if they are bothersome to the patient.

## 2025-05-24 NOTE — ED NOTES
I called the [facility] facility to inform them that the patient is going back rory c/o Mikala Franco.

## 2025-05-25 LAB
ATRIAL RATE - MUSE: 77 BPM
DIASTOLIC BLOOD PRESSURE - MUSE: 89 MMHG
INTERPRETATION ECG - MUSE: NORMAL
P AXIS - MUSE: 41 DEGREES
PR INTERVAL - MUSE: 176 MS
QRS DURATION - MUSE: 102 MS
QT - MUSE: 432 MS
QTC - MUSE: 488 MS
R AXIS - MUSE: -24 DEGREES
SYSTOLIC BLOOD PRESSURE - MUSE: 172 MMHG
T AXIS - MUSE: -5 DEGREES
VENTRICULAR RATE- MUSE: 77 BPM

## 2025-06-06 ENCOUNTER — TELEPHONE (OUTPATIENT)
Dept: FAMILY MEDICINE | Facility: CLINIC | Age: 75
End: 2025-06-06
Payer: MEDICARE

## 2025-06-06 NOTE — TELEPHONE ENCOUNTER
Received call from Gabriela from [facility].  FYI to PCP. PT eval to be moved to next week per POatient request.  Call back number [phone].  Martin HANNON, Clinic RN   [facility]